# Patient Record
Sex: MALE | Race: WHITE | Employment: FULL TIME | URBAN - METROPOLITAN AREA
[De-identification: names, ages, dates, MRNs, and addresses within clinical notes are randomized per-mention and may not be internally consistent; named-entity substitution may affect disease eponyms.]

---

## 2017-12-09 PROBLEM — F33.1 MODERATE EPISODE OF RECURRENT MAJOR DEPRESSIVE DISORDER (HCC): Status: RESOLVED | Noted: 2017-12-09 | Resolved: 2017-12-09

## 2017-12-09 PROBLEM — F33.1 MODERATE EPISODE OF RECURRENT MAJOR DEPRESSIVE DISORDER (HCC): Status: ACTIVE | Noted: 2017-12-09

## 2018-06-26 ENCOUNTER — HOSPITAL ENCOUNTER (OUTPATIENT)
Dept: MRI IMAGING | Age: 48
Discharge: HOME | End: 2018-06-26
Attending: NURSE PRACTITIONER
Payer: COMMERCIAL

## 2018-06-26 DIAGNOSIS — M54.42 CHRONIC BILATERAL LOW BACK PAIN WITH BILATERAL SCIATICA: ICD-10-CM

## 2018-06-26 DIAGNOSIS — G89.29 CHRONIC BILATERAL LOW BACK PAIN WITH BILATERAL SCIATICA: ICD-10-CM

## 2018-06-26 DIAGNOSIS — M54.41 CHRONIC BILATERAL LOW BACK PAIN WITH BILATERAL SCIATICA: ICD-10-CM

## 2018-06-26 PROCEDURE — 72148 MRI LUMBAR SPINE W/O DYE: CPT

## 2018-06-29 NOTE — PROGRESS NOTES
Central disc protrusion more pronounced than previously L4-5 Can refer to neurosurgeon or ortho for evaluation.  Naty Portal, FNP

## 2018-08-09 PROBLEM — M54.50 LOW BACK PAIN: Status: ACTIVE | Noted: 2018-08-09

## 2018-11-28 ENCOUNTER — HOSPITAL ENCOUNTER (OUTPATIENT)
Dept: PHYSICAL THERAPY | Age: 48
Discharge: HOME | End: 2018-11-28
Payer: COMMERCIAL

## 2018-11-28 PROCEDURE — 97162 PT EVAL MOD COMPLEX 30 MIN: CPT

## 2018-11-28 PROCEDURE — 97110 THERAPEUTIC EXERCISES: CPT

## 2018-11-28 NOTE — THERAPY EVALUATION
Lianne Coleman  : 1970  Primary: [facility] [employer]*  Secondary:  [address]  [facility] [address] [facility] [address], [address] [facility]  [phone]   [phone]          OUTPATIENT PHYSICAL THERAPY:Initial Assessment 2018   ICD-10: Treatment Diagnosis: Low back pain (M54.5)  Precautions/Allergies:   Patient has no known allergies. MD Orders: Evaluate and Treat MEDICAL/REFERRING DIAGNOSIS:  Low back pain [M54.5]   DATE OF ONSET: Chronic  REFERRING PHYSICIAN: Harry Courtney NP  RETURN PHYSICIAN APPOINTMENT: TBD     INITIAL ASSESSMENT:  Mr. Pat Galindo presents with decreased mobility, decreased strength, and pain in lower back and core region secondary to degenerative changes. After discussing with patient, he agreed he would benefit from physical therapy to improve above deficits. Please sign this plan of treatment if you concur. Thank you for the opportunity to serve this patient. PROBLEM LIST (Impacting functional limitations):  1. Decreased Strength  2. Decreased ADL/Functional Activities  3. Increased Pain  4. Decreased Activity Tolerance INTERVENTIONS PLANNED:  1. Cold  2. Electrical Stimulation  3. Heat  4. Home Exercise Program (HEP)  5. Manual Therapy  6. Range of Motion (ROM)  7. Therapeutic Activites  8. Therapeutic Exercise/Strengthening   TREATMENT PLAN:  Effective Dates: 2018 TO 2019 (90 days). Frequency/Duration: 2 times a week for 90 Day(s)  GOALS: (Goals have been discussed and agreed upon with patient.)  Short-Term Functional Goals: Time Frame: 30 days  1. Patient will be independent with home exercise program without exacerbation of symptoms or cueing needed. 2. Patient will be independent with correct sleeping positions and awareness/avoidance of aggravating positions without cueing needed. Discharge Goals: Time Frame: 90 days  1.  Patient will be independent with all ADLs with minimal onset of back pain and no deficits with daily tasks. 2. Patient will report no fear avoidance with social or recreational activities due to back pain. 3. Patient will score less than or equal to 26/50 on Modified Oswestry Lower Back Pain Questionnaire with minimal effect of back pain on patient's ability to manage every day life activities. Rehabilitation Potential For Stated Goals[facility] I certify that the treatment plan above will be carried out by a therapist or under their direction. Thank you for this referral,  Koko Scott PT     Referring Physician Signature: Susan Hatfield NP              Date                    The information in this section was collected on 11/28/2018 (except where otherwise noted). HISTORY:   History of Present Injury/Illness (Reason for Referral):  Patient reports he had a laminectomy and microdiscectomy in 2003. He reports it helped initially, but then began to have pain in his back that keeps him from working and doing normal daily tasks. He reports that his back will \"give out\" and he will have trouble walking for about a week to two weeks. He reports that the pain will radiate into his hips and groin and down his legs. He reports that he has to be in the bed and lying on his side in order to relieve his pain. He reports when he is feeling good, walking seems to help the most.  He reports that he never knows when the pain will happen, but it seems to happen most when he reaches forward. He reports he thinks surgery is in his future, but he doesn't know where to go yet. He reports he knows he needs to work on his core strength and mobility.   Past Medical History/Comorbidities:   Mr. Zhang Fernández  has a past medical history of Androgen deficiency, Anxiety, Back pain, Chronic pain, Dermatophytosis of nail, Discitis of lumbar region, Fracture of metatarsal of left foot, closed, Hyperlipidemia, Hypertension, Lyme disease, Other malaise and fatigue, and Psychiatric disorder. Mr. Michelle Condon  has a past surgical history that includes hx orthopaedic. Social History/Living Environment:   Home Environment: Private residence  Living Alone: No  Support Systems: Family member(s), Friends \ neighbors  Prior Level of Function/Work/Activity:  Independent  Dominant Side:         RIGHT  Personal Factors:          Sex:  male        Age:  51 y.o. Ambulatory/Rehab Services H2 Model Falls Risk Assessment    Risk Factors:       (1)  Gender [Male] Ability to Rise from Chair:       (1)  Pushes up, successful in one attempt    Falls Prevention Plan:       No modifications necessary   Total: (5 or greater = High Risk): 2    ©2010 [employer]. All Rights Reserved. [facility] Patent #[id number]. Federal Law prohibits the replication, distribution or use without written permission from [employer]     Current Medications:       Current Outpatient Medications:     clonazePAM (KLONOPIN) 1 mg tablet, Take 1 Tab by mouth two (2) times daily as needed. Max Daily Amount: 2 mg., Disp: 60 Tab, Rfl: 2    buPROPion XL (WELLBUTRIN XL) 150 mg tablet, Take 1 Tab by mouth every morning., Disp: 30 Tab, Rfl: 2    HYDROcodone-acetaminophen (NORCO)  mg tablet, 1/2 to 1 tablet every 6 hours as needed, Disp: 60 Tab, Rfl: 0    gabapentin (NEURONTIN) 300 mg capsule, Take 600 mg by mouth three (3) times daily. , Disp: , Rfl:     methocarbamol (ROBAXIN-750) 750 mg tablet, Take  by mouth four (4) times daily. , Disp: , Rfl:     sertraline (ZOLOFT) 100 mg tablet, Take 1.5 Tabs by mouth daily. Indications: ANXIETY WITH DEPRESSION, Disp: 45 Tab, Rfl: 5    lisinopril (PRINIVIL, ZESTRIL) 40 mg tablet, Take 1 Tab by mouth daily. Indications: hypertension, Disp: 30 Tab, Rfl: 5    atorvastatin (LIPITOR) 40 mg tablet, Take 1 Tab by mouth daily. Indications: mixed hyperlipidemia, Disp: 30 Tab, Rfl: 5    fenofibric acid (TRILIPIX ER) 135 mg capsule, Take 1 Cap by mouth nightly. Indications: mixed hyperlipidemia, Disp: 30 Cap, Rfl: 5    testosterone cypionate (DEPOTESTOTERONE CYPIONATE) 200 mg/mL injection, 1 mL by IntraMUSCular route Once every 2 weeks. Max Daily Amount: 200 mg. Indications: ANDROGEN DEFICIENCY, Disp: 2 mL, Rfl: 5    naproxen (NAPROSYN) 500 mg tablet, Take 1 Tab by mouth two (2) times daily (with meals). Indications: Pain, Disp: 60 Tab, Rfl: 5    raNITIdine (ZANTAC) 75 mg tablet, Take 75 mg by mouth two (2) times daily as needed for Indigestion. , Disp: , Rfl:     OMEGA-3 FATTY ACIDS/FISH OIL (OMEGA 3 FISH OIL PO), Take  by mouth., Disp: , Rfl:     multivitamin (ONE A DAY) tablet, Take 1 Tab by mouth daily. , Disp: , Rfl:     nitroglycerin (NITROSTAT) 0.4 mg SL tablet, 1 Tab by SubLINGual route every five (5) minutes as needed for Chest Pain., Disp: 1 Bottle, Rfl: 3   Date Last Reviewed:  2018    Number of Personal Factors/Comorbidities that affect the Plan of Care: 1-2: MODERATE COMPLEXITY   EXAMINATION:   Observation/Orthostatic Postural Assessment:          Posture Assessment: Forward head, Trunk flexion, decreased lumbar lordosis and thoracic kyphosis in standing and sitting   Palpation:          Tightness and tenderness to palpation noted along bilateral lumbar paraspinals and sacroiliac region. No bruising or swelling noted. ROM:          Lumbar Assessment (AROM):  · Flexion: 50% of full AROM  · Extension: 50% of full AROM  · Right side bendin% of full AROM  · Left side bending[de-identified] 50% of full AROM  · Right rotation: 50% of full AROM  · Left rotation: 50% of full AROM  Strength:          Lumbar Assessment (Strength):  · Core strength: Grossly 3/5 with manual muscle testing  · Multifidus strength: Grossly 3/5 with manual muscle testing  Special Tests:          Negative sit/slump test.  Positive straight leg raise for reproduction of symptoms as well as hamstring tightness. Neurological Screen:        Dermatomes:   Within normal limits        Reflexes: 2+  Functional Mobility:         Gait/Mobility:    Base of Support: Center of gravity altered  Speed/Lucinda: Pace decreased (<100 feet/min)  Step Length: Left shortened, Right shortened  Swing Pattern: Left asymmetrical, Right asymmetrical  Stance: Left increased, Right increased  Gait Abnormalities: Antalgic  Ambulation - Level of Assistance: Independent  · Interventions: Verbal cues, Safety awareness training          Transfers:     Sit to Stand: Independent  Stand to Sit: Independent  Stand Pivot Transfers: Independent  Bed to Chair: Independent  Lateral Transfers: Independent         Bed Mobility:     Rolling: Independent  Supine to Sit: Independent  Sit to Supine: Independent  Scooting: Independent      Body Structures Involved:  1. Nerves  2. Joints  3. Muscles Body Functions Affected:  1. Neuromusculoskeletal  2. Movement Related Activities and Participation Affected:  1. Mobility  2. Self Care   Number of elements (examined above) that affect the Plan of Care: 4+: HIGH COMPLEXITY   CLINICAL PRESENTATION:   Presentation: Evolving clinical presentation with changing clinical characteristics: MODERATE COMPLEXITY   CLINICAL DECISION MAKING:   Outcome Measure: Tool Used: Modified Oswestry Low Back Pain Questionnaire  Score:  Initial: 36/50  Most Recent: X/50 (Date: -- )   Interpretation of Score: Each section is scored on a 0-5 scale, 5 representing the greatest disability. The scores of each section are added together for a total score of 50. Score 0 1-10 11-20 21-30 31-40 41-49 50   Modifier CH CI CJ CK CL CM CN     Medical Necessity:   · Patient is expected to demonstrate progress in strength and range of motion to improve safety during daily activities. · Patient demonstrates good rehab potential due to higher previous functional level. · Skilled intervention continues to be required due to decreased mobility.   Reason for Services/Other Comments:  · Patient continues to demonstrate capacity to improve overall functional mobility which will increase independence and increase safety. Use of outcome tool(s) and clinical judgement create a POC that gives a: Questionable prediction of patient's progress: MODERATE COMPLEXITY            TREATMENT:   (In addition to Assessment/Re-Assessment sessions the following treatments were rendered)  Pre-treatment Symptoms/Complaints:  11/28/2018: Patient reports he is about his normal today with everything hurting across his lower back   Pain: Initial:   Pain Intensity 1: 7  Pain Location 1: Back  Pain Orientation 1: Lower  Pain Intervention(s) 1: Rest, Medication (see MAR)  Post Session:  6/10     THERAPEUTIC EXERCISE: (15 minutes):  Exercises per grid below to improve mobility and strength. Required minimal verbal and manual cues to promote proper body alignment, promote proper body posture and promote proper body mechanics. Progressed resistance, range, repetitions and complexity of movement as indicated. Date:  11/28/2018   Activity/Exercise Parameters   Active hamstring stretch with ankle pumps and single knee to chest stretch HEP   Isometric hip flexion for core strengthening HEP      Med[employer] Portal  Treatment/Session Assessment:    · Response to Treatment:  Patient tolerated assessment with complaints of increased back pain. Patient verbalized and demonstrated understanding of HEP. · Compliance with Program/Exercises: Will assess as treatment progresses. · Recommendations/Intent for next treatment session: \"Next visit will focus on advancements to more challenging activities\".   Total Treatment Duration:  PT Patient Time In/Time Out  Time In: 1430  Time Out: 300 [employer] [place], PT    Future Appointments   Date Time Provider Laya Farrar   12/3/2018  9:45 AM [facility]   12/6/2018  9:45 AM LOIS Rivers [facility]   12/10/2018  9:45 AM LOIS Rivers [facility]   12/11/2018  9:00 AM BALTAZAR NURSE Helen LEDESMA [facility]   12/13/2018  9:45 AM Kaitlin Ada, PT [facility]   12/17/2018 10:30 AM Alla Setter, PTA [facility]   12/20/2018  9:45 AM Nury Hamilton, PTA [facility]   12/26/2018  8:00 AM ANA Bravo [facility]   12/27/2018  9:45 AM Kaitlin Ada, PT [facility]   12/31/2018  9:45 AM [place] Setter, PTA [facility]   1/3/2019  9:45 AM Nury Hamilton, PTA [facility]

## 2018-12-06 ENCOUNTER — HOSPITAL ENCOUNTER (OUTPATIENT)
Dept: PHYSICAL THERAPY | Age: 48
Discharge: HOME | End: 2018-12-06

## 2018-12-10 ENCOUNTER — APPOINTMENT (OUTPATIENT)
Dept: PHYSICAL THERAPY | Age: 48
End: 2018-12-10

## 2018-12-31 ENCOUNTER — APPOINTMENT (OUTPATIENT)
Dept: PHYSICAL THERAPY | Age: 48
End: 2018-12-31

## 2019-01-03 ENCOUNTER — APPOINTMENT (OUTPATIENT)
Dept: PHYSICAL THERAPY | Age: 49
End: 2019-01-03

## 2021-02-11 PROBLEM — I34.1 MITRAL VALVE PROLAPSE: Status: ACTIVE | Noted: 2021-02-11

## 2021-08-03 ENCOUNTER — HOSPITAL ENCOUNTER (OUTPATIENT)
Dept: MRI IMAGING | Age: 51
Discharge: HOME | End: 2021-08-03
Attending: UROLOGY
Payer: COMMERCIAL

## 2021-08-03 DIAGNOSIS — R97.20 ELEVATED PSA: ICD-10-CM

## 2021-08-03 PROCEDURE — 72197 MRI PELVIS W/O & W/DYE: CPT

## 2021-08-03 PROCEDURE — 74011636320 HC RX REV CODE- 636/320: Performed by: UROLOGY

## 2021-08-03 PROCEDURE — A9576 INJ PROHANCE MULTIPACK: HCPCS | Performed by: UROLOGY

## 2021-08-03 PROCEDURE — 74011000258 HC RX REV CODE- 258: Performed by: UROLOGY

## 2021-08-03 RX ORDER — SODIUM CHLORIDE 0.9 % (FLUSH) 0.9 %
10 SYRINGE (ML) INJECTION
Status: COMPLETED | OUTPATIENT
Start: 2021-08-03 | End: 2021-08-03

## 2021-08-03 RX ADMIN — GADOTERIDOL 19 ML: 279.3 INJECTION, SOLUTION INTRAVENOUS at 21:23

## 2021-08-03 RX ADMIN — SODIUM CHLORIDE 100 ML: 900 INJECTION, SOLUTION INTRAVENOUS at 21:23

## 2021-08-03 RX ADMIN — Medication 10 ML: at 21:23

## 2021-08-18 ENCOUNTER — ANESTHESIA EVENT (OUTPATIENT)
Dept: SURGERY | Age: 51
End: 2021-08-18
Payer: COMMERCIAL

## 2021-08-19 ENCOUNTER — ANESTHESIA (OUTPATIENT)
Dept: SURGERY | Age: 51
End: 2021-08-19
Payer: COMMERCIAL

## 2021-08-19 ENCOUNTER — HOSPITAL ENCOUNTER (OUTPATIENT)
Age: 51
Setting detail: OUTPATIENT SURGERY
Discharge: HOME | End: 2021-08-19
Attending: UROLOGY | Admitting: UROLOGY
Payer: COMMERCIAL

## 2021-08-19 VITALS
TEMPERATURE: 98 F | HEIGHT: 71 IN | WEIGHT: 201.2 LBS | RESPIRATION RATE: 12 BRPM | OXYGEN SATURATION: 97 % | SYSTOLIC BLOOD PRESSURE: 128 MMHG | DIASTOLIC BLOOD PRESSURE: 77 MMHG | HEART RATE: 67 BPM | BODY MASS INDEX: 28.17 KG/M2

## 2021-08-19 DIAGNOSIS — R97.20 ELEVATED PSA: ICD-10-CM

## 2021-08-19 PROCEDURE — 74011000250 HC RX REV CODE- 250: Performed by: NURSE ANESTHETIST, CERTIFIED REGISTERED

## 2021-08-19 PROCEDURE — 74011250637 HC RX REV CODE- 250/637: Performed by: ANESTHESIOLOGY

## 2021-08-19 PROCEDURE — 76060000031 HC ANESTHESIA FIRST 0.5 HR: Performed by: UROLOGY

## 2021-08-19 PROCEDURE — 76210000020 HC REC RM PH II FIRST 0.5 HR: Performed by: UROLOGY

## 2021-08-19 PROCEDURE — 88305 TISSUE EXAM BY PATHOLOGIST: CPT

## 2021-08-19 PROCEDURE — 76942 ECHO GUIDE FOR BIOPSY: CPT | Performed by: UROLOGY

## 2021-08-19 PROCEDURE — 55700 PR BIOPSY OF PROSTATE,NEEDLE/PUNCH: CPT | Performed by: UROLOGY

## 2021-08-19 PROCEDURE — 2709999900 HC NON-CHARGEABLE SUPPLY: Performed by: UROLOGY

## 2021-08-19 PROCEDURE — 76210000063 HC OR PH I REC FIRST 0.5 HR: Performed by: UROLOGY

## 2021-08-19 PROCEDURE — 74011250636 HC RX REV CODE- 250/636: Performed by: ANESTHESIOLOGY

## 2021-08-19 PROCEDURE — 74011250636 HC RX REV CODE- 250/636: Performed by: UROLOGY

## 2021-08-19 PROCEDURE — 74011250636 HC RX REV CODE- 250/636: Performed by: NURSE ANESTHETIST, CERTIFIED REGISTERED

## 2021-08-19 PROCEDURE — 76010000154 HC OR TIME FIRST 0.5 HR: Performed by: UROLOGY

## 2021-08-19 PROCEDURE — 74011000258 HC RX REV CODE- 258: Performed by: UROLOGY

## 2021-08-19 PROCEDURE — 77030003481 HC NDL BIOP GUN BARD -B: Performed by: UROLOGY

## 2021-08-19 RX ORDER — LIDOCAINE HYDROCHLORIDE 20 MG/ML
INJECTION, SOLUTION EPIDURAL; INFILTRATION; INTRACAUDAL; PERINEURAL AS NEEDED
Status: DISCONTINUED | OUTPATIENT
Start: 2021-08-19 | End: 2021-08-19 | Stop reason: HOSPADM

## 2021-08-19 RX ORDER — FAMOTIDINE 20 MG/1
20 TABLET, FILM COATED ORAL ONCE
Status: COMPLETED | OUTPATIENT
Start: 2021-08-19 | End: 2021-08-19

## 2021-08-19 RX ORDER — PROPOFOL 10 MG/ML
INJECTION, EMULSION INTRAVENOUS
Status: DISCONTINUED | OUTPATIENT
Start: 2021-08-19 | End: 2021-08-19 | Stop reason: HOSPADM

## 2021-08-19 RX ORDER — SODIUM CHLORIDE 0.9 % (FLUSH) 0.9 %
5-40 SYRINGE (ML) INJECTION EVERY 8 HOURS
Status: DISCONTINUED | OUTPATIENT
Start: 2021-08-19 | End: 2021-08-19 | Stop reason: HOSPADM

## 2021-08-19 RX ORDER — SODIUM CHLORIDE 0.9 % (FLUSH) 0.9 %
5-40 SYRINGE (ML) INJECTION AS NEEDED
Status: DISCONTINUED | OUTPATIENT
Start: 2021-08-19 | End: 2021-08-19 | Stop reason: HOSPADM

## 2021-08-19 RX ORDER — SODIUM CHLORIDE, SODIUM LACTATE, POTASSIUM CHLORIDE, CALCIUM CHLORIDE 600; 310; 30; 20 MG/100ML; MG/100ML; MG/100ML; MG/100ML
100 INJECTION, SOLUTION INTRAVENOUS CONTINUOUS
Status: DISCONTINUED | OUTPATIENT
Start: 2021-08-19 | End: 2021-08-19 | Stop reason: HOSPADM

## 2021-08-19 RX ORDER — HYDROMORPHONE HYDROCHLORIDE 2 MG/ML
0.5 INJECTION, SOLUTION INTRAMUSCULAR; INTRAVENOUS; SUBCUTANEOUS
Status: DISCONTINUED | OUTPATIENT
Start: 2021-08-19 | End: 2021-08-19 | Stop reason: HOSPADM

## 2021-08-19 RX ORDER — MIDAZOLAM HYDROCHLORIDE 1 MG/ML
INJECTION, SOLUTION INTRAMUSCULAR; INTRAVENOUS AS NEEDED
Status: DISCONTINUED | OUTPATIENT
Start: 2021-08-19 | End: 2021-08-19 | Stop reason: HOSPADM

## 2021-08-19 RX ORDER — PROPOFOL 10 MG/ML
INJECTION, EMULSION INTRAVENOUS AS NEEDED
Status: DISCONTINUED | OUTPATIENT
Start: 2021-08-19 | End: 2021-08-19 | Stop reason: HOSPADM

## 2021-08-19 RX ORDER — DIPHENHYDRAMINE HYDROCHLORIDE 50 MG/ML
12.5 INJECTION, SOLUTION INTRAMUSCULAR; INTRAVENOUS
Status: DISCONTINUED | OUTPATIENT
Start: 2021-08-19 | End: 2021-08-19 | Stop reason: HOSPADM

## 2021-08-19 RX ORDER — MIDAZOLAM HYDROCHLORIDE 1 MG/ML
2 INJECTION, SOLUTION INTRAMUSCULAR; INTRAVENOUS ONCE
Status: DISCONTINUED | OUTPATIENT
Start: 2021-08-19 | End: 2021-08-19 | Stop reason: HOSPADM

## 2021-08-19 RX ORDER — OXYCODONE AND ACETAMINOPHEN 5; 325 MG/1; MG/1
1 TABLET ORAL AS NEEDED
Status: DISCONTINUED | OUTPATIENT
Start: 2021-08-19 | End: 2021-08-19 | Stop reason: HOSPADM

## 2021-08-19 RX ORDER — SODIUM CHLORIDE, SODIUM LACTATE, POTASSIUM CHLORIDE, CALCIUM CHLORIDE 600; 310; 30; 20 MG/100ML; MG/100ML; MG/100ML; MG/100ML
75 INJECTION, SOLUTION INTRAVENOUS CONTINUOUS
Status: DISCONTINUED | OUTPATIENT
Start: 2021-08-19 | End: 2021-08-19 | Stop reason: HOSPADM

## 2021-08-19 RX ORDER — FENTANYL CITRATE 50 UG/ML
25 INJECTION, SOLUTION INTRAMUSCULAR; INTRAVENOUS ONCE
Status: DISCONTINUED | OUTPATIENT
Start: 2021-08-19 | End: 2021-08-19 | Stop reason: HOSPADM

## 2021-08-19 RX ORDER — SODIUM CHLORIDE 9 MG/ML
50 INJECTION, SOLUTION INTRAVENOUS CONTINUOUS
Status: DISCONTINUED | OUTPATIENT
Start: 2021-08-19 | End: 2021-08-19 | Stop reason: HOSPADM

## 2021-08-19 RX ORDER — ACETAMINOPHEN 500 MG
1000 TABLET ORAL
Status: COMPLETED | OUTPATIENT
Start: 2021-08-19 | End: 2021-08-19

## 2021-08-19 RX ORDER — OXYCODONE HYDROCHLORIDE 5 MG/1
5 TABLET ORAL
Status: COMPLETED | OUTPATIENT
Start: 2021-08-19 | End: 2021-08-19

## 2021-08-19 RX ORDER — LIDOCAINE HYDROCHLORIDE 10 MG/ML
0.1 INJECTION INFILTRATION; PERINEURAL AS NEEDED
Status: DISCONTINUED | OUTPATIENT
Start: 2021-08-19 | End: 2021-08-19 | Stop reason: HOSPADM

## 2021-08-19 RX ORDER — MIDAZOLAM HYDROCHLORIDE 1 MG/ML
2 INJECTION, SOLUTION INTRAMUSCULAR; INTRAVENOUS
Status: DISCONTINUED | OUTPATIENT
Start: 2021-08-19 | End: 2021-08-19 | Stop reason: HOSPADM

## 2021-08-19 RX ADMIN — LIDOCAINE HYDROCHLORIDE 60 MG: 20 INJECTION, SOLUTION EPIDURAL; INFILTRATION; INTRACAUDAL; PERINEURAL at 17:51

## 2021-08-19 RX ADMIN — ACETAMINOPHEN 1000 MG: 500 TABLET ORAL at 16:53

## 2021-08-19 RX ADMIN — SODIUM CHLORIDE, SODIUM LACTATE, POTASSIUM CHLORIDE, AND CALCIUM CHLORIDE 75 ML/HR: 600; 310; 30; 20 INJECTION, SOLUTION INTRAVENOUS at 16:04

## 2021-08-19 RX ADMIN — CEFTRIAXONE 1 G: 1 INJECTION, POWDER, FOR SOLUTION INTRAMUSCULAR; INTRAVENOUS at 16:04

## 2021-08-19 RX ADMIN — PROPOFOL 50 MG: 10 INJECTION, EMULSION INTRAVENOUS at 17:51

## 2021-08-19 RX ADMIN — PROPOFOL 180 MCG/KG/MIN: 10 INJECTION, EMULSION INTRAVENOUS at 17:51

## 2021-08-19 RX ADMIN — MIDAZOLAM 2 MG: 1 INJECTION INTRAMUSCULAR; INTRAVENOUS at 17:46

## 2021-08-19 RX ADMIN — OXYCODONE 5 MG: 5 TABLET ORAL at 18:22

## 2021-08-19 RX ADMIN — FAMOTIDINE 20 MG: 20 TABLET, FILM COATED ORAL at 16:03

## 2021-08-19 NOTE — OP NOTES
Operative Note        Patient: Lurdes Muller, [id number]    Date of Surgery: 08/19/21    Preoperative Diagnosis: Elevated PSA    Postoperative Diagnosis:  same    Surgeon(s) and Role:     * Giorgio Pichardo MD - Primary     Anesthesia:  MAC     Procedure: Procedure(s):  Mario Albertofarzana Dave MRI fused TRUS prostate biopsy     Indications:     Discussed the risk of surgery including infection, hematoma, bleeding, recurrence or persistence of symptoms or stone, and the risks of general anesthetic. The patient understands the risks, any and all questions were answered to the patient's satisfaction and they freely signed the consent for operation. URONAV MRI FUSED TRANSRECTAL ULTRASOUND GUIDED BIOPSY OF THE PROSTATE    All risks, benefits and alternatives were again reviewed and he is willing to proceed at this time. The patient was placed in the left lateral decubitus position. Rocephin was given as a prophylactic antibiotic. I then inserted the transrectal ultrasound probe into the rectum. The prostate was visualized. The prostate appeared homogenous in appearance. Ultrasonographic sweep of the prostate was performed to obtain images to link to MRI via URONAV. There were 1 region of interest based upon MRI imaging. 3 biopsies of region of interest were then obtained using the ultrasound images for guidance that had been linked to the previous MRI using Uronav. I then performed 12 needle core biopsies using a standard sextant biopsy format with traditional ultrasound images for guidance. The ultrasound probe was removed. The patient tolerated the procedure well. PROSTATE VOLUME:  31 cc    BETTY: No induration or nodule     Estimated Blood Loss:  minimal    Specimens: prostate biopsies             Drains: none                 Implants: * No implants in log *    Bridget Givens M.D.     [facility]  [address] [place] [address]

## 2021-08-19 NOTE — DISCHARGE INSTRUCTIONS
My office will schedule your follow-up appointment. Please call our office ([phone]) or return to ED if you experience fever > 101.5, severe pain, persistent nausea/vomiting, excessive bleeding, inability to urinate, or other concerns. Please take your cipro tablet tonight. Prostate Biopsy and Ultrasound:   A prostate biopsy is a type of test. Your doctor takes small tissue samples from your prostate gland. Then another doctor looks at the tissue under a microscope to see if there are cancer cells. This test is done by a doctor who specializes in men's genital and urinary problems (urologist). It can be done in your doctor's office, a day surgery clinic, or a hospital operating room. To get the tissue samples from the prostate, the doctor inserts a thin needle through the rectum, the urethra, or the area between the anus and scrotum (perineum). The most common method is through the rectum. Your doctor may use ultrasound to help guide the needle. What else should you know about this test?  · A prostate biopsy has a slight risk of causing problems such as infection or bleeding. · If the biopsy went through your rectum, you may have a small amount of bleeding from your rectum for 2 to 3 days after the biopsy. · You may have a little pain in your pelvic area. You may also have a little blood in your urine for 1 to 5 days. · You may have some blood in your semen for a week or longer. · Do not do heavy work or exercise for 4 hours after the test.  · Your doctor will tell you how long it may take to get your results back. Follow-up care is a key part of your treatment and safety. Be sure to make and go to all appointments, and call your doctor if you are having problems. It's also a good idea to keep a list of the medicines you take. Ask your doctor when you can expect to have your test results.     After general anesthesia or intravenous sedation, for 24 hours or while taking prescription Narcotics:  · Limit your activities  · A responsible adult needs to be with you for the next 24 hours  · Do not drive and operate hazardous machinery  · Do not make important personal or business decisions  · Do not drink alcoholic beverages  · If you have not urinated within 8 hours after discharge, and you are experiencing discomfort from urinary retention, please go to the nearest ED. · If you have sleep apnea and have a CPAP machine, please use it for all naps and sleeping. · Please use caution when taking narcotics and any of your home medications that may cause drowsiness. *  Please give a list of your current medications to your Primary Care Provider. *  Please update this list whenever your medications are discontinued, doses are      changed, or new medications (including over-the-counter products) are added. *  Please carry medication information at all times in case of emergency situations. These are general instructions for a healthy lifestyle:  No smoking/ No tobacco products/ Avoid exposure to second hand smoke  Surgeon General's Warning:  Quitting smoking now greatly reduces serious risk to your health. Obesity, smoking, and sedentary lifestyle greatly increases your risk for illness  A healthy diet, regular physical exercise & weight monitoring are important for maintaining a healthy lifestyle    You may be retaining fluid if you have a history of heart failure or if you experience any of the following symptoms:  Weight gain of 3 pounds or more overnight or 5 pounds in a week, increased swelling in our hands or feet or shortness of breath while lying flat in bed. Please call your doctor as soon as you notice any of these symptoms; do not wait until your next office visit. Eileen Myers

## 2021-08-19 NOTE — ANESTHESIA POSTPROCEDURE EVALUATION
Procedure(s):  MRI FUSION PROSTATE BIOPSY. total IV anesthesia    Anesthesia Post Evaluation      Multimodal analgesia: multimodal analgesia not used between 6 hours prior to anesthesia start to PACU discharge  Patient location during evaluation: bedside  Patient participation: complete - patient participated  Level of consciousness: awake  Pain score: 1  Pain management: adequate  Airway patency: patent  Anesthetic complications: no  Cardiovascular status: acceptable  Respiratory status: acceptable  Hydration status: acceptable  Comments: Pt doing well. Post anesthesia nausea and vomiting:  none  Final Post Anesthesia Temperature Assessment:  Normothermia (36.0-37.5 degrees C)      INITIAL Post-op Vital signs:   Vitals Value Taken Time   /77 08/19/21 1839   Temp 36.9 °C (98.4 °F) 08/19/21 1811   Pulse 69 08/19/21 1840   Resp 12 08/19/21 1811   SpO2 96 % 08/19/21 1840   Vitals shown include unvalidated device data.

## 2021-08-19 NOTE — ANESTHESIA PREPROCEDURE EVALUATION
Relevant Problems   No relevant active problems       Anesthetic History   No history of anesthetic complications            Review of Systems / Medical History  Patient summary reviewed and pertinent labs reviewed    Pulmonary        Sleep apnea: No treatment           Neuro/Psych         Psychiatric history (Anxiety)     Cardiovascular    Hypertension: well controlled              Exercise tolerance: >4 METS  Comments: Normal stress ECHO  Denies CP, SOB or changes in functional status   GI/Hepatic/Renal  Within defined limits              Endo/Other        Arthritis     Other Findings              Physical Exam    Airway  Mallampati: II  TM Distance: 4 - 6 cm  Neck ROM: normal range of motion   Mouth opening: Normal     Cardiovascular    Rhythm: regular  Rate: normal         Dental    Dentition: Poor dentition and Caps/crowns     Pulmonary  Breath sounds clear to auscultation               Abdominal  GI exam deferred       Other Findings            Anesthetic Plan    ASA: 2  Anesthesia type: total IV anesthesia          Induction: Intravenous  Anesthetic plan and risks discussed with: Patient

## 2021-08-20 NOTE — PROGRESS NOTES
Late entry progress note for visit on 8.19.21:    's pre-procedure visit and prayer with patient as requested.     Corrine Leslie MDiv, BS  Board Certified

## 2021-11-02 PROBLEM — R97.20 ELEVATED PSA: Status: ACTIVE | Noted: 2021-11-02

## 2021-11-17 ENCOUNTER — HOSPITAL ENCOUNTER (OUTPATIENT)
Dept: MRI IMAGING | Age: 51
Discharge: HOME | End: 2021-11-17
Attending: INTERNAL MEDICINE
Payer: COMMERCIAL

## 2021-11-17 DIAGNOSIS — E23.0 HYPOGONADOTROPIC HYPOGONADISM (HCC): ICD-10-CM

## 2021-11-17 PROCEDURE — A9576 INJ PROHANCE MULTIPACK: HCPCS | Performed by: INTERNAL MEDICINE

## 2021-11-17 PROCEDURE — 74011000258 HC RX REV CODE- 258: Performed by: INTERNAL MEDICINE

## 2021-11-17 PROCEDURE — 70553 MRI BRAIN STEM W/O & W/DYE: CPT

## 2021-11-17 PROCEDURE — 74011250636 HC RX REV CODE- 250/636: Performed by: INTERNAL MEDICINE

## 2021-11-17 RX ORDER — SODIUM CHLORIDE 0.9 % (FLUSH) 0.9 %
10 SYRINGE (ML) INJECTION
Status: COMPLETED | OUTPATIENT
Start: 2021-11-17 | End: 2021-11-17

## 2021-11-17 RX ADMIN — Medication 10 ML: at 19:00

## 2021-11-17 RX ADMIN — SODIUM CHLORIDE 100 ML: 900 INJECTION, SOLUTION INTRAVENOUS at 19:00

## 2021-11-17 RX ADMIN — GADOTERIDOL 18 ML: 279.3 INJECTION, SOLUTION INTRAVENOUS at 19:00

## 2022-03-19 PROBLEM — R97.20 ELEVATED PSA: Status: ACTIVE | Noted: 2021-11-02

## 2022-03-19 PROBLEM — M54.50 LUMBAR PAIN: Status: ACTIVE | Noted: 2018-08-09

## 2022-03-20 PROBLEM — I34.1 MITRAL VALVE PROLAPSE: Status: ACTIVE | Noted: 2021-02-11

## 2022-05-31 ENCOUNTER — TELEPHONE (OUTPATIENT)
Dept: ENDOCRINOLOGY | Age: 52
End: 2022-05-31

## 2022-05-31 DIAGNOSIS — E29.1 MALE HYPOGONADISM: Primary | ICD-10-CM

## 2022-05-31 NOTE — TELEPHONE ENCOUNTER
If other doctors are prescribing him testosterone (which is a controlled substance), I cannot prescribe him any more testosterone. It is perfectly fine for his primary care provider to manage this condition for him.

## 2022-05-31 NOTE — TELEPHONE ENCOUNTER
Pt LVM stating his testosterone is costing him over $600 and he doesn't know why. I called pharmacy who stated the gel we sent is out of refills but that a Dr. Aldair Vale called in testosterone patches today for him. I called patient to get clarification on whether get is going to be using the gel or patches and that only one doctor should be filling his testosterone. Patient did not answer, left him a voicemail.

## 2022-06-02 ENCOUNTER — TELEPHONE (OUTPATIENT)
Dept: ENDOCRINOLOGY | Age: 52
End: 2022-06-02

## 2022-06-02 RX ORDER — TESTOSTERONE 16.2 MG/G
2 GEL TRANSDERMAL DAILY
Qty: 1 EACH | Refills: 5 | Status: SHIPPED | OUTPATIENT
Start: 2022-06-02 | End: 2022-11-29

## 2022-06-02 NOTE — TELEPHONE ENCOUNTER
Pt LVM stating he wants to continue using the Testosterone gel and needs a new rx for it. .  Pt states his PCP check his Testosterone levels and when he saw they were off he automatically sent in a rx for patches. Pt says that the pharmacy was not able to fill the Testosterone patches.

## 2022-06-02 NOTE — TELEPHONE ENCOUNTER
Prescription sent to the [facility] [place] [place] on [address]. If it needs to be sent to a different pharmacy, please resubmit it.

## 2023-02-13 ENCOUNTER — TELEPHONE (OUTPATIENT)
Dept: ORTHOPEDIC SURGERY | Age: 53
End: 2023-02-13

## 2023-03-15 ENCOUNTER — TELEPHONE (OUTPATIENT)
Dept: ONCOLOGY | Age: 53
End: 2023-03-15

## 2023-03-28 DIAGNOSIS — R97.20 ELEVATED PROSTATE SPECIFIC ANTIGEN (PSA): Primary | ICD-10-CM

## 2023-03-28 NOTE — PROGRESS NOTES
masses, no CVA tenderness  : DEFERRED       LABORATORY RESULTS:    Lab Results   Component Value Date/Time    PSA 5.5 04/03/2023 09:24 AM    PSA 7.6 07/16/2021 10:37 AM          ASSESSMENT:    Mr. Viviana Ann is a 46 y.o. male with a diagnosis of elevated PSA. I am very encouraged that his PSA is down to 5.5. We discussed various reasons for an elevated PSA. I suspect his recent lower urinary tract infection may be the cause for his PSA elevation. I have recommended continued PSA surveillance. We will plan to recheck it in 3 to 4 months. In regards to his severe frequency and lower urinary tract symptoms, we will plan to refill his Ditropan XL and he will start that. I also want to check a UA and urine culture today to make sure there is no evidence of bacteria. PLAN:   -psa and t.test  UA and U cx today  -rtc in 4 months psa  -refill ditropan xl    ________________________________________      I have seen and examined this patient. I have reviewed and edited the note started by the MA and agree with the outlined plan. Part of this note was written by using a voice dictation software. The note has been proof read but may still contain some grammatical/other typographical errors.       [facility]

## 2023-04-03 ENCOUNTER — OFFICE VISIT (OUTPATIENT)
Dept: ONCOLOGY | Age: 53
End: 2023-04-03
Payer: COMMERCIAL

## 2023-04-03 ENCOUNTER — HOSPITAL ENCOUNTER (OUTPATIENT)
Dept: LAB | Age: 53
Discharge: HOME | End: 2023-04-06
Payer: COMMERCIAL

## 2023-04-03 VITALS
TEMPERATURE: 97.5 F | RESPIRATION RATE: 23 BRPM | OXYGEN SATURATION: 96 % | HEART RATE: 108 BPM | HEIGHT: 71 IN | BODY MASS INDEX: 30.18 KG/M2 | SYSTOLIC BLOOD PRESSURE: 156 MMHG | WEIGHT: 215.6 LBS | DIASTOLIC BLOOD PRESSURE: 95 MMHG

## 2023-04-03 DIAGNOSIS — R97.20 ELEVATED PROSTATE SPECIFIC ANTIGEN (PSA): ICD-10-CM

## 2023-04-03 DIAGNOSIS — R30.0 DYSURIA: ICD-10-CM

## 2023-04-03 DIAGNOSIS — R97.20 ELEVATED PROSTATE SPECIFIC ANTIGEN (PSA): Primary | ICD-10-CM

## 2023-04-03 LAB
APPEARANCE UR: CLEAR
BACTERIA URNS QL MICRO: NORMAL /HPF
BILIRUB UR QL: NEGATIVE
CASTS URNS QL MICRO: 0 /LPF
COLOR UR: YELLOW
CRYSTALS URNS QL MICRO: 0 /LPF
EPI CELLS #/AREA URNS HPF: NORMAL /HPF
GLUCOSE UR STRIP.AUTO-MCNC: NEGATIVE MG/DL
HGB UR QL STRIP: NEGATIVE
KETONES UR QL STRIP.AUTO: NEGATIVE MG/DL
LEUKOCYTE ESTERASE UR QL STRIP.AUTO: NORMAL
MUCOUS THREADS URNS QL MICRO: NORMAL /LPF
NITRITE UR QL STRIP.AUTO: NEGATIVE
PH UR STRIP: 6 (ref 5–9)
PROT UR STRIP-MCNC: NEGATIVE MG/DL
PSA SERPL-MCNC: 5.5 NG/ML
RBC #/AREA URNS HPF: 0 /HPF
SP GR UR REFRACTOMETRY: <=1.005 (ref 1–1.02)
UROBILINOGEN UR QL STRIP.AUTO: 0.2 EU/DL
WBC URNS QL MICRO: NORMAL /HPF

## 2023-04-03 PROCEDURE — 99213 OFFICE O/P EST LOW 20 MIN: CPT | Performed by: UROLOGY

## 2023-04-03 PROCEDURE — 36415 COLL VENOUS BLD VENIPUNCTURE: CPT

## 2023-04-03 PROCEDURE — 3077F SYST BP >= 140 MM HG: CPT | Performed by: UROLOGY

## 2023-04-03 PROCEDURE — 87086 URINE CULTURE/COLONY COUNT: CPT

## 2023-04-03 PROCEDURE — 84153 ASSAY OF PSA TOTAL: CPT

## 2023-04-03 PROCEDURE — 3080F DIAST BP >= 90 MM HG: CPT | Performed by: UROLOGY

## 2023-04-03 PROCEDURE — 84403 ASSAY OF TOTAL TESTOSTERONE: CPT

## 2023-04-03 PROCEDURE — 81001 URINALYSIS AUTO W/SCOPE: CPT

## 2023-04-03 RX ORDER — OXYBUTYNIN CHLORIDE 10 MG/1
10 TABLET, EXTENDED RELEASE ORAL DAILY
Qty: 30 TABLET | Refills: 3 | Status: SHIPPED | OUTPATIENT
Start: 2023-04-03

## 2023-04-03 ASSESSMENT — ENCOUNTER SYMPTOMS
RESPIRATORY NEGATIVE: 1
GASTROINTESTINAL NEGATIVE: 1

## 2023-04-03 ASSESSMENT — PATIENT HEALTH QUESTIONNAIRE - PHQ9
SUM OF ALL RESPONSES TO PHQ QUESTIONS 1-9: 0
SUM OF ALL RESPONSES TO PHQ9 QUESTIONS 1 & 2: 0
SUM OF ALL RESPONSES TO PHQ QUESTIONS 1-9: 0
2. FEELING DOWN, DEPRESSED OR HOPELESS: 0
1. LITTLE INTEREST OR PLEASURE IN DOING THINGS: 0

## 2023-04-03 NOTE — PATIENT INSTRUCTIONS
Patient Instructions from Today's Visit    Reason for Visit:  Follow up     Diagnosis Information:  [email or web address]      Plan: We would like see you back in three months with a recheck of your psa     Follow Up: In 3 months with labs prior     Recent Lab Results:  Not resulted as of this note. Treatment Summary has been discussed and given to patient:   N/a      -------------------------------------------------------------------------------------------------------------------    Patient does express an interest in My Chart. My Chart log in information explained on the after visit summary printout at the . [address] desk.     JAMEEL Crenshaw

## 2023-04-03 NOTE — LETTER
April 3, 2023      Patricia Jaeger MD  [facility]      Patient: Charlene Limon   MR Number: [id number]   YOB: 1970   Date of Visit: 4/3/2023       Dear Patricia Jaeger:    Thank you for referring Kaushal Garcia to me for evaluation/treatment. Below are the relevant portions of my assessment and plan of care. If you have questions, please do not hesitate to call me. I look forward to following Harry Hook along with you.     Sincerely,        Deven Guerra MD

## 2023-04-05 LAB
BACTERIA SPEC CULT: NORMAL
SERVICE CMNT-IMP: NORMAL
TESTOST FREE SERPL-MCNC: NORMAL PG/ML
TESTOST SERPL-MCNC: NORMAL NG/DL

## 2023-06-29 DIAGNOSIS — R97.20 ELEVATED PROSTATE SPECIFIC ANTIGEN (PSA): Primary | ICD-10-CM

## 2023-07-10 ENCOUNTER — TELEPHONE (OUTPATIENT)
Dept: ONCOLOGY | Age: 53
End: 2023-07-10

## 2023-07-11 LAB — PROSTATE SPECIFIC ANTIGEN: 5.2 NG/ML

## 2023-07-25 NOTE — PROGRESS NOTES
[facility]  [address] [place] [address]  [phone]        Mr. Flory Sylvester is a 46 y.o. male with a diagnosis of elevated PSA    INTERVAL HISTORY: Patient is here today for follow-up of his elevated PSA. It had gotten as high as 9.9 but then came back down to 5.5 on the last saw him. He has been off of his testosterone supplementation and doing well. Of note he underwent an MRI fusion biopsy on 8/19/2021 which was negative for malignancy. His MRI at that time showed a PI-RADS 4 lesion that was approximately 5 mm in size. He also complains of continued urgency and some burning with urination. He does take the digital pan XL and thinks that helps and that he needs a refill. His last urine culture from 4/3/2023 was negative for infection. He denies any hematuria or flank pain. Importantly, his PSA today is down to 3.6. From previous note:  Patient is here today for follow-up of his history of an elevated PSA. Of note he underwent a prostate biopsy on 8/19/2021 which was negative. Before that he had an MR that showed a PI-RADS 4 lesion. His PSA at that time was around 7.6. He has had a rough year. He has broken his left shoulder and ribs from a fall. He is still recovering from that. He had his PSA checked with his primary care doctor on 1/31/2023 and it was up to 9.9. He also has a history of hypogonadism and has been on testosterone replacement. He has significant lower urinary tract symptoms but has been off of the Ditropan XL we started him on. He thinks it might have helped him but he needs a refill. Importantly he was diagnosed with a urinary tract infection 1 month ago at an urgent care. They treated him with 5 days of antibiotics and his symptoms resolved. He has no dysuria or hematuria at this time. His PSA on recheck today is down to 5.5.         From previous note:  Patient is a

## 2023-07-31 ENCOUNTER — OFFICE VISIT (OUTPATIENT)
Dept: ONCOLOGY | Age: 53
End: 2023-07-31
Payer: COMMERCIAL

## 2023-07-31 ENCOUNTER — HOSPITAL ENCOUNTER (OUTPATIENT)
Dept: LAB | Age: 53
Discharge: HOME | End: 2023-08-03
Payer: COMMERCIAL

## 2023-07-31 VITALS
OXYGEN SATURATION: 95 % | DIASTOLIC BLOOD PRESSURE: 90 MMHG | WEIGHT: 208 LBS | SYSTOLIC BLOOD PRESSURE: 127 MMHG | HEART RATE: 82 BPM | BODY MASS INDEX: 29.12 KG/M2 | RESPIRATION RATE: 16 BRPM | HEIGHT: 71 IN | TEMPERATURE: 97.8 F

## 2023-07-31 DIAGNOSIS — R97.20 ELEVATED PROSTATE SPECIFIC ANTIGEN (PSA): ICD-10-CM

## 2023-07-31 DIAGNOSIS — R30.0 DYSURIA: ICD-10-CM

## 2023-07-31 DIAGNOSIS — R97.20 ELEVATED PSA: Primary | ICD-10-CM

## 2023-07-31 LAB
APPEARANCE UR: CLEAR
BILIRUB UR QL: NEGATIVE
COLOR UR: YELLOW
GLUCOSE UR STRIP.AUTO-MCNC: NEGATIVE MG/DL
HGB UR QL STRIP: NEGATIVE
KETONES UR QL STRIP.AUTO: NEGATIVE MG/DL
LEUKOCYTE ESTERASE UR QL STRIP.AUTO: NEGATIVE
NITRITE UR QL STRIP.AUTO: NEGATIVE
PH UR STRIP: 5.5 (ref 5–9)
PROT UR STRIP-MCNC: NEGATIVE MG/DL
PSA SERPL-MCNC: 3.6 NG/ML
SP GR UR REFRACTOMETRY: <=1.005 (ref 1–1.02)
UROBILINOGEN UR QL STRIP.AUTO: 0.2 EU/DL

## 2023-07-31 PROCEDURE — 84153 ASSAY OF PSA TOTAL: CPT

## 2023-07-31 PROCEDURE — 3080F DIAST BP >= 90 MM HG: CPT | Performed by: UROLOGY

## 2023-07-31 PROCEDURE — 87086 URINE CULTURE/COLONY COUNT: CPT

## 2023-07-31 PROCEDURE — 36415 COLL VENOUS BLD VENIPUNCTURE: CPT

## 2023-07-31 PROCEDURE — 99213 OFFICE O/P EST LOW 20 MIN: CPT | Performed by: UROLOGY

## 2023-07-31 PROCEDURE — 3074F SYST BP LT 130 MM HG: CPT | Performed by: UROLOGY

## 2023-07-31 PROCEDURE — 81003 URINALYSIS AUTO W/O SCOPE: CPT

## 2023-07-31 RX ORDER — OXYBUTYNIN CHLORIDE 10 MG/1
10 TABLET, EXTENDED RELEASE ORAL DAILY
Qty: 30 TABLET | Refills: 3 | Status: SHIPPED | OUTPATIENT
Start: 2023-07-31

## 2023-07-31 RX ORDER — PHENAZOPYRIDINE HYDROCHLORIDE 100 MG/1
100 TABLET, FILM COATED ORAL 3 TIMES DAILY PRN
Qty: 10 TABLET | Refills: 0 | Status: SHIPPED | OUTPATIENT
Start: 2023-07-31 | End: 2024-07-30

## 2023-07-31 ASSESSMENT — ENCOUNTER SYMPTOMS
GASTROINTESTINAL NEGATIVE: 1
RESPIRATORY NEGATIVE: 1

## 2023-07-31 ASSESSMENT — PATIENT HEALTH QUESTIONNAIRE - PHQ9
2. FEELING DOWN, DEPRESSED OR HOPELESS: 0
SUM OF ALL RESPONSES TO PHQ QUESTIONS 1-9: 0
SUM OF ALL RESPONSES TO PHQ9 QUESTIONS 1 & 2: 0
SUM OF ALL RESPONSES TO PHQ QUESTIONS 1-9: 0
1. LITTLE INTEREST OR PLEASURE IN DOING THINGS: 0
SUM OF ALL RESPONSES TO PHQ QUESTIONS 1-9: 0
SUM OF ALL RESPONSES TO PHQ QUESTIONS 1-9: 0

## 2023-07-31 NOTE — PATIENT INSTRUCTIONS
Patient Instructions from Today's Visit    Reason for Visit:  Follow up    Diagnosis Information:  [email or web address]      Plan: We are going to recheck your urine today for infection  We will send in a prescription of pyridium to hopefully help with the painful urination. Follow Up: In 6 months with repeat labs. If the psa is elevated more than previous result, we will plan to repeat your MRI and see you back to review. Recent Lab Results:  Not resulted as of this note. Treatment Summary has been discussed and given to patient:   N/a      -------------------------------------------------------------------------------------------------------------------    Patient does express an interest in My Chart. My Chart log in information explained on the after visit summary printout at the [address] desk.     JAMEEL Mota Calm

## 2023-08-02 LAB
BACTERIA SPEC CULT: NORMAL
SERVICE CMNT-IMP: NORMAL

## 2023-08-24 ENCOUNTER — OFFICE VISIT (OUTPATIENT)
Dept: ORTHOPEDIC SURGERY | Age: 53
End: 2023-08-24
Payer: COMMERCIAL

## 2023-08-24 DIAGNOSIS — S42.201A CLOSED FRACTURE OF PROXIMAL END OF RIGHT HUMERUS, UNSPECIFIED FRACTURE MORPHOLOGY, INITIAL ENCOUNTER: Primary | ICD-10-CM

## 2023-08-24 PROCEDURE — 99204 OFFICE O/P NEW MOD 45 MIN: CPT | Performed by: PHYSICIAN ASSISTANT

## 2023-08-24 PROCEDURE — 20610 DRAIN/INJ JOINT/BURSA W/O US: CPT | Performed by: PHYSICIAN ASSISTANT

## 2023-08-24 RX ORDER — CYCLOBENZAPRINE HCL 5 MG
5-10 TABLET ORAL NIGHTLY PRN
Qty: 30 TABLET | Refills: 0 | Status: SHIPPED | OUTPATIENT
Start: 2023-08-24

## 2023-08-24 RX ORDER — METHYLPREDNISOLONE ACETATE 40 MG/ML
80 INJECTION, SUSPENSION INTRA-ARTICULAR; INTRALESIONAL; INTRAMUSCULAR; SOFT TISSUE ONCE
Status: COMPLETED | OUTPATIENT
Start: 2023-08-24 | End: 2023-08-24

## 2023-08-24 RX ADMIN — METHYLPREDNISOLONE ACETATE 80 MG: 40 INJECTION, SUSPENSION INTRA-ARTICULAR; INTRALESIONAL; INTRAMUSCULAR; SOFT TISSUE at 09:32

## 2023-08-28 NOTE — PROGRESS NOTES
Name: Veronika Correa  YOB: 1970  Gender: male  MRN: [id number]    CC:   Chief Complaint   Patient presents with    Shoulder Pain     Right shoulder pain    Right shoulder pain    HPI:  This patient presents with a 10 month history of Right shoulder pain. The patient sustained a proximal humerus fracture in October 2022. The patient states that he was in a cast for 3 weeks. He has been having continued pain in the anterior, posterior and deep aspects into the shoulder as well as scapular neck pain. Does note grinding, clicking, popping and locking. Notes decreased range of motion and feelings of instability/pain. Notes interference with sleep as he used to be a side sleeper secondary to back pains. He has been doing physical therapy without resolution of symptoms. Also notes some numbness and tingling in the dorsum of the hand.     No Known Allergies  Past Medical History:   Diagnosis Date    Androgen deficiency     Anxiety disorder     Back pain     Chronic back pain     Dermatophytosis of nail     Discitis of lumbar region     History of Lyme disease     2006    Hyperlipidemia     Hypertension     Major depression     Male hypogonadism     Mitral valve prolapse     Mitral valve regurgitation     Obstructive sleep apnea     Prostate enlargement      Past Surgical History:   Procedure Laterality Date    LUMBAR LAMINECTOMY  2003    L3-4 laminectomy/discectomy     Family History   Problem Relation Age of Onset    Other Mother         colitis    Macular Degen Mother     Thyroid Cancer Neg Hx     Cancer Paternal Uncle         glioblastoma    Depression Sister     Neuropathy Mother     Pacemaker Mother     Thyroid Disease Mother         hypothyroidism    Cancer Cousin         lymphoma     Social History     Socioeconomic History    Marital status: Single     Spouse name: Not on file    Number of children: Not on file    Years of education: Not on file    Highest education level: Not on file

## 2024-01-02 ENCOUNTER — APPOINTMENT (OUTPATIENT)
Dept: MRI IMAGING | Age: 54
DRG: 457 | End: 2024-01-02
Payer: COMMERCIAL

## 2024-01-02 ENCOUNTER — HOSPITAL ENCOUNTER (INPATIENT)
Age: 54
LOS: 9 days | Discharge: NURSING, REHAB OR LONG-TERM CARE | DRG: 457 | End: 2024-01-11
Attending: EMERGENCY MEDICINE | Admitting: ORTHOPAEDIC SURGERY
Payer: COMMERCIAL

## 2024-01-02 ENCOUNTER — PREP FOR PROCEDURE (OUTPATIENT)
Dept: ORTHOPEDIC SURGERY | Age: 54
End: 2024-01-02

## 2024-01-02 DIAGNOSIS — S32.001K CLOSED BURST FRACTURE OF LUMBAR VERTEBRA WITH NONUNION, SUBSEQUENT ENCOUNTER: Primary | ICD-10-CM

## 2024-01-02 DIAGNOSIS — S32.001K CLOSED BURST FRACTURE OF LUMBAR VERTEBRA WITH NONUNION, SUBSEQUENT ENCOUNTER: ICD-10-CM

## 2024-01-02 DIAGNOSIS — S32.001G CLOSED BURST FRACTURE OF LUMBAR VERTEBRA WITH DELAYED HEALING, SUBSEQUENT ENCOUNTER: Primary | ICD-10-CM

## 2024-01-02 PROBLEM — S32.001A LUMBAR BURST FRACTURE (HCC): Status: ACTIVE | Noted: 2024-01-02

## 2024-01-02 PROBLEM — S32.001A LUMBAR BURST FRACTURE, CLOSED, INITIAL ENCOUNTER (HCC): Status: ACTIVE | Noted: 2024-01-02

## 2024-01-02 LAB
ABO + RH BLD: NORMAL
ANION GAP SERPL CALC-SCNC: 6 MMOL/L (ref 2–11)
APPEARANCE UR: CLEAR
BILIRUB UR QL: NEGATIVE
BLOOD GROUP ANTIBODIES SERPL: NORMAL
BUN SERPL-MCNC: 15 MG/DL (ref 6–23)
CALCIUM SERPL-MCNC: 9.2 MG/DL (ref 8.3–10.4)
CHLORIDE SERPL-SCNC: 100 MMOL/L (ref 103–113)
CO2 SERPL-SCNC: 27 MMOL/L (ref 21–32)
COLOR UR: ABNORMAL
CREAT SERPL-MCNC: 1.2 MG/DL (ref 0.8–1.5)
GLUCOSE SERPL-MCNC: 100 MG/DL (ref 65–100)
GLUCOSE UR STRIP.AUTO-MCNC: NEGATIVE MG/DL
HGB UR QL STRIP: NEGATIVE
KETONES UR QL STRIP.AUTO: ABNORMAL MG/DL
LEUKOCYTE ESTERASE UR QL STRIP.AUTO: NEGATIVE
NITRITE UR QL STRIP.AUTO: NEGATIVE
PH UR STRIP: 5.5 (ref 5–9)
POTASSIUM SERPL-SCNC: 3.7 MMOL/L (ref 3.5–5.1)
PROT UR STRIP-MCNC: NEGATIVE MG/DL
SODIUM SERPL-SCNC: 133 MMOL/L (ref 136–146)
SP GR UR REFRACTOMETRY: 1.02 (ref 1–1.02)
SPECIMEN EXP DATE BLD: NORMAL
UROBILINOGEN UR QL STRIP.AUTO: 1 EU/DL (ref 0.2–1)

## 2024-01-02 PROCEDURE — 86901 BLOOD TYPING SEROLOGIC RH(D): CPT

## 2024-01-02 PROCEDURE — 86900 BLOOD TYPING SEROLOGIC ABO: CPT

## 2024-01-02 PROCEDURE — 99285 EMERGENCY DEPT VISIT HI MDM: CPT

## 2024-01-02 PROCEDURE — 36415 COLL VENOUS BLD VENIPUNCTURE: CPT

## 2024-01-02 PROCEDURE — 80048 BASIC METABOLIC PNL TOTAL CA: CPT

## 2024-01-02 PROCEDURE — 6370000000 HC RX 637 (ALT 250 FOR IP): Performed by: EMERGENCY MEDICINE

## 2024-01-02 PROCEDURE — 72148 MRI LUMBAR SPINE W/O DYE: CPT

## 2024-01-02 PROCEDURE — 86850 RBC ANTIBODY SCREEN: CPT

## 2024-01-02 PROCEDURE — 1100000000 HC RM PRIVATE

## 2024-01-02 PROCEDURE — 2580000003 HC RX 258: Performed by: ANESTHESIOLOGY

## 2024-01-02 PROCEDURE — 6370000000 HC RX 637 (ALT 250 FOR IP): Performed by: ANESTHESIOLOGY

## 2024-01-02 PROCEDURE — 81003 URINALYSIS AUTO W/O SCOPE: CPT

## 2024-01-02 RX ORDER — HYDROCODONE BITARTRATE AND ACETAMINOPHEN 10; 325 MG/1; MG/1
1 TABLET ORAL
Status: COMPLETED | OUTPATIENT
Start: 2024-01-02 | End: 2024-01-02

## 2024-01-02 RX ORDER — ATORVASTATIN CALCIUM 40 MG/1
40 TABLET, FILM COATED ORAL DAILY
Status: DISCONTINUED | OUTPATIENT
Start: 2024-01-03 | End: 2024-01-11 | Stop reason: HOSPADM

## 2024-01-02 RX ORDER — MIDAZOLAM HYDROCHLORIDE 2 MG/2ML
2 INJECTION, SOLUTION INTRAMUSCULAR; INTRAVENOUS
Status: DISCONTINUED | OUTPATIENT
Start: 2024-01-02 | End: 2024-01-03 | Stop reason: HOSPADM

## 2024-01-02 RX ORDER — FENTANYL CITRATE 50 UG/ML
100 INJECTION, SOLUTION INTRAMUSCULAR; INTRAVENOUS PRN
Status: DISCONTINUED | OUTPATIENT
Start: 2024-01-02 | End: 2024-01-03 | Stop reason: HOSPADM

## 2024-01-02 RX ORDER — DOCUSATE SODIUM 100 MG/1
100 CAPSULE, LIQUID FILLED ORAL DAILY
Status: DISCONTINUED | OUTPATIENT
Start: 2024-01-03 | End: 2024-01-11 | Stop reason: HOSPADM

## 2024-01-02 RX ORDER — SODIUM CHLORIDE, SODIUM LACTATE, POTASSIUM CHLORIDE, CALCIUM CHLORIDE 600; 310; 30; 20 MG/100ML; MG/100ML; MG/100ML; MG/100ML
INJECTION, SOLUTION INTRAVENOUS CONTINUOUS
Status: DISCONTINUED | OUTPATIENT
Start: 2024-01-02 | End: 2024-01-03 | Stop reason: HOSPADM

## 2024-01-02 RX ORDER — ONDANSETRON 2 MG/ML
4 INJECTION INTRAMUSCULAR; INTRAVENOUS EVERY 6 HOURS PRN
Status: DISCONTINUED | OUTPATIENT
Start: 2024-01-02 | End: 2024-01-03 | Stop reason: SDUPTHER

## 2024-01-02 RX ORDER — POLYETHYLENE GLYCOL 3350 17 G/17G
17 POWDER, FOR SOLUTION ORAL DAILY
Status: DISCONTINUED | OUTPATIENT
Start: 2024-01-03 | End: 2024-01-03 | Stop reason: SDUPTHER

## 2024-01-02 RX ORDER — OXYCODONE HYDROCHLORIDE AND ACETAMINOPHEN 5; 325 MG/1; MG/1
1 TABLET ORAL EVERY 4 HOURS PRN
Status: DISCONTINUED | OUTPATIENT
Start: 2024-01-02 | End: 2024-01-03 | Stop reason: SDUPTHER

## 2024-01-02 RX ORDER — OXYCODONE HYDROCHLORIDE 5 MG/1
5 TABLET ORAL
Status: COMPLETED | OUTPATIENT
Start: 2024-01-02 | End: 2024-01-02

## 2024-01-02 RX ORDER — IBUPROFEN 800 MG/1
800 TABLET ORAL
Status: COMPLETED | OUTPATIENT
Start: 2024-01-02 | End: 2024-01-02

## 2024-01-02 RX ORDER — ACETAMINOPHEN 500 MG
500 TABLET ORAL EVERY 6 HOURS PRN
COMMUNITY

## 2024-01-02 RX ORDER — HYDROMORPHONE HYDROCHLORIDE 2 MG/ML
0.5 INJECTION, SOLUTION INTRAMUSCULAR; INTRAVENOUS; SUBCUTANEOUS EVERY 5 MIN PRN
Status: DISCONTINUED | OUTPATIENT
Start: 2024-01-02 | End: 2024-01-11 | Stop reason: HOSPADM

## 2024-01-02 RX ORDER — LIDOCAINE HYDROCHLORIDE 10 MG/ML
1 INJECTION, SOLUTION INFILTRATION; PERINEURAL
Status: DISCONTINUED | OUTPATIENT
Start: 2024-01-02 | End: 2024-01-03 | Stop reason: HOSPADM

## 2024-01-02 RX ORDER — OXYBUTYNIN CHLORIDE 10 MG/1
10 TABLET, EXTENDED RELEASE ORAL DAILY
Status: DISCONTINUED | OUTPATIENT
Start: 2024-01-03 | End: 2024-01-11 | Stop reason: HOSPADM

## 2024-01-02 RX ORDER — ONDANSETRON 2 MG/ML
4 INJECTION INTRAMUSCULAR; INTRAVENOUS
Status: DISCONTINUED | OUTPATIENT
Start: 2024-01-02 | End: 2024-01-03 | Stop reason: SDUPTHER

## 2024-01-02 RX ORDER — OXYCODONE HYDROCHLORIDE AND ACETAMINOPHEN 5; 325 MG/1; MG/1
2 TABLET ORAL EVERY 4 HOURS PRN
Status: DISCONTINUED | OUTPATIENT
Start: 2024-01-02 | End: 2024-01-03 | Stop reason: SDUPTHER

## 2024-01-02 RX ORDER — LISINOPRIL 20 MG/1
40 TABLET ORAL DAILY
Status: DISCONTINUED | OUTPATIENT
Start: 2024-01-03 | End: 2024-01-11 | Stop reason: HOSPADM

## 2024-01-02 RX ORDER — FENTANYL CITRATE 50 UG/ML
50 INJECTION, SOLUTION INTRAMUSCULAR; INTRAVENOUS PRN
Status: DISCONTINUED | OUTPATIENT
Start: 2024-01-02 | End: 2024-01-03 | Stop reason: HOSPADM

## 2024-01-02 RX ORDER — MELOXICAM 15 MG/1
15 TABLET ORAL DAILY
COMMUNITY
Start: 2023-12-18

## 2024-01-02 RX ORDER — HYDROMORPHONE HYDROCHLORIDE 1 MG/ML
0.5 INJECTION, SOLUTION INTRAMUSCULAR; INTRAVENOUS; SUBCUTANEOUS
Status: DISCONTINUED | OUTPATIENT
Start: 2024-01-02 | End: 2024-01-11 | Stop reason: HOSPADM

## 2024-01-02 RX ORDER — HYDROMORPHONE HYDROCHLORIDE 1 MG/ML
0.25 INJECTION, SOLUTION INTRAMUSCULAR; INTRAVENOUS; SUBCUTANEOUS
Status: DISCONTINUED | OUTPATIENT
Start: 2024-01-02 | End: 2024-01-11 | Stop reason: HOSPADM

## 2024-01-02 RX ORDER — SODIUM CHLORIDE, SODIUM LACTATE, POTASSIUM CHLORIDE, CALCIUM CHLORIDE 600; 310; 30; 20 MG/100ML; MG/100ML; MG/100ML; MG/100ML
INJECTION, SOLUTION INTRAVENOUS CONTINUOUS
Status: DISCONTINUED | OUTPATIENT
Start: 2024-01-02 | End: 2024-01-11 | Stop reason: HOSPADM

## 2024-01-02 RX ORDER — PROMETHAZINE HYDROCHLORIDE 25 MG/1
25 TABLET ORAL EVERY 6 HOURS PRN
Status: DISCONTINUED | OUTPATIENT
Start: 2024-01-02 | End: 2024-01-03 | Stop reason: SDUPTHER

## 2024-01-02 RX ORDER — CYCLOBENZAPRINE HCL 10 MG
10 TABLET ORAL
Status: COMPLETED | OUTPATIENT
Start: 2024-01-02 | End: 2024-01-02

## 2024-01-02 RX ADMIN — HYDROCODONE BITARTRATE AND ACETAMINOPHEN 1 TABLET: 10; 325 TABLET ORAL at 11:40

## 2024-01-02 RX ADMIN — OXYCODONE 5 MG: 5 TABLET ORAL at 20:04

## 2024-01-02 RX ADMIN — IBUPROFEN 800 MG: 800 TABLET, FILM COATED ORAL at 11:40

## 2024-01-02 RX ADMIN — SODIUM CHLORIDE, POTASSIUM CHLORIDE, SODIUM LACTATE AND CALCIUM CHLORIDE: 600; 310; 30; 20 INJECTION, SOLUTION INTRAVENOUS at 22:43

## 2024-01-02 RX ADMIN — CYCLOBENZAPRINE 10 MG: 10 TABLET, FILM COATED ORAL at 11:39

## 2024-01-02 ASSESSMENT — PAIN DESCRIPTION - LOCATION
LOCATION: BACK
LOCATION: BACK

## 2024-01-02 ASSESSMENT — PAIN DESCRIPTION - DIRECTION: RADIATING_TOWARDS: ALL OVER BACK

## 2024-01-02 ASSESSMENT — ENCOUNTER SYMPTOMS
FACIAL SWELLING: 0
EYE REDNESS: 0
COUGH: 0
COLOR CHANGE: 0
ABDOMINAL PAIN: 0
RHINORRHEA: 0
BACK PAIN: 1
VOMITING: 0
SHORTNESS OF BREATH: 0
EYE DISCHARGE: 0
NAUSEA: 0
DIARRHEA: 0

## 2024-01-02 ASSESSMENT — PAIN DESCRIPTION - ONSET: ONSET: SUDDEN

## 2024-01-02 ASSESSMENT — PAIN SCALES - GENERAL
PAINLEVEL_OUTOF10: 8
PAINLEVEL_OUTOF10: 10
PAINLEVEL_OUTOF10: 10
PAINLEVEL_OUTOF10: 6
PAINLEVEL_OUTOF10: 10

## 2024-01-02 ASSESSMENT — PAIN DESCRIPTION - PAIN TYPE: TYPE: ACUTE PAIN

## 2024-01-02 ASSESSMENT — LIFESTYLE VARIABLES
HOW MANY STANDARD DRINKS CONTAINING ALCOHOL DO YOU HAVE ON A TYPICAL DAY: PATIENT DOES NOT DRINK
HOW OFTEN DO YOU HAVE A DRINK CONTAINING ALCOHOL: NEVER

## 2024-01-02 ASSESSMENT — PAIN DESCRIPTION - FREQUENCY: FREQUENCY: CONTINUOUS

## 2024-01-02 ASSESSMENT — PAIN - FUNCTIONAL ASSESSMENT
PAIN_FUNCTIONAL_ASSESSMENT: 0-10
PAIN_FUNCTIONAL_ASSESSMENT: 0-10
PAIN_FUNCTIONAL_ASSESSMENT: ACTIVITIES ARE NOT PREVENTED

## 2024-01-02 ASSESSMENT — PAIN DESCRIPTION - DESCRIPTORS: DESCRIPTORS: STABBING;TEARING

## 2024-01-02 ASSESSMENT — PAIN DESCRIPTION - ORIENTATION: ORIENTATION: LOWER;MID

## 2024-01-02 NOTE — ED TRIAGE NOTES
History of back problems.  Complaints of continued pain getting worse.  No relief with current treatments.  [facility]

## 2024-01-02 NOTE — ED PROVIDER NOTES
Emergency Department Provider Note       PCP: Ede Casillas Jr., MD   Age: 53 y.o.   Sex: male     DISPOSITION       No diagnosis found.    Medical Decision Making     Complexity of Problems Addressed:  1 or more acute illnesses that pose a threat to life or bodily function.     Data Reviewed and Analyzed:  I independently ordered and reviewed each unique test.  I reviewed external records: ED visit note from an outside group.  I reviewed external records: previous imaging study including radiologist interpretation.   The patients assessment required an independent historian: Brother-in-law at bedside.  The reason they were needed is important historical information not provided by the patient.    I interpreted the MRI report from today which reveals.    Discussion of management or test interpretation.  53-year-old gentleman with decidedly UN-stable burst fracture of L1  Discussed with orthospine who will admit the patient and take him to the operating room tomorrow.  The patient was admitted and I have discussed patient management with the admitting provider.    Risk of Complications and/or Morbidity of Patient Management:  Prescription drug management performed.  Shared medical decision making was utilized in creating the patients health plan today.         History       53-year-old gentleman presents to the ER for worsening back pain.  He has a history of chronic back pain ever since laminectomy and microdiscectomy years ago.  Pain started worsening around Lelia miguel.  No discrete trauma but patient relates he had been doing a lot of bending over and straightening up lifting things.    He went to the ER at [facility] twice on December 30, the first visit he was deemed to have sciatica and discharged.  Patient related some urinary incontinence at that time.  He then checked back in that same day and ultimately underwent MRI of his spine revealing an L1 burst fracture with retropulsion of fragments

## 2024-01-02 NOTE — ACP (ADVANCE CARE PLANNING)
Advance Care Planning   Healthcare Decision Maker:    Primary Decision Maker: [facility] - [facility] - [phone]    Click here to complete Healthcare Decision Makers including selection of the Healthcare Decision Maker Relationship (ie \"Primary\").

## 2024-01-03 ENCOUNTER — APPOINTMENT (OUTPATIENT)
Dept: GENERAL RADIOLOGY | Age: 54
DRG: 457 | End: 2024-01-03
Payer: COMMERCIAL

## 2024-01-03 ENCOUNTER — ANESTHESIA EVENT (OUTPATIENT)
Dept: SURGERY | Age: 54
End: 2024-01-03
Payer: COMMERCIAL

## 2024-01-03 ENCOUNTER — ANESTHESIA (OUTPATIENT)
Dept: SURGERY | Age: 54
End: 2024-01-03
Payer: COMMERCIAL

## 2024-01-03 PROCEDURE — 3600000014 HC SURGERY LEVEL 4 ADDTL 15MIN: Performed by: ORTHOPAEDIC SURGERY

## 2024-01-03 PROCEDURE — 2580000003 HC RX 258: Performed by: ORTHOPAEDIC SURGERY

## 2024-01-03 PROCEDURE — 3600000004 HC SURGERY LEVEL 4 BASE: Performed by: ORTHOPAEDIC SURGERY

## 2024-01-03 PROCEDURE — 2500000003 HC RX 250 WO HCPCS: Performed by: ORTHOPAEDIC SURGERY

## 2024-01-03 PROCEDURE — 2500000003 HC RX 250 WO HCPCS

## 2024-01-03 PROCEDURE — 22612 ARTHRD PST TQ 1NTRSPC LUMBAR: CPT | Performed by: ORTHOPAEDIC SURGERY

## 2024-01-03 PROCEDURE — 3700000000 HC ANESTHESIA ATTENDED CARE: Performed by: ORTHOPAEDIC SURGERY

## 2024-01-03 PROCEDURE — 0RG70K1 FUSION OF 2 TO 7 THORACIC VERTEBRAL JOINTS WITH NONAUTOLOGOUS TISSUE SUBSTITUTE, POSTERIOR APPROACH, POSTERIOR COLUMN, OPEN APPROACH: ICD-10-PCS | Performed by: ORTHOPAEDIC SURGERY

## 2024-01-03 PROCEDURE — C1713 ANCHOR/SCREW BN/BN,TIS/BN: HCPCS | Performed by: ORTHOPAEDIC SURGERY

## 2024-01-03 PROCEDURE — 22614 ARTHRD PST TQ 1NTRSPC EA ADD: CPT | Performed by: ORTHOPAEDIC SURGERY

## 2024-01-03 PROCEDURE — 6360000002 HC RX W HCPCS: Performed by: ORTHOPAEDIC SURGERY

## 2024-01-03 PROCEDURE — 7100000000 HC PACU RECOVERY - FIRST 15 MIN: Performed by: ORTHOPAEDIC SURGERY

## 2024-01-03 PROCEDURE — 0SG10K1 FUSION OF 2 OR MORE LUMBAR VERTEBRAL JOINTS WITH NONAUTOLOGOUS TISSUE SUBSTITUTE, POSTERIOR APPROACH, POSTERIOR COLUMN, OPEN APPROACH: ICD-10-PCS | Performed by: ORTHOPAEDIC SURGERY

## 2024-01-03 PROCEDURE — 63047 LAM FACETEC & FORAMOT LUMBAR: CPT | Performed by: ORTHOPAEDIC SURGERY

## 2024-01-03 PROCEDURE — 3700000001 HC ADD 15 MINUTES (ANESTHESIA): Performed by: ORTHOPAEDIC SURGERY

## 2024-01-03 PROCEDURE — 6360000002 HC RX W HCPCS

## 2024-01-03 PROCEDURE — 2780000010 HC IMPLANT OTHER: Performed by: ORTHOPAEDIC SURGERY

## 2024-01-03 PROCEDURE — 0RGA0K1 FUSION OF THORACOLUMBAR VERTEBRAL JOINT WITH NONAUTOLOGOUS TISSUE SUBSTITUTE, POSTERIOR APPROACH, POSTERIOR COLUMN, OPEN APPROACH: ICD-10-PCS | Performed by: ORTHOPAEDIC SURGERY

## 2024-01-03 PROCEDURE — 1100000000 HC RM PRIVATE

## 2024-01-03 PROCEDURE — 2580000003 HC RX 258: Performed by: ANESTHESIOLOGY

## 2024-01-03 PROCEDURE — 2720000010 HC SURG SUPPLY STERILE: Performed by: ORTHOPAEDIC SURGERY

## 2024-01-03 PROCEDURE — 6370000000 HC RX 637 (ALT 250 FOR IP): Performed by: ORTHOPAEDIC SURGERY

## 2024-01-03 PROCEDURE — 72080 X-RAY EXAM THORACOLMB 2/> VW: CPT

## 2024-01-03 PROCEDURE — 7100000001 HC PACU RECOVERY - ADDTL 15 MIN: Performed by: ORTHOPAEDIC SURGERY

## 2024-01-03 PROCEDURE — 20930 SP BONE ALGRFT MORSEL ADD-ON: CPT | Performed by: ORTHOPAEDIC SURGERY

## 2024-01-03 PROCEDURE — 22842 INSERT SPINE FIXATION DEVICE: CPT | Performed by: ORTHOPAEDIC SURGERY

## 2024-01-03 PROCEDURE — 2580000003 HC RX 258

## 2024-01-03 PROCEDURE — 2709999900 HC NON-CHARGEABLE SUPPLY: Performed by: ORTHOPAEDIC SURGERY

## 2024-01-03 DEVICE — BIO DBM PLUS PUTTY (WITH CANCELLOUS)
Type: IMPLANTABLE DEVICE | Site: SPINE LUMBAR | Status: FUNCTIONAL
Brand: BIO DBM

## 2024-01-03 DEVICE — POLYAXIAL SCREW
Type: IMPLANTABLE DEVICE | Site: SPINE LUMBAR | Status: FUNCTIONAL
Brand: XIA 3 SYSTEM - SERRATO

## 2024-01-03 DEVICE — GRAFT BNE XL: Type: IMPLANTABLE DEVICE | Site: SPINE LUMBAR | Status: FUNCTIONAL

## 2024-01-03 DEVICE — TI ALLOY ROD
Type: IMPLANTABLE DEVICE | Site: SPINE LUMBAR | Status: FUNCTIONAL
Brand: XIA 3

## 2024-01-03 DEVICE — ALLOGRAFT BNE CHIP 1-4 MM 30 CC CRUSH CANC: Type: IMPLANTABLE DEVICE | Site: SPINE LUMBAR | Status: FUNCTIONAL

## 2024-01-03 DEVICE — 53-73 MM MULTI AXIAL CROSS CONNECTOR
Type: IMPLANTABLE DEVICE | Site: SPINE LUMBAR | Status: FUNCTIONAL
Brand: XIA 3

## 2024-01-03 DEVICE — BLOCKER
Type: IMPLANTABLE DEVICE | Site: SPINE LUMBAR | Status: FUNCTIONAL
Brand: XIA 4.5 SYSTEM -  XIA CT

## 2024-01-03 RX ORDER — ONDANSETRON 2 MG/ML
4 INJECTION INTRAMUSCULAR; INTRAVENOUS EVERY 6 HOURS PRN
Status: DISCONTINUED | OUTPATIENT
Start: 2024-01-03 | End: 2024-01-11 | Stop reason: HOSPADM

## 2024-01-03 RX ORDER — SODIUM CHLORIDE, SODIUM LACTATE, POTASSIUM CHLORIDE, CALCIUM CHLORIDE 600; 310; 30; 20 MG/100ML; MG/100ML; MG/100ML; MG/100ML
INJECTION, SOLUTION INTRAVENOUS ONCE
Status: CANCELLED | OUTPATIENT
Start: 2024-01-03

## 2024-01-03 RX ORDER — PROMETHAZINE HYDROCHLORIDE 25 MG/1
12.5 TABLET ORAL EVERY 6 HOURS PRN
Status: DISCONTINUED | OUTPATIENT
Start: 2024-01-03 | End: 2024-01-11 | Stop reason: HOSPADM

## 2024-01-03 RX ORDER — OXYCODONE HYDROCHLORIDE 5 MG/1
5 TABLET ORAL EVERY 4 HOURS PRN
Status: DISCONTINUED | OUTPATIENT
Start: 2024-01-03 | End: 2024-01-11 | Stop reason: HOSPADM

## 2024-01-03 RX ORDER — ONDANSETRON 2 MG/ML
INJECTION INTRAMUSCULAR; INTRAVENOUS PRN
Status: DISCONTINUED | OUTPATIENT
Start: 2024-01-03 | End: 2024-01-03 | Stop reason: SDUPTHER

## 2024-01-03 RX ORDER — KETOROLAC TROMETHAMINE 30 MG/ML
15 INJECTION, SOLUTION INTRAMUSCULAR; INTRAVENOUS EVERY 6 HOURS
Status: COMPLETED | OUTPATIENT
Start: 2024-01-03 | End: 2024-01-04

## 2024-01-03 RX ORDER — SODIUM CHLORIDE 0.9 % (FLUSH) 0.9 %
5-40 SYRINGE (ML) INJECTION EVERY 12 HOURS SCHEDULED
Status: DISCONTINUED | OUTPATIENT
Start: 2024-01-03 | End: 2024-01-11 | Stop reason: HOSPADM

## 2024-01-03 RX ORDER — LIDOCAINE HYDROCHLORIDE ANHYDROUS AND DEXTROSE MONOHYDRATE 5; 400 G/100ML; MG/100ML
1 INJECTION, SOLUTION INTRAVENOUS CONTINUOUS
OUTPATIENT
Start: 2024-01-03 | End: 2024-01-04

## 2024-01-03 RX ORDER — SODIUM CHLORIDE 0.9 % (FLUSH) 0.9 %
5-40 SYRINGE (ML) INJECTION PRN
Status: DISCONTINUED | OUTPATIENT
Start: 2024-01-03 | End: 2024-01-11 | Stop reason: HOSPADM

## 2024-01-03 RX ORDER — SODIUM CHLORIDE, SODIUM LACTATE, POTASSIUM CHLORIDE, CALCIUM CHLORIDE 600; 310; 30; 20 MG/100ML; MG/100ML; MG/100ML; MG/100ML
INJECTION, SOLUTION INTRAVENOUS CONTINUOUS PRN
Status: DISCONTINUED | OUTPATIENT
Start: 2024-01-03 | End: 2024-01-03 | Stop reason: SDUPTHER

## 2024-01-03 RX ORDER — DIPHENHYDRAMINE HCL 25 MG
25 CAPSULE ORAL EVERY 6 HOURS PRN
Status: DISCONTINUED | OUTPATIENT
Start: 2024-01-03 | End: 2024-01-11 | Stop reason: HOSPADM

## 2024-01-03 RX ORDER — NEOSTIGMINE METHYLSULFATE 1 MG/ML
INJECTION, SOLUTION INTRAVENOUS PRN
Status: DISCONTINUED | OUTPATIENT
Start: 2024-01-03 | End: 2024-01-03 | Stop reason: SDUPTHER

## 2024-01-03 RX ORDER — HYDROMORPHONE HYDROCHLORIDE 2 MG/ML
0.5 INJECTION, SOLUTION INTRAMUSCULAR; INTRAVENOUS; SUBCUTANEOUS
Status: DISCONTINUED | OUTPATIENT
Start: 2024-01-03 | End: 2024-01-11 | Stop reason: HOSPADM

## 2024-01-03 RX ORDER — CYCLOBENZAPRINE HCL 5 MG
10 TABLET ORAL 3 TIMES DAILY PRN
Status: DISCONTINUED | OUTPATIENT
Start: 2024-01-03 | End: 2024-01-11 | Stop reason: HOSPADM

## 2024-01-03 RX ORDER — GLYCOPYRROLATE 0.2 MG/ML
INJECTION INTRAMUSCULAR; INTRAVENOUS PRN
Status: DISCONTINUED | OUTPATIENT
Start: 2024-01-03 | End: 2024-01-03 | Stop reason: SDUPTHER

## 2024-01-03 RX ORDER — BISACODYL 5 MG/1
5 TABLET, DELAYED RELEASE ORAL DAILY
Status: DISCONTINUED | OUTPATIENT
Start: 2024-01-03 | End: 2024-01-11 | Stop reason: HOSPADM

## 2024-01-03 RX ORDER — SODIUM CHLORIDE 9 MG/ML
INJECTION, SOLUTION INTRAVENOUS PRN
Status: DISCONTINUED | OUTPATIENT
Start: 2024-01-03 | End: 2024-01-11 | Stop reason: HOSPADM

## 2024-01-03 RX ORDER — ACETAMINOPHEN 325 MG/1
650 TABLET ORAL EVERY 6 HOURS
Status: DISCONTINUED | OUTPATIENT
Start: 2024-01-03 | End: 2024-01-11 | Stop reason: HOSPADM

## 2024-01-03 RX ORDER — TRANEXAMIC ACID 100 MG/ML
INJECTION, SOLUTION INTRAVENOUS PRN
Status: DISCONTINUED | OUTPATIENT
Start: 2024-01-03 | End: 2024-01-03 | Stop reason: SDUPTHER

## 2024-01-03 RX ORDER — VANCOMYCIN HYDROCHLORIDE 1 G/20ML
INJECTION, POWDER, LYOPHILIZED, FOR SOLUTION INTRAVENOUS PRN
Status: DISCONTINUED | OUTPATIENT
Start: 2024-01-03 | End: 2024-01-03 | Stop reason: HOSPADM

## 2024-01-03 RX ORDER — DEXAMETHASONE SODIUM PHOSPHATE 10 MG/ML
INJECTION INTRAMUSCULAR; INTRAVENOUS PRN
Status: DISCONTINUED | OUTPATIENT
Start: 2024-01-03 | End: 2024-01-03 | Stop reason: SDUPTHER

## 2024-01-03 RX ORDER — VECURONIUM BROMIDE 1 MG/ML
INJECTION, POWDER, LYOPHILIZED, FOR SOLUTION INTRAVENOUS PRN
Status: DISCONTINUED | OUTPATIENT
Start: 2024-01-03 | End: 2024-01-03 | Stop reason: SDUPTHER

## 2024-01-03 RX ORDER — DIPHENHYDRAMINE HYDROCHLORIDE 50 MG/ML
25 INJECTION INTRAMUSCULAR; INTRAVENOUS EVERY 6 HOURS PRN
Status: DISCONTINUED | OUTPATIENT
Start: 2024-01-03 | End: 2024-01-11 | Stop reason: HOSPADM

## 2024-01-03 RX ORDER — PROPOFOL 10 MG/ML
INJECTION, EMULSION INTRAVENOUS PRN
Status: DISCONTINUED | OUTPATIENT
Start: 2024-01-03 | End: 2024-01-03 | Stop reason: SDUPTHER

## 2024-01-03 RX ORDER — KETAMINE HYDROCHLORIDE 50 MG/ML
INJECTION, SOLUTION INTRAMUSCULAR; INTRAVENOUS PRN
Status: DISCONTINUED | OUTPATIENT
Start: 2024-01-03 | End: 2024-01-03 | Stop reason: SDUPTHER

## 2024-01-03 RX ORDER — HYDROMORPHONE HYDROCHLORIDE 2 MG/ML
INJECTION, SOLUTION INTRAMUSCULAR; INTRAVENOUS; SUBCUTANEOUS PRN
Status: DISCONTINUED | OUTPATIENT
Start: 2024-01-03 | End: 2024-01-03 | Stop reason: SDUPTHER

## 2024-01-03 RX ORDER — OXYCODONE HYDROCHLORIDE 5 MG/1
10 TABLET ORAL EVERY 4 HOURS PRN
Status: DISCONTINUED | OUTPATIENT
Start: 2024-01-03 | End: 2024-01-11 | Stop reason: HOSPADM

## 2024-01-03 RX ORDER — POLYETHYLENE GLYCOL 3350 17 G/17G
17 POWDER, FOR SOLUTION ORAL DAILY
Status: DISCONTINUED | OUTPATIENT
Start: 2024-01-03 | End: 2024-01-11 | Stop reason: HOSPADM

## 2024-01-03 RX ORDER — MIDAZOLAM HYDROCHLORIDE 1 MG/ML
INJECTION INTRAMUSCULAR; INTRAVENOUS PRN
Status: DISCONTINUED | OUTPATIENT
Start: 2024-01-03 | End: 2024-01-03 | Stop reason: SDUPTHER

## 2024-01-03 RX ORDER — SODIUM CHLORIDE 9 MG/ML
INJECTION, SOLUTION INTRAVENOUS CONTINUOUS
Status: ACTIVE | OUTPATIENT
Start: 2024-01-03 | End: 2024-01-04

## 2024-01-03 RX ORDER — LIDOCAINE HYDROCHLORIDE 20 MG/ML
INJECTION, SOLUTION EPIDURAL; INFILTRATION; INTRACAUDAL; PERINEURAL PRN
Status: DISCONTINUED | OUTPATIENT
Start: 2024-01-03 | End: 2024-01-03 | Stop reason: SDUPTHER

## 2024-01-03 RX ORDER — 0.9 % SODIUM CHLORIDE 0.9 %
500 INTRAVENOUS SOLUTION INTRAVENOUS ONCE
Status: COMPLETED | OUTPATIENT
Start: 2024-01-03 | End: 2024-01-03

## 2024-01-03 RX ORDER — ROCURONIUM BROMIDE 10 MG/ML
INJECTION, SOLUTION INTRAVENOUS PRN
Status: DISCONTINUED | OUTPATIENT
Start: 2024-01-03 | End: 2024-01-03 | Stop reason: SDUPTHER

## 2024-01-03 RX ORDER — EPHEDRINE SULFATE/0.9% NACL/PF 50 MG/5 ML
SYRINGE (ML) INTRAVENOUS PRN
Status: DISCONTINUED | OUTPATIENT
Start: 2024-01-03 | End: 2024-01-03 | Stop reason: SDUPTHER

## 2024-01-03 RX ADMIN — SODIUM CHLORIDE, POTASSIUM CHLORIDE, SODIUM LACTATE AND CALCIUM CHLORIDE: 600; 310; 30; 20 INJECTION, SOLUTION INTRAVENOUS at 11:16

## 2024-01-03 RX ADMIN — ACETAMINOPHEN 650 MG: 325 TABLET ORAL at 20:32

## 2024-01-03 RX ADMIN — PHENYLEPHRINE HYDROCHLORIDE 200 MCG: 10 INJECTION INTRAVENOUS at 12:47

## 2024-01-03 RX ADMIN — OXYCODONE HYDROCHLORIDE 10 MG: 5 TABLET ORAL at 18:15

## 2024-01-03 RX ADMIN — HYDROMORPHONE HYDROCHLORIDE 0.4 MG: 2 INJECTION INTRAMUSCULAR; INTRAVENOUS; SUBCUTANEOUS at 12:04

## 2024-01-03 RX ADMIN — CEFAZOLIN SODIUM 2000 MG: 100 INJECTION, POWDER, LYOPHILIZED, FOR SOLUTION INTRAVENOUS at 19:52

## 2024-01-03 RX ADMIN — VECURONIUM BROMIDE 2 MG: 1 INJECTION, POWDER, LYOPHILIZED, FOR SOLUTION INTRAVENOUS at 12:53

## 2024-01-03 RX ADMIN — HYDROMORPHONE HYDROCHLORIDE 0.5 MG: 1 INJECTION, SOLUTION INTRAMUSCULAR; INTRAVENOUS; SUBCUTANEOUS at 05:59

## 2024-01-03 RX ADMIN — PROPOFOL 200 MG: 10 INJECTION, EMULSION INTRAVENOUS at 12:08

## 2024-01-03 RX ADMIN — OXYCODONE HYDROCHLORIDE 10 MG: 5 TABLET ORAL at 23:42

## 2024-01-03 RX ADMIN — PHENYLEPHRINE HYDROCHLORIDE 300 MCG: 10 INJECTION INTRAVENOUS at 13:06

## 2024-01-03 RX ADMIN — Medication 5 MG: at 14:26

## 2024-01-03 RX ADMIN — HYDROMORPHONE HYDROCHLORIDE 0.4 MG: 2 INJECTION INTRAMUSCULAR; INTRAVENOUS; SUBCUTANEOUS at 12:42

## 2024-01-03 RX ADMIN — ROCURONIUM BROMIDE 50 MG: 10 INJECTION, SOLUTION INTRAVENOUS at 12:08

## 2024-01-03 RX ADMIN — SODIUM CHLORIDE: 9 INJECTION, SOLUTION INTRAVENOUS at 23:22

## 2024-01-03 RX ADMIN — GLYCOPYRROLATE 0.8 MG: 0.2 INJECTION INTRAMUSCULAR; INTRAVENOUS at 14:26

## 2024-01-03 RX ADMIN — SODIUM CHLORIDE 500 ML: 9 INJECTION, SOLUTION INTRAVENOUS at 16:42

## 2024-01-03 RX ADMIN — PHENYLEPHRINE HYDROCHLORIDE 300 MCG: 10 INJECTION INTRAVENOUS at 14:26

## 2024-01-03 RX ADMIN — PHENYLEPHRINE HYDROCHLORIDE 300 MCG: 10 INJECTION INTRAVENOUS at 14:15

## 2024-01-03 RX ADMIN — BISACODYL 5 MG: 5 TABLET, COATED ORAL at 18:16

## 2024-01-03 RX ADMIN — POLYETHYLENE GLYCOL 3350 17 G: 17 POWDER, FOR SOLUTION ORAL at 18:05

## 2024-01-03 RX ADMIN — PHENYLEPHRINE HYDROCHLORIDE 200 MCG: 10 INJECTION INTRAVENOUS at 12:31

## 2024-01-03 RX ADMIN — Medication 10 MG: at 12:58

## 2024-01-03 RX ADMIN — VECURONIUM BROMIDE 2 MG: 1 INJECTION, POWDER, LYOPHILIZED, FOR SOLUTION INTRAVENOUS at 13:00

## 2024-01-03 RX ADMIN — KETAMINE HYDROCHLORIDE 10 MG: 50 INJECTION, SOLUTION INTRAMUSCULAR; INTRAVENOUS at 12:54

## 2024-01-03 RX ADMIN — MIDAZOLAM 2 MG: 1 INJECTION INTRAMUSCULAR; INTRAVENOUS at 11:58

## 2024-01-03 RX ADMIN — VECURONIUM BROMIDE 1 MG: 1 INJECTION, POWDER, LYOPHILIZED, FOR SOLUTION INTRAVENOUS at 13:56

## 2024-01-03 RX ADMIN — KETOROLAC TROMETHAMINE 15 MG: 30 INJECTION, SOLUTION INTRAMUSCULAR; INTRAVENOUS at 18:07

## 2024-01-03 RX ADMIN — SODIUM CHLORIDE, SODIUM LACTATE, POTASSIUM CHLORIDE, AND CALCIUM CHLORIDE: 600; 310; 30; 20 INJECTION, SOLUTION INTRAVENOUS at 12:36

## 2024-01-03 RX ADMIN — Medication 2 G: at 12:23

## 2024-01-03 RX ADMIN — CYCLOBENZAPRINE HYDROCHLORIDE 10 MG: 5 TABLET, FILM COATED ORAL at 15:33

## 2024-01-03 RX ADMIN — SODIUM CHLORIDE, PRESERVATIVE FREE 5 ML: 5 INJECTION INTRAVENOUS at 19:53

## 2024-01-03 RX ADMIN — ACETAMINOPHEN 650 MG: 325 TABLET ORAL at 18:06

## 2024-01-03 RX ADMIN — PHENYLEPHRINE HYDROCHLORIDE 300 MCG: 10 INJECTION INTRAVENOUS at 14:03

## 2024-01-03 RX ADMIN — LIDOCAINE HYDROCHLORIDE 100 MG: 20 INJECTION, SOLUTION EPIDURAL; INFILTRATION; INTRACAUDAL; PERINEURAL at 12:08

## 2024-01-03 RX ADMIN — TRANEXAMIC ACID 1000 MG: 100 INJECTION, SOLUTION INTRAVENOUS at 12:18

## 2024-01-03 RX ADMIN — KETOROLAC TROMETHAMINE 15 MG: 30 INJECTION, SOLUTION INTRAMUSCULAR; INTRAVENOUS at 20:31

## 2024-01-03 RX ADMIN — PHENYLEPHRINE HYDROCHLORIDE 200 MCG: 10 INJECTION INTRAVENOUS at 13:35

## 2024-01-03 RX ADMIN — ATORVASTATIN CALCIUM 40 MG: 40 TABLET, FILM COATED ORAL at 08:53

## 2024-01-03 RX ADMIN — ONDANSETRON 4 MG: 2 INJECTION INTRAMUSCULAR; INTRAVENOUS at 12:23

## 2024-01-03 RX ADMIN — PROPOFOL 70 MG: 10 INJECTION, EMULSION INTRAVENOUS at 12:42

## 2024-01-03 RX ADMIN — KETAMINE HYDROCHLORIDE 20 MG: 50 INJECTION, SOLUTION INTRAMUSCULAR; INTRAVENOUS at 12:08

## 2024-01-03 RX ADMIN — PHENYLEPHRINE HYDROCHLORIDE 300 MCG: 10 INJECTION INTRAVENOUS at 12:50

## 2024-01-03 RX ADMIN — SODIUM CHLORIDE: 9 INJECTION, SOLUTION INTRAVENOUS at 18:59

## 2024-01-03 RX ADMIN — KETAMINE HYDROCHLORIDE 10 MG: 50 INJECTION, SOLUTION INTRAMUSCULAR; INTRAVENOUS at 13:57

## 2024-01-03 RX ADMIN — Medication 3 AMPULE: at 11:19

## 2024-01-03 RX ADMIN — PHENYLEPHRINE HYDROCHLORIDE 200 MCG: 10 INJECTION INTRAVENOUS at 12:28

## 2024-01-03 RX ADMIN — PHENYLEPHRINE HYDROCHLORIDE 300 MCG: 10 INJECTION INTRAVENOUS at 12:35

## 2024-01-03 RX ADMIN — DEXAMETHASONE SODIUM PHOSPHATE 8 MG: 10 INJECTION INTRAMUSCULAR; INTRAVENOUS at 12:23

## 2024-01-03 RX ADMIN — PHENYLEPHRINE HYDROCHLORIDE 300 MCG: 10 INJECTION INTRAVENOUS at 13:47

## 2024-01-03 ASSESSMENT — PAIN DESCRIPTION - DESCRIPTORS
DESCRIPTORS: ACHING;DISCOMFORT;POUNDING
DESCRIPTORS: SHARP;SHOOTING
DESCRIPTORS: THROBBING

## 2024-01-03 ASSESSMENT — PAIN SCALES - GENERAL
PAINLEVEL_OUTOF10: 7
PAINLEVEL_OUTOF10: 0
PAINLEVEL_OUTOF10: 10
PAINLEVEL_OUTOF10: 8

## 2024-01-03 ASSESSMENT — PAIN DESCRIPTION - ORIENTATION
ORIENTATION: POSTERIOR
ORIENTATION: POSTERIOR
ORIENTATION: LOWER;MID

## 2024-01-03 ASSESSMENT — PAIN DESCRIPTION - ONSET
ONSET: ON-GOING
ONSET: ON-GOING
ONSET: PROGRESSIVE

## 2024-01-03 ASSESSMENT — PAIN - FUNCTIONAL ASSESSMENT
PAIN_FUNCTIONAL_ASSESSMENT: PREVENTS OR INTERFERES SOME ACTIVE ACTIVITIES AND ADLS
PAIN_FUNCTIONAL_ASSESSMENT: PREVENTS OR INTERFERES WITH MANY ACTIVE NOT PASSIVE ACTIVITIES
PAIN_FUNCTIONAL_ASSESSMENT: 0-10
PAIN_FUNCTIONAL_ASSESSMENT: ADULT NONVERBAL PAIN SCALE (NPVS)
PAIN_FUNCTIONAL_ASSESSMENT: ACTIVITIES ARE NOT PREVENTED

## 2024-01-03 ASSESSMENT — PAIN DESCRIPTION - LOCATION
LOCATION: BACK

## 2024-01-03 ASSESSMENT — PAIN DESCRIPTION - FREQUENCY
FREQUENCY: CONTINUOUS

## 2024-01-03 ASSESSMENT — PAIN DESCRIPTION - PAIN TYPE
TYPE: ACUTE PAIN

## 2024-01-03 NOTE — ANESTHESIA PRE PROCEDURE
Department of Anesthesiology  Preprocedure Note       Name:  Jesus Walters   Age:  53 y.o.  :  1970                                          MRN:  [id number]         Date:  1/3/2024      Surgeon: Surgeon(s):  Luis Alberto Hines MD    Procedure: Procedure(s):  L1 Laminectomy and T10-L3 fusion with allograft and instrumentation    Medications prior to admission:   Prior to Admission medications    Medication Sig Start Date End Date Taking? Authorizing Provider   diclofenac sodium (VOLTAREN) 1 % GEL Apply 4 g topically 4 times daily as needed for Pain (LBP) 23  Yes ProviderFani MD   meloxicam (MOBIC) 15 MG tablet Take 1 tablet by mouth daily 23  Yes ProviderFani MD   acetaminophen (TYLENOL) 500 MG tablet Take 1 tablet by mouth every 6 hours as needed for Pain   Yes ProviderFani MD   cyclobenzaprine (FLEXERIL) 5 MG tablet Take 1-2 tablets by mouth nightly as needed for Muscle spasms 23   Emmanuel Sherman PA   lisinopril (PRINIVIL;ZESTRIL) 40 MG tablet Take 1 tablet by mouth daily    Automatic Reconciliation, Ar   nitroGLYCERIN (NITROSTAT) 0.4 MG SL tablet Place 1 tablet under the tongue  Patient not taking: Reported on 2024 5/26/15   Automatic Reconciliation, Ar       Current medications:    Current Facility-Administered Medications   Medication Dose Route Frequency Provider Last Rate Last Admin   • atorvastatin (LIPITOR) tablet 40 mg  40 mg Oral Daily Luis Alberto Hines MD   40 mg at 24 0853   • lisinopril (PRINIVIL;ZESTRIL) tablet 40 mg  40 mg Oral Daily Luis Alberto Hines MD       • oxyBUTYnin (DITROPAN-XL) extended release tablet 10 mg  10 mg Oral Daily Luis Alberto Hines MD       • sertraline (ZOLOFT) tablet 150 mg  150 mg Oral Daily Luis Alberto Hines MD   150 mg at 24 0853   • HYDROmorphone HCl PF (DILAUDID) injection 0.25 mg  0.25 mg IntraVENous Q3H PRN Luis Alberto Hines MD        Or   • HYDROmorphone HCl PF

## 2024-01-03 NOTE — ED NOTES
TRANSFER - OUT REPORT:    Verbal report given to Meño RICHMOND on Jesus Watlers  being transferred to [facility] for routine progression of patient care       Report consisted of patient's Situation, Background, Assessment and   Recommendations(SBAR).     Information from the following report(s) ED SBAR was reviewed with the receiving nurse.    Monae Fall Assessment:    Presents to emergency department  because of falls (Syncope, seizure, or loss of consciousness): No  Age > 70: No  Altered Mental Status, Intoxication with alcohol or substance confusion (Disorientation, impaired judgment, poor safety awaremess, or inability to follow instructions): No  Impaired Mobility: Ambulates or transfers with assistive devices or assistance; Unable to ambulate or transer.: Yes  Nursing Judgement: No          Lines:   Peripheral IV 01/02/24 Left Antecubital (Active)   Site Assessment Clean, dry & intact 01/02/24 2051   Line Status Blood return noted 01/02/24 2051        Opportunity for questions and clarification was provided.      Patient transported with:  Joe Espinosa RN  01/02/24 2052

## 2024-01-03 NOTE — OP NOTE
[facility] [address]   [phone]    OPERATIVE REPORT    Patient ID:Jesus Walters  [id number]  1970  53 y.o.    DATE OF SURGERY: 1/2/2024    SURGEON: Luis Alberto Proras MD    PREOP DIAGNOSIS:     1. L1 burst fracture with severe stenosis    POSTOP DIAGNOSIS:     1. L1 burst fracture with severe stenosis    PROCEDURE:  1. Posterolateral fusion L2-3, L1-2, T12-L1, T11-T12, T10-T11   (CPT 82067, 22614 X 4)  2. Right L1 hemilaminectomy.  (CPT 58120)  3. Pedicle screw instrumentation T10-L3. (CPT 53632)  4. Allograft (CPT 51266)     ANESTHESIA: General    ESTIMATED BLOOD LOSS:  300 ml    INTRAOPERATIVE COMPLICATIONS: None.    POSTOP CONDITION: Stable.    IMPLANTS:   Implant Name Type Inv. Item Serial No.  Lot No. LRB No. Used Action   PUTTY BIO DBM 5ML W CHIPS - [id number]  PUTTY BIO DBM 5ML W CHIPS  [employer] [facility] [id number] N/A 1 Implanted   ALLOGRAFT BNE CHIP 1-4 MM 30 CC CRUSH CAN - [id number]  ALLOGRAFT BNE CHIP 1-4 MM 30 CC CRUSH CAN [id number] [employer] [facility]  N/A 1 Implanted   GRAFT BNE XL - [id number]  GRAFT BNE XL [id number] [employer]  N/A 1 Implanted       INDICATIONS FOR PROCEDURE: Patient has had low back pain with radiation to the buttocks and lower extremities with saddle anesthesia and urinary retention for the last 2 weeks. The symptoms and exam findings were felt to be consistent with lumbar stenosis. The preoperative radiographs and other imaging confirmed showed an L1 burst fracture resulting in severe stenosis.  He was placed in a TLSO.  The symptoms progressed to the point where there is difficulty performing any ADLs.  Patient lives alone. The risks, benefits and potential complications of the above-listed procedure were discussed with her and an informed consent was obtained.     DESCRIPTION OF PROCEDURE: After adequate induction of general anesthesia, the patient was positioned prone on

## 2024-01-03 NOTE — PERIOP NOTE
Spoke with nursing staff to inform of surgery time tomorrow of 1300 with arrival to pre-op of 1100.

## 2024-01-03 NOTE — H&P
Name: Jesus Walters  YOB: 1970  Gender: male  MRN: [id number]  Age: 53 y.o.      Chief Complaint:  Radiating back and leg pain     History of Present Illness:      The patient has a nearly 2 week history of intractable back pain with radiation to both legs and the groin. The symptoms started after he lost power in his house and homer himself walking in the dark.  Patient has been th to [facility] ED twice and was placed stanley TLSO and referred to spine surgery at [facility]. He was not able to make follow up over the Holidays and his symptoms progressed. He lives alone and has not been able to take care of himself. His pain is not controlled. He has developed urinary retention and now has groin numbness.  New MRI obtained in ER last night.        Medications:       Current Facility-Administered Medications:     atorvastatin (LIPITOR) tablet 40 mg, 40 mg, Oral, Daily, Luis Alberto Hines MD    lisinopril (PRINIVIL;ZESTRIL) tablet 40 mg, 40 mg, Oral, Daily, Luis Alberto Hines MD    oxyBUTYnin (DITROPAN-XL) extended release tablet 10 mg, 10 mg, Oral, Daily, Luis Alberto Hines MD    sertraline (ZOLOFT) tablet 150 mg, 150 mg, Oral, Daily, Luis Alberto Hines MD    HYDROmorphone HCl PF (DILAUDID) injection 0.25 mg, 0.25 mg, IntraVENous, Q3H PRN **OR** HYDROmorphone HCl PF (DILAUDID) injection 0.5 mg, 0.5 mg, IntraVENous, Q3H PRN, Luis Alberto Hines MD, 0.5 mg at 01/03/24 0559    oxyCODONE-acetaminophen (PERCOCET) 5-325 MG per tablet 1 tablet, 1 tablet, Oral, Q4H PRN **OR** oxyCODONE-acetaminophen (PERCOCET) 5-325 MG per tablet 2 tablet, 2 tablet, Oral, Q4H PRN, Luis Alberto Hines MD    ondansetron (ZOFRAN) injection 4 mg, 4 mg, IntraVENous, Q6H PRN, Luis Alberto Hines MD    promethazine (PHENERGAN) tablet 25 mg, 25 mg, Oral, Q6H PRN, Luis Alberto Hines MD    docusate sodium (COLACE) capsule 100 mg, 100 mg, Oral, Daily, Luis Alberto Hines MD    polyethylene

## 2024-01-04 PROCEDURE — 97165 OT EVAL LOW COMPLEX 30 MIN: CPT

## 2024-01-04 PROCEDURE — 6370000000 HC RX 637 (ALT 250 FOR IP): Performed by: ORTHOPAEDIC SURGERY

## 2024-01-04 PROCEDURE — 6360000002 HC RX W HCPCS: Performed by: ORTHOPAEDIC SURGERY

## 2024-01-04 PROCEDURE — 97535 SELF CARE MNGMENT TRAINING: CPT

## 2024-01-04 PROCEDURE — 2580000003 HC RX 258: Performed by: ORTHOPAEDIC SURGERY

## 2024-01-04 PROCEDURE — 97112 NEUROMUSCULAR REEDUCATION: CPT

## 2024-01-04 PROCEDURE — 2500000003 HC RX 250 WO HCPCS: Performed by: ORTHOPAEDIC SURGERY

## 2024-01-04 PROCEDURE — 97161 PT EVAL LOW COMPLEX 20 MIN: CPT

## 2024-01-04 PROCEDURE — 97530 THERAPEUTIC ACTIVITIES: CPT

## 2024-01-04 PROCEDURE — 1100000000 HC RM PRIVATE

## 2024-01-04 RX ADMIN — OXYCODONE HYDROCHLORIDE 10 MG: 5 TABLET ORAL at 10:21

## 2024-01-04 RX ADMIN — ACETAMINOPHEN 650 MG: 325 TABLET ORAL at 03:52

## 2024-01-04 RX ADMIN — DOCUSATE SODIUM 100 MG: 100 CAPSULE, LIQUID FILLED ORAL at 09:51

## 2024-01-04 RX ADMIN — TUBERCULIN PURIFIED PROTEIN DERIVATIVE 5 UNITS: 5 INJECTION, SOLUTION INTRADERMAL at 17:07

## 2024-01-04 RX ADMIN — ATORVASTATIN CALCIUM 40 MG: 40 TABLET, FILM COATED ORAL at 09:52

## 2024-01-04 RX ADMIN — HYDROMORPHONE HYDROCHLORIDE 0.5 MG: 1 INJECTION, SOLUTION INTRAMUSCULAR; INTRAVENOUS; SUBCUTANEOUS at 12:17

## 2024-01-04 RX ADMIN — OXYCODONE HYDROCHLORIDE 10 MG: 5 TABLET ORAL at 17:11

## 2024-01-04 RX ADMIN — CYCLOBENZAPRINE HYDROCHLORIDE 10 MG: 5 TABLET, FILM COATED ORAL at 21:18

## 2024-01-04 RX ADMIN — CEFAZOLIN SODIUM 2000 MG: 100 INJECTION, POWDER, LYOPHILIZED, FOR SOLUTION INTRAVENOUS at 03:54

## 2024-01-04 RX ADMIN — HYDROMORPHONE HYDROCHLORIDE 0.5 MG: 1 INJECTION, SOLUTION INTRAMUSCULAR; INTRAVENOUS; SUBCUTANEOUS at 21:19

## 2024-01-04 RX ADMIN — ACETAMINOPHEN 650 MG: 325 TABLET ORAL at 10:21

## 2024-01-04 RX ADMIN — BISACODYL 5 MG: 5 TABLET, COATED ORAL at 09:52

## 2024-01-04 RX ADMIN — LISINOPRIL 40 MG: 20 TABLET ORAL at 09:52

## 2024-01-04 RX ADMIN — SODIUM CHLORIDE, PRESERVATIVE FREE 10 ML: 5 INJECTION INTRAVENOUS at 21:18

## 2024-01-04 RX ADMIN — ACETAMINOPHEN 650 MG: 325 TABLET ORAL at 21:19

## 2024-01-04 RX ADMIN — ACETAMINOPHEN 650 MG: 325 TABLET ORAL at 17:07

## 2024-01-04 RX ADMIN — SODIUM CHLORIDE, PRESERVATIVE FREE 10 ML: 5 INJECTION INTRAVENOUS at 09:55

## 2024-01-04 RX ADMIN — KETOROLAC TROMETHAMINE 15 MG: 30 INJECTION, SOLUTION INTRAMUSCULAR; INTRAVENOUS at 03:53

## 2024-01-04 ASSESSMENT — PAIN DESCRIPTION - ONSET
ONSET: ON-GOING
ONSET: PROGRESSIVE
ONSET: GRADUAL
ONSET: ON-GOING

## 2024-01-04 ASSESSMENT — PAIN DESCRIPTION - LOCATION
LOCATION: BACK

## 2024-01-04 ASSESSMENT — PAIN DESCRIPTION - DESCRIPTORS
DESCRIPTORS: ACHING;DISCOMFORT;POUNDING
DESCRIPTORS: ACHING;DISCOMFORT
DESCRIPTORS: SHARP;SPASM;THROBBING

## 2024-01-04 ASSESSMENT — PAIN DESCRIPTION - ORIENTATION
ORIENTATION: MID;UPPER;LOWER;LEFT;RIGHT
ORIENTATION: POSTERIOR
ORIENTATION: RIGHT;LEFT
ORIENTATION: POSTERIOR

## 2024-01-04 ASSESSMENT — PAIN SCALES - GENERAL
PAINLEVEL_OUTOF10: 8
PAINLEVEL_OUTOF10: 2
PAINLEVEL_OUTOF10: 2
PAINLEVEL_OUTOF10: 0
PAINLEVEL_OUTOF10: 0
PAINLEVEL_OUTOF10: 9
PAINLEVEL_OUTOF10: 0
PAINLEVEL_OUTOF10: 10
PAINLEVEL_OUTOF10: 5
PAINLEVEL_OUTOF10: 9
PAINLEVEL_OUTOF10: 2

## 2024-01-04 ASSESSMENT — PAIN SCALES - WONG BAKER
WONGBAKER_NUMERICALRESPONSE: 2
WONGBAKER_NUMERICALRESPONSE: 2
WONGBAKER_NUMERICALRESPONSE: 0
WONGBAKER_NUMERICALRESPONSE: 2

## 2024-01-04 ASSESSMENT — PAIN DESCRIPTION - FREQUENCY
FREQUENCY: CONTINUOUS
FREQUENCY: INTERMITTENT
FREQUENCY: CONTINUOUS
FREQUENCY: CONTINUOUS

## 2024-01-04 ASSESSMENT — PAIN DESCRIPTION - PAIN TYPE
TYPE: ACUTE PAIN
TYPE: SURGICAL PAIN
TYPE: SURGICAL PAIN
TYPE: CHRONIC PAIN

## 2024-01-04 ASSESSMENT — PAIN - FUNCTIONAL ASSESSMENT
PAIN_FUNCTIONAL_ASSESSMENT: PREVENTS OR INTERFERES SOME ACTIVE ACTIVITIES AND ADLS
PAIN_FUNCTIONAL_ASSESSMENT: ACTIVITIES ARE NOT PREVENTED
PAIN_FUNCTIONAL_ASSESSMENT: PREVENTS OR INTERFERES SOME ACTIVE ACTIVITIES AND ADLS

## 2024-01-04 NOTE — ANESTHESIA POSTPROCEDURE EVALUATION
Department of Anesthesiology  Postprocedure Note    Patient: Jesus Walters  MRN: [id number]  YOB: 1970  Date of evaluation: 1/4/2024    Procedure Summary       Date: 01/03/24 Room / Location: [facility] MAIN OR  / [facility] MAIN OR    Anesthesia Start: 1156 Anesthesia Stop: 1451    Procedure: L1 Laminectomy and T10-L3 fusion with allograft and instrumentation (Spine Lumbar) Diagnosis:       Lumbar burst fracture (HCC)      (Lumbar burst fracture (HCC) [S32.001A])    Providers: Luis Alberto Hines MD Responsible Provider: Wai Frost Jr., MD    Anesthesia Type: General ASA Status: 3            Anesthesia Type: General    Cassandra Phase I: Cassandra Score: 9    Cassandra Phase II:      Anesthesia Post Evaluation    Patient location during evaluation: PACU  Patient participation: complete - patient participated  Level of consciousness: awake  Pain score: 2  Airway patency: patent  Nausea & Vomiting: no nausea and no vomiting  Cardiovascular status: blood pressure returned to baseline and hemodynamically stable  Respiratory status: acceptable, spontaneous ventilation and nonlabored ventilation  Hydration status: euvolemic  Comments: /85   Pulse 76   Temp 97.5 °F (36.4 °C) (Oral)   Resp 18   Ht 1.803 m (5' 11\")   Wt 84.4 kg (186 lb)   SpO2 96%   BMI 25.94 kg/m²   Multimodal analgesia pain management approach  Pain management: adequate    No notable events documented.

## 2024-01-05 LAB
MM INDURATION, POC: 0 MM (ref 0–5)
PPD, POC: NEGATIVE

## 2024-01-05 PROCEDURE — 2500000003 HC RX 250 WO HCPCS: Performed by: ORTHOPAEDIC SURGERY

## 2024-01-05 PROCEDURE — 2580000003 HC RX 258: Performed by: ORTHOPAEDIC SURGERY

## 2024-01-05 PROCEDURE — 6370000000 HC RX 637 (ALT 250 FOR IP): Performed by: ORTHOPAEDIC SURGERY

## 2024-01-05 PROCEDURE — 97530 THERAPEUTIC ACTIVITIES: CPT

## 2024-01-05 PROCEDURE — 1100000000 HC RM PRIVATE

## 2024-01-05 RX ADMIN — DOCUSATE SODIUM 100 MG: 100 CAPSULE, LIQUID FILLED ORAL at 09:07

## 2024-01-05 RX ADMIN — ACETAMINOPHEN 650 MG: 325 TABLET ORAL at 11:05

## 2024-01-05 RX ADMIN — ATORVASTATIN CALCIUM 40 MG: 40 TABLET, FILM COATED ORAL at 09:08

## 2024-01-05 RX ADMIN — ACETAMINOPHEN 650 MG: 325 TABLET ORAL at 15:59

## 2024-01-05 RX ADMIN — POLYETHYLENE GLYCOL 3350 17 G: 17 POWDER, FOR SOLUTION ORAL at 09:08

## 2024-01-05 RX ADMIN — SODIUM CHLORIDE, PRESERVATIVE FREE 10 ML: 5 INJECTION INTRAVENOUS at 09:08

## 2024-01-05 RX ADMIN — BISACODYL 5 MG: 5 TABLET, COATED ORAL at 09:07

## 2024-01-05 RX ADMIN — ACETAMINOPHEN 650 MG: 325 TABLET ORAL at 04:29

## 2024-01-05 RX ADMIN — LISINOPRIL 40 MG: 20 TABLET ORAL at 09:08

## 2024-01-05 RX ADMIN — HYDROMORPHONE HYDROCHLORIDE 0.5 MG: 1 INJECTION, SOLUTION INTRAMUSCULAR; INTRAVENOUS; SUBCUTANEOUS at 09:05

## 2024-01-05 RX ADMIN — OXYCODONE HYDROCHLORIDE 10 MG: 5 TABLET ORAL at 15:59

## 2024-01-05 RX ADMIN — OXYCODONE HYDROCHLORIDE 10 MG: 5 TABLET ORAL at 04:29

## 2024-01-05 RX ADMIN — ACETAMINOPHEN 650 MG: 325 TABLET ORAL at 22:22

## 2024-01-05 ASSESSMENT — PAIN DESCRIPTION - ONSET
ONSET: PROGRESSIVE

## 2024-01-05 ASSESSMENT — PAIN SCALES - WONG BAKER: WONGBAKER_NUMERICALRESPONSE: 0

## 2024-01-05 ASSESSMENT — PAIN SCALES - GENERAL
PAINLEVEL_OUTOF10: 2
PAINLEVEL_OUTOF10: 0
PAINLEVEL_OUTOF10: 8
PAINLEVEL_OUTOF10: 0
PAINLEVEL_OUTOF10: 8
PAINLEVEL_OUTOF10: 10
PAINLEVEL_OUTOF10: 2

## 2024-01-05 ASSESSMENT — PAIN DESCRIPTION - ORIENTATION
ORIENTATION: MID
ORIENTATION: MID
ORIENTATION: MID;RIGHT;LEFT;UPPER

## 2024-01-05 ASSESSMENT — PAIN DESCRIPTION - LOCATION
LOCATION: BACK

## 2024-01-05 ASSESSMENT — PAIN DESCRIPTION - FREQUENCY
FREQUENCY: INTERMITTENT

## 2024-01-05 ASSESSMENT — PAIN DESCRIPTION - PAIN TYPE
TYPE: SURGICAL PAIN
TYPE: ACUTE PAIN
TYPE: SURGICAL PAIN

## 2024-01-05 ASSESSMENT — PAIN DESCRIPTION - DESCRIPTORS
DESCRIPTORS: ACHING;POUNDING;DISCOMFORT
DESCRIPTORS: ACHING;DISCOMFORT;POUNDING

## 2024-01-05 ASSESSMENT — PAIN - FUNCTIONAL ASSESSMENT
PAIN_FUNCTIONAL_ASSESSMENT: PREVENTS OR INTERFERES SOME ACTIVE ACTIVITIES AND ADLS
PAIN_FUNCTIONAL_ASSESSMENT: PREVENTS OR INTERFERES SOME ACTIVE ACTIVITIES AND ADLS
PAIN_FUNCTIONAL_ASSESSMENT: PREVENTS OR INTERFERES WITH MANY ACTIVE NOT PASSIVE ACTIVITIES

## 2024-01-06 LAB
MM INDURATION, POC: 0 MM (ref 0–5)
PPD, POC: NEGATIVE

## 2024-01-06 PROCEDURE — 97530 THERAPEUTIC ACTIVITIES: CPT

## 2024-01-06 PROCEDURE — 2500000003 HC RX 250 WO HCPCS: Performed by: ORTHOPAEDIC SURGERY

## 2024-01-06 PROCEDURE — 6370000000 HC RX 637 (ALT 250 FOR IP): Performed by: ORTHOPAEDIC SURGERY

## 2024-01-06 PROCEDURE — 1100000000 HC RM PRIVATE

## 2024-01-06 PROCEDURE — 2580000003 HC RX 258: Performed by: ORTHOPAEDIC SURGERY

## 2024-01-06 RX ADMIN — ACETAMINOPHEN 650 MG: 325 TABLET ORAL at 16:34

## 2024-01-06 RX ADMIN — SODIUM CHLORIDE, PRESERVATIVE FREE 10 ML: 5 INJECTION INTRAVENOUS at 08:48

## 2024-01-06 RX ADMIN — OXYCODONE HYDROCHLORIDE 10 MG: 5 TABLET ORAL at 04:20

## 2024-01-06 RX ADMIN — CYCLOBENZAPRINE HYDROCHLORIDE 10 MG: 5 TABLET, FILM COATED ORAL at 08:45

## 2024-01-06 RX ADMIN — ACETAMINOPHEN 650 MG: 325 TABLET ORAL at 22:07

## 2024-01-06 RX ADMIN — ATORVASTATIN CALCIUM 40 MG: 40 TABLET, FILM COATED ORAL at 08:45

## 2024-01-06 RX ADMIN — LISINOPRIL 40 MG: 20 TABLET ORAL at 08:44

## 2024-01-06 RX ADMIN — DOCUSATE SODIUM 100 MG: 100 CAPSULE, LIQUID FILLED ORAL at 08:43

## 2024-01-06 RX ADMIN — OXYCODONE HYDROCHLORIDE 10 MG: 5 TABLET ORAL at 16:34

## 2024-01-06 RX ADMIN — HYDROMORPHONE HYDROCHLORIDE 0.5 MG: 1 INJECTION, SOLUTION INTRAMUSCULAR; INTRAVENOUS; SUBCUTANEOUS at 10:22

## 2024-01-06 RX ADMIN — OXYCODONE HYDROCHLORIDE 10 MG: 5 TABLET ORAL at 08:46

## 2024-01-06 RX ADMIN — POLYETHYLENE GLYCOL 3350 17 G: 17 POWDER, FOR SOLUTION ORAL at 08:44

## 2024-01-06 RX ADMIN — ACETAMINOPHEN 650 MG: 325 TABLET ORAL at 04:20

## 2024-01-06 RX ADMIN — OXYCODONE HYDROCHLORIDE 10 MG: 5 TABLET ORAL at 22:05

## 2024-01-06 RX ADMIN — ACETAMINOPHEN 650 MG: 325 TABLET ORAL at 10:22

## 2024-01-06 RX ADMIN — SODIUM CHLORIDE, PRESERVATIVE FREE 10 ML: 5 INJECTION INTRAVENOUS at 22:07

## 2024-01-06 ASSESSMENT — PAIN DESCRIPTION - PAIN TYPE
TYPE: SURGICAL PAIN

## 2024-01-06 ASSESSMENT — PAIN SCALES - GENERAL
PAINLEVEL_OUTOF10: 8
PAINLEVEL_OUTOF10: 8
PAINLEVEL_OUTOF10: 0
PAINLEVEL_OUTOF10: 4
PAINLEVEL_OUTOF10: 3
PAINLEVEL_OUTOF10: 9
PAINLEVEL_OUTOF10: 9
PAINLEVEL_OUTOF10: 10
PAINLEVEL_OUTOF10: 3

## 2024-01-06 ASSESSMENT — PAIN DESCRIPTION - ORIENTATION
ORIENTATION: LEFT;RIGHT;MID
ORIENTATION: POSTERIOR
ORIENTATION: MID
ORIENTATION: POSTERIOR

## 2024-01-06 ASSESSMENT — PAIN DESCRIPTION - FREQUENCY
FREQUENCY: INTERMITTENT

## 2024-01-06 ASSESSMENT — PAIN DESCRIPTION - LOCATION
LOCATION: BACK
LOCATION: BACK;LEG
LOCATION: BACK
LOCATION: LEG;BACK
LOCATION: BACK

## 2024-01-06 ASSESSMENT — PAIN DESCRIPTION - DESCRIPTORS
DESCRIPTORS: STABBING
DESCRIPTORS: SHARP
DESCRIPTORS: STABBING
DESCRIPTORS: SHARP;ACHING

## 2024-01-06 ASSESSMENT — PAIN DESCRIPTION - ONSET
ONSET: GRADUAL

## 2024-01-06 ASSESSMENT — PAIN SCALES - WONG BAKER: WONGBAKER_NUMERICALRESPONSE: 0

## 2024-01-07 PROCEDURE — 1100000000 HC RM PRIVATE

## 2024-01-07 PROCEDURE — 2500000003 HC RX 250 WO HCPCS: Performed by: ORTHOPAEDIC SURGERY

## 2024-01-07 PROCEDURE — 2580000003 HC RX 258: Performed by: ORTHOPAEDIC SURGERY

## 2024-01-07 PROCEDURE — 97530 THERAPEUTIC ACTIVITIES: CPT

## 2024-01-07 PROCEDURE — 6370000000 HC RX 637 (ALT 250 FOR IP): Performed by: ORTHOPAEDIC SURGERY

## 2024-01-07 RX ADMIN — DOCUSATE SODIUM 100 MG: 100 CAPSULE, LIQUID FILLED ORAL at 08:10

## 2024-01-07 RX ADMIN — SODIUM CHLORIDE, PRESERVATIVE FREE 10 ML: 5 INJECTION INTRAVENOUS at 08:11

## 2024-01-07 RX ADMIN — ACETAMINOPHEN 650 MG: 325 TABLET ORAL at 10:37

## 2024-01-07 RX ADMIN — HYDROMORPHONE HYDROCHLORIDE 0.5 MG: 1 INJECTION, SOLUTION INTRAMUSCULAR; INTRAVENOUS; SUBCUTANEOUS at 21:34

## 2024-01-07 RX ADMIN — SODIUM CHLORIDE, PRESERVATIVE FREE 10 ML: 5 INJECTION INTRAVENOUS at 21:36

## 2024-01-07 RX ADMIN — CYCLOBENZAPRINE HYDROCHLORIDE 10 MG: 5 TABLET, FILM COATED ORAL at 08:10

## 2024-01-07 RX ADMIN — OXYCODONE HYDROCHLORIDE 10 MG: 5 TABLET ORAL at 18:31

## 2024-01-07 RX ADMIN — ACETAMINOPHEN 650 MG: 325 TABLET ORAL at 04:06

## 2024-01-07 RX ADMIN — ACETAMINOPHEN 650 MG: 325 TABLET ORAL at 21:40

## 2024-01-07 RX ADMIN — OXYCODONE HYDROCHLORIDE 10 MG: 5 TABLET ORAL at 10:37

## 2024-01-07 RX ADMIN — POLYETHYLENE GLYCOL 3350 17 G: 17 POWDER, FOR SOLUTION ORAL at 08:11

## 2024-01-07 RX ADMIN — ACETAMINOPHEN 650 MG: 325 TABLET ORAL at 16:23

## 2024-01-07 RX ADMIN — LISINOPRIL 40 MG: 20 TABLET ORAL at 08:10

## 2024-01-07 RX ADMIN — ATORVASTATIN CALCIUM 40 MG: 40 TABLET, FILM COATED ORAL at 08:10

## 2024-01-07 RX ADMIN — OXYCODONE HYDROCHLORIDE 10 MG: 5 TABLET ORAL at 04:06

## 2024-01-07 ASSESSMENT — PAIN DESCRIPTION - ORIENTATION
ORIENTATION: RIGHT;LEFT
ORIENTATION: POSTERIOR
ORIENTATION: POSTERIOR
ORIENTATION: MID

## 2024-01-07 ASSESSMENT — PAIN DESCRIPTION - FREQUENCY
FREQUENCY: INTERMITTENT
FREQUENCY: INTERMITTENT

## 2024-01-07 ASSESSMENT — PAIN DESCRIPTION - ONSET
ONSET: GRADUAL
ONSET: GRADUAL

## 2024-01-07 ASSESSMENT — PAIN DESCRIPTION - PAIN TYPE
TYPE: SURGICAL PAIN
TYPE: SURGICAL PAIN

## 2024-01-07 ASSESSMENT — PAIN SCALES - GENERAL
PAINLEVEL_OUTOF10: 9
PAINLEVEL_OUTOF10: 2
PAINLEVEL_OUTOF10: 8
PAINLEVEL_OUTOF10: 2
PAINLEVEL_OUTOF10: 8
PAINLEVEL_OUTOF10: 9
PAINLEVEL_OUTOF10: 2

## 2024-01-07 ASSESSMENT — PAIN DESCRIPTION - DESCRIPTORS
DESCRIPTORS: STABBING;SORE;TENDER
DESCRIPTORS: ACHING
DESCRIPTORS: ACHING;SORE;TENDER
DESCRIPTORS: ACHING

## 2024-01-07 ASSESSMENT — PAIN DESCRIPTION - LOCATION
LOCATION: BACK
LOCATION: BACK;LEG
LOCATION: BACK
LOCATION: BACK

## 2024-01-08 PROCEDURE — 2500000003 HC RX 250 WO HCPCS: Performed by: ORTHOPAEDIC SURGERY

## 2024-01-08 PROCEDURE — 6370000000 HC RX 637 (ALT 250 FOR IP): Performed by: ORTHOPAEDIC SURGERY

## 2024-01-08 PROCEDURE — 1100000000 HC RM PRIVATE

## 2024-01-08 PROCEDURE — 97530 THERAPEUTIC ACTIVITIES: CPT

## 2024-01-08 PROCEDURE — 2580000003 HC RX 258: Performed by: ORTHOPAEDIC SURGERY

## 2024-01-08 RX ORDER — DIMETHICONE, CAMPHOR (SYNTHETIC), MENTHOL, AND PHENOL 1.1; .5; .625; .5 G/100G; G/100G; G/100G; G/100G
OINTMENT TOPICAL PRN
Status: DISCONTINUED | OUTPATIENT
Start: 2024-01-08 | End: 2024-01-11 | Stop reason: HOSPADM

## 2024-01-08 RX ADMIN — SODIUM CHLORIDE, PRESERVATIVE FREE 10 ML: 5 INJECTION INTRAVENOUS at 21:18

## 2024-01-08 RX ADMIN — ATORVASTATIN CALCIUM 40 MG: 40 TABLET, FILM COATED ORAL at 09:03

## 2024-01-08 RX ADMIN — POLYETHYLENE GLYCOL 3350 17 G: 17 POWDER, FOR SOLUTION ORAL at 09:02

## 2024-01-08 RX ADMIN — DOCUSATE SODIUM 100 MG: 100 CAPSULE, LIQUID FILLED ORAL at 09:03

## 2024-01-08 RX ADMIN — SODIUM CHLORIDE, PRESERVATIVE FREE 10 ML: 5 INJECTION INTRAVENOUS at 09:08

## 2024-01-08 RX ADMIN — HYDROMORPHONE HYDROCHLORIDE 0.5 MG: 1 INJECTION, SOLUTION INTRAMUSCULAR; INTRAVENOUS; SUBCUTANEOUS at 21:19

## 2024-01-08 RX ADMIN — BISACODYL 5 MG: 5 TABLET, COATED ORAL at 09:03

## 2024-01-08 RX ADMIN — ACETAMINOPHEN 650 MG: 325 TABLET ORAL at 21:19

## 2024-01-08 RX ADMIN — HYDROMORPHONE HYDROCHLORIDE 0.5 MG: 1 INJECTION, SOLUTION INTRAMUSCULAR; INTRAVENOUS; SUBCUTANEOUS at 16:52

## 2024-01-08 RX ADMIN — HYDROMORPHONE HYDROCHLORIDE 0.5 MG: 1 INJECTION, SOLUTION INTRAMUSCULAR; INTRAVENOUS; SUBCUTANEOUS at 09:18

## 2024-01-08 RX ADMIN — LISINOPRIL 40 MG: 20 TABLET ORAL at 09:03

## 2024-01-08 RX ADMIN — ACETAMINOPHEN 650 MG: 325 TABLET ORAL at 09:03

## 2024-01-08 RX ADMIN — ACETAMINOPHEN 650 MG: 325 TABLET ORAL at 16:46

## 2024-01-08 RX ADMIN — HYDROMORPHONE HYDROCHLORIDE 0.5 MG: 1 INJECTION, SOLUTION INTRAMUSCULAR; INTRAVENOUS; SUBCUTANEOUS at 04:25

## 2024-01-08 RX ADMIN — SERTRALINE HYDROCHLORIDE 150 MG: 50 TABLET ORAL at 09:03

## 2024-01-08 RX ADMIN — ACETAMINOPHEN 650 MG: 325 TABLET ORAL at 04:26

## 2024-01-08 RX ADMIN — OXYCODONE HYDROCHLORIDE 5 MG: 5 TABLET ORAL at 12:32

## 2024-01-08 ASSESSMENT — PAIN DESCRIPTION - ORIENTATION
ORIENTATION: POSTERIOR
ORIENTATION: POSTERIOR
ORIENTATION: MID
ORIENTATION: POSTERIOR
ORIENTATION: LOWER

## 2024-01-08 ASSESSMENT — PAIN DESCRIPTION - LOCATION
LOCATION: BACK

## 2024-01-08 ASSESSMENT — PAIN DESCRIPTION - DESCRIPTORS
DESCRIPTORS: ACHING;THROBBING
DESCRIPTORS: ACHING;THROBBING;SORE
DESCRIPTORS: SHARP
DESCRIPTORS: ACHING;THROBBING
DESCRIPTORS: ACHING

## 2024-01-08 ASSESSMENT — PAIN SCALES - GENERAL
PAINLEVEL_OUTOF10: 6
PAINLEVEL_OUTOF10: 0
PAINLEVEL_OUTOF10: 8
PAINLEVEL_OUTOF10: 5
PAINLEVEL_OUTOF10: 8
PAINLEVEL_OUTOF10: 9
PAINLEVEL_OUTOF10: 0
PAINLEVEL_OUTOF10: 10
PAINLEVEL_OUTOF10: 0

## 2024-01-08 ASSESSMENT — PAIN DESCRIPTION - DIRECTION: RADIATING_TOWARDS: RIGHT HIP

## 2024-01-08 ASSESSMENT — PAIN SCALES - WONG BAKER
WONGBAKER_NUMERICALRESPONSE: 0
WONGBAKER_NUMERICALRESPONSE: 8
WONGBAKER_NUMERICALRESPONSE: 0
WONGBAKER_NUMERICALRESPONSE: 0

## 2024-01-08 ASSESSMENT — PAIN - FUNCTIONAL ASSESSMENT: PAIN_FUNCTIONAL_ASSESSMENT: PREVENTS OR INTERFERES SOME ACTIVE ACTIVITIES AND ADLS

## 2024-01-08 ASSESSMENT — PAIN DESCRIPTION - ONSET: ONSET: ON-GOING

## 2024-01-08 ASSESSMENT — PAIN DESCRIPTION - FREQUENCY: FREQUENCY: CONTINUOUS

## 2024-01-08 ASSESSMENT — PAIN DESCRIPTION - PAIN TYPE: TYPE: CHRONIC PAIN

## 2024-01-09 PROCEDURE — 1100000000 HC RM PRIVATE

## 2024-01-09 PROCEDURE — 97530 THERAPEUTIC ACTIVITIES: CPT

## 2024-01-09 PROCEDURE — 2500000003 HC RX 250 WO HCPCS: Performed by: ORTHOPAEDIC SURGERY

## 2024-01-09 PROCEDURE — 6370000000 HC RX 637 (ALT 250 FOR IP): Performed by: ORTHOPAEDIC SURGERY

## 2024-01-09 PROCEDURE — 97535 SELF CARE MNGMENT TRAINING: CPT

## 2024-01-09 PROCEDURE — 2580000003 HC RX 258: Performed by: ORTHOPAEDIC SURGERY

## 2024-01-09 RX ADMIN — SODIUM CHLORIDE, PRESERVATIVE FREE 5 ML: 5 INJECTION INTRAVENOUS at 09:43

## 2024-01-09 RX ADMIN — ACETAMINOPHEN 650 MG: 325 TABLET ORAL at 15:12

## 2024-01-09 RX ADMIN — LISINOPRIL 40 MG: 20 TABLET ORAL at 09:32

## 2024-01-09 RX ADMIN — OXYCODONE HYDROCHLORIDE 10 MG: 5 TABLET ORAL at 04:38

## 2024-01-09 RX ADMIN — SODIUM CHLORIDE, PRESERVATIVE FREE 10 ML: 5 INJECTION INTRAVENOUS at 20:51

## 2024-01-09 RX ADMIN — DOCUSATE SODIUM 100 MG: 100 CAPSULE, LIQUID FILLED ORAL at 09:32

## 2024-01-09 RX ADMIN — ATORVASTATIN CALCIUM 40 MG: 40 TABLET, FILM COATED ORAL at 09:32

## 2024-01-09 RX ADMIN — ACETAMINOPHEN 650 MG: 325 TABLET ORAL at 09:43

## 2024-01-09 RX ADMIN — BISACODYL 5 MG: 5 TABLET, COATED ORAL at 09:32

## 2024-01-09 RX ADMIN — POLYETHYLENE GLYCOL 3350 17 G: 17 POWDER, FOR SOLUTION ORAL at 09:32

## 2024-01-09 RX ADMIN — OXYCODONE HYDROCHLORIDE 10 MG: 5 TABLET ORAL at 13:55

## 2024-01-09 RX ADMIN — ACETAMINOPHEN 650 MG: 325 TABLET ORAL at 20:51

## 2024-01-09 RX ADMIN — ACETAMINOPHEN 650 MG: 325 TABLET ORAL at 04:39

## 2024-01-09 RX ADMIN — OXYCODONE HYDROCHLORIDE 10 MG: 5 TABLET ORAL at 20:52

## 2024-01-09 RX ADMIN — HYDROMORPHONE HYDROCHLORIDE 0.5 MG: 1 INJECTION, SOLUTION INTRAMUSCULAR; INTRAVENOUS; SUBCUTANEOUS at 09:42

## 2024-01-09 RX ADMIN — SERTRALINE HYDROCHLORIDE 150 MG: 50 TABLET ORAL at 09:32

## 2024-01-09 ASSESSMENT — PAIN SCALES - GENERAL
PAINLEVEL_OUTOF10: 0
PAINLEVEL_OUTOF10: 8
PAINLEVEL_OUTOF10: 4
PAINLEVEL_OUTOF10: 8
PAINLEVEL_OUTOF10: 1
PAINLEVEL_OUTOF10: 0
PAINLEVEL_OUTOF10: 8
PAINLEVEL_OUTOF10: 0
PAINLEVEL_OUTOF10: 6
PAINLEVEL_OUTOF10: 6

## 2024-01-09 ASSESSMENT — PAIN DESCRIPTION - DIRECTION
RADIATING_TOWARDS: RIGHT HIP
RADIATING_TOWARDS: RIGHT HIP

## 2024-01-09 ASSESSMENT — PAIN DESCRIPTION - ORIENTATION
ORIENTATION: POSTERIOR
ORIENTATION: POSTERIOR
ORIENTATION: LOWER
ORIENTATION: POSTERIOR

## 2024-01-09 ASSESSMENT — PAIN DESCRIPTION - DESCRIPTORS
DESCRIPTORS: SHARP
DESCRIPTORS: SHARP
DESCRIPTORS: ACHING;THROBBING;SORE
DESCRIPTORS: ACHING;THROBBING

## 2024-01-09 ASSESSMENT — PAIN - FUNCTIONAL ASSESSMENT
PAIN_FUNCTIONAL_ASSESSMENT: PREVENTS OR INTERFERES SOME ACTIVE ACTIVITIES AND ADLS
PAIN_FUNCTIONAL_ASSESSMENT: PREVENTS OR INTERFERES SOME ACTIVE ACTIVITIES AND ADLS

## 2024-01-09 ASSESSMENT — PAIN DESCRIPTION - LOCATION
LOCATION: BACK

## 2024-01-09 ASSESSMENT — PAIN DESCRIPTION - FREQUENCY
FREQUENCY: CONTINUOUS
FREQUENCY: CONTINUOUS

## 2024-01-09 ASSESSMENT — PAIN SCALES - WONG BAKER
WONGBAKER_NUMERICALRESPONSE: 0
WONGBAKER_NUMERICALRESPONSE: 0

## 2024-01-09 ASSESSMENT — PAIN DESCRIPTION - PAIN TYPE: TYPE: ACUTE PAIN

## 2024-01-09 ASSESSMENT — PAIN DESCRIPTION - ONSET
ONSET: ON-GOING
ONSET: ON-GOING

## 2024-01-09 NOTE — ADT AUTH CERT
Utilization Reviews       1/07   Last Updated by Emily Desir RN on 1/8/2024 1436     Review Status Created By   In Primary Emily Desir RN       Review Type Associated Date   -- 1/8/2024      Criteria Review   DATE: 1/7/2024  Acute/ Orthopedics        PERTINENT UPDATES:  -Pt still with back pain  IV prn pain med given     VITALS:  T: 98.2 F- 100.9 F  WY: 84  BP: 128/80  RR: 20  spO2: 97% at RA  pain scale: 9,2,8 (Back;Leg)     Total Intake: 460 ml (PO)  Total Output: 800 ml (Urine)        PHYSICAL EXAM:  No significant neurologic changes        MD CONSULTS/ASSESSMENT AND PLAN:  Ortho:  Lumbar burst fracture, closed, initial encounter  Plan of Care:   1: WB status - WBAT  2: DVT px - SCD's or Sequential Compression Device  3: ABX - Ancef  4: Continue mobilization as tolerated.        MEDICATIONS:  Tylenol 650mg PO q 6 hours  Lipitor 40mg PO Daily  Colace 100mg PO Daily  Glycolax 17g PO Daily  Flexeril 10mg tid PRN PO x1  Dilaudid 0.5mg IV q 3 hours PRN x 1  Roxicodone 10mg PO q 4 hours PRN x 3        PT/OT/SLP/CM ASSESSMENT OR NOTES:  PT Notes:  Patient performed supine to sit with SBA but with additional time, and cues for log rolling technique. Donned TLSO brace EOB with max assist and cues for technique. Patient transferred to standing with min assist and cues for hand placement. Once standing patient ambulated slowly for 100' x 2 with rolling walker, CGA and cues for sequencing/posture/walker utilization. Patient took a rest break during ambulation bouts. Patient returned to recliner chair where he was instructed in LE exercises to BLE's with cues for improved quality of exercise. Overall slow, steady progress towards PT goals. Will continue PT efforts.     -Distance: 100' x 2  DME: Rolling Walker  Gait Quality: Decreased rikki , Decreased step length, Narrow base of support, and Trunk sway increased  FREQUENCY AND DURATION: BID for duration of hospital stay or until stated goals are met, whichever

## 2024-01-10 LAB
SARS-COV-2 RDRP RESP QL NAA+PROBE: NOT DETECTED
SOURCE: NORMAL

## 2024-01-10 PROCEDURE — 6370000000 HC RX 637 (ALT 250 FOR IP): Performed by: ORTHOPAEDIC SURGERY

## 2024-01-10 PROCEDURE — 97110 THERAPEUTIC EXERCISES: CPT

## 2024-01-10 PROCEDURE — 97530 THERAPEUTIC ACTIVITIES: CPT

## 2024-01-10 PROCEDURE — 87635 SARS-COV-2 COVID-19 AMP PRB: CPT

## 2024-01-10 PROCEDURE — 2500000003 HC RX 250 WO HCPCS: Performed by: ORTHOPAEDIC SURGERY

## 2024-01-10 PROCEDURE — 1100000000 HC RM PRIVATE

## 2024-01-10 PROCEDURE — 2580000003 HC RX 258: Performed by: ORTHOPAEDIC SURGERY

## 2024-01-10 RX ORDER — CYCLOBENZAPRINE HCL 5 MG
5 TABLET ORAL 3 TIMES DAILY PRN
Qty: 30 TABLET | Refills: 0 | Status: SHIPPED | OUTPATIENT
Start: 2024-01-10

## 2024-01-10 RX ORDER — CYCLOBENZAPRINE HCL 5 MG
5 TABLET ORAL 3 TIMES DAILY PRN
Qty: 30 TABLET | Refills: 0 | Status: SHIPPED | OUTPATIENT
Start: 2024-01-10 | End: 2024-01-10

## 2024-01-10 RX ADMIN — ACETAMINOPHEN 650 MG: 325 TABLET ORAL at 04:26

## 2024-01-10 RX ADMIN — SERTRALINE HYDROCHLORIDE 150 MG: 50 TABLET ORAL at 10:22

## 2024-01-10 RX ADMIN — DOCUSATE SODIUM 100 MG: 100 CAPSULE, LIQUID FILLED ORAL at 10:22

## 2024-01-10 RX ADMIN — HYDROMORPHONE HYDROCHLORIDE 0.5 MG: 1 INJECTION, SOLUTION INTRAMUSCULAR; INTRAVENOUS; SUBCUTANEOUS at 10:23

## 2024-01-10 RX ADMIN — ACETAMINOPHEN 650 MG: 325 TABLET ORAL at 21:18

## 2024-01-10 RX ADMIN — OXYCODONE HYDROCHLORIDE 10 MG: 5 TABLET ORAL at 16:01

## 2024-01-10 RX ADMIN — OXYCODONE HYDROCHLORIDE 10 MG: 5 TABLET ORAL at 04:26

## 2024-01-10 RX ADMIN — OXYCODONE HYDROCHLORIDE 10 MG: 5 TABLET ORAL at 21:18

## 2024-01-10 RX ADMIN — ACETAMINOPHEN 650 MG: 325 TABLET ORAL at 10:22

## 2024-01-10 RX ADMIN — POLYETHYLENE GLYCOL 3350 17 G: 17 POWDER, FOR SOLUTION ORAL at 10:22

## 2024-01-10 RX ADMIN — LISINOPRIL 40 MG: 20 TABLET ORAL at 10:22

## 2024-01-10 RX ADMIN — ATORVASTATIN CALCIUM 40 MG: 40 TABLET, FILM COATED ORAL at 10:22

## 2024-01-10 RX ADMIN — BISACODYL 5 MG: 5 TABLET, COATED ORAL at 10:22

## 2024-01-10 RX ADMIN — SODIUM CHLORIDE, PRESERVATIVE FREE 10 ML: 5 INJECTION INTRAVENOUS at 21:29

## 2024-01-10 RX ADMIN — SODIUM CHLORIDE, PRESERVATIVE FREE 5 ML: 5 INJECTION INTRAVENOUS at 10:23

## 2024-01-10 ASSESSMENT — PAIN DESCRIPTION - PAIN TYPE: TYPE: SURGICAL PAIN

## 2024-01-10 ASSESSMENT — PAIN DESCRIPTION - DESCRIPTORS
DESCRIPTORS: STABBING
DESCRIPTORS: ACHING;THROBBING;SORE
DESCRIPTORS: SHARP
DESCRIPTORS: ACHING;SORE

## 2024-01-10 ASSESSMENT — PAIN DESCRIPTION - LOCATION
LOCATION: BACK

## 2024-01-10 ASSESSMENT — PAIN DESCRIPTION - DIRECTION: RADIATING_TOWARDS: RIGHT HIP

## 2024-01-10 ASSESSMENT — PAIN DESCRIPTION - ORIENTATION
ORIENTATION: POSTERIOR
ORIENTATION: LOWER
ORIENTATION: POSTERIOR
ORIENTATION: LOWER
ORIENTATION: LOWER

## 2024-01-10 ASSESSMENT — PAIN SCALES - GENERAL
PAINLEVEL_OUTOF10: 8
PAINLEVEL_OUTOF10: 7
PAINLEVEL_OUTOF10: 3
PAINLEVEL_OUTOF10: 2
PAINLEVEL_OUTOF10: 8
PAINLEVEL_OUTOF10: 3
PAINLEVEL_OUTOF10: 7
PAINLEVEL_OUTOF10: 2

## 2024-01-10 ASSESSMENT — PAIN DESCRIPTION - FREQUENCY
FREQUENCY: CONTINUOUS
FREQUENCY: CONTINUOUS

## 2024-01-10 ASSESSMENT — PAIN DESCRIPTION - ONSET
ONSET: ON-GOING
ONSET: ON-GOING

## 2024-01-10 NOTE — DISCHARGE SUMMARY
Discharge Summary    Patient ID:Jesus Walters  [id number]  1970  53 y.o.    Admit date: 1/2/2024    Discharge date:1/11/2024    Admitting Physician: Luis Alberto Hines, *    DATE OF SURGERY: 1/2/2024    SURGEON: Luis Alberto Hines MD     PREOP DIAGNOSIS:      1. L1 burst fracture with severe stenosis    POSTOP DIAGNOSIS:      1. L1 burst fracture with severe stenosis    PROCEDURE:  1. Posterolateral fusion L2-3, L1-2, T12-L1, T11-T12, T10-T11   (CPT 61201, 22614 X 4)  2. Right L1 hemilaminectomy.  (CPT 94037)  3. Pedicle screw instrumentation T10-L3. (CPT 93280)  4. Allograft (CPT 84863)     ANESTHESIA: General    ESTIMATED BLOOD LOSS:  300 ml    INTRAOPERATIVE COMPLICATIONS: None.    POSTOP CONDITION: Stable.     IMPLANTS:   Implant Name Type Inv. Item Serial No.  Lot No. LRB No. Used Action   PUTTY BIO DBM 5ML W CHIPS - [id number]   PUTTY BIO DBM 5ML W CHIPS   [employer] [facility] [id number] N/A 1 Implanted   ALLOGRAFT BNE CHIP 1-4 MM 30 CC CRUSH CAN - [id number]   ALLOGRAFT BNE CHIP 1-4 MM 30 CC CRUSH CAN [id number] [employer] [facility]   N/A 1 Implanted   GRAFT BNE XL - [id number]   GRAFT BNE XL [id number] [employer]   N/A 1 Implanted       INDICATIONS FOR PROCEDURE: Patient has had low back pain with radiation to the buttocks and lower extremities with saddle anesthesia and urinary retention for the last 2 weeks. The symptoms and exam findings were felt to be consistent with lumbar stenosis. The preoperative radiographs and other imaging confirmed showed an L1 burst fracture resulting in severe stenosis.  He was placed in a TLSO.  The symptoms progressed to the point where there is difficulty performing any ADLs.  Patient lives alone. The risks, benefits and potential complications of the above-listed procedure were discussed with her and an informed consent was obtained.     DESCRIPTION OF PROCEDURE: After adequate induction of general anesthesia, the

## 2024-01-11 VITALS
DIASTOLIC BLOOD PRESSURE: 71 MMHG | RESPIRATION RATE: 17 BRPM | OXYGEN SATURATION: 97 % | HEART RATE: 95 BPM | HEIGHT: 71 IN | SYSTOLIC BLOOD PRESSURE: 120 MMHG | WEIGHT: 186 LBS | TEMPERATURE: 98.6 F | BODY MASS INDEX: 26.04 KG/M2

## 2024-01-11 PROCEDURE — 2580000003 HC RX 258: Performed by: ORTHOPAEDIC SURGERY

## 2024-01-11 PROCEDURE — 97535 SELF CARE MNGMENT TRAINING: CPT

## 2024-01-11 PROCEDURE — 97530 THERAPEUTIC ACTIVITIES: CPT

## 2024-01-11 PROCEDURE — 6370000000 HC RX 637 (ALT 250 FOR IP): Performed by: ORTHOPAEDIC SURGERY

## 2024-01-11 RX ADMIN — OXYCODONE HYDROCHLORIDE 5 MG: 5 TABLET ORAL at 08:55

## 2024-01-11 RX ADMIN — ACETAMINOPHEN 650 MG: 325 TABLET ORAL at 02:52

## 2024-01-11 RX ADMIN — BISACODYL 5 MG: 5 TABLET, COATED ORAL at 08:25

## 2024-01-11 RX ADMIN — DOCUSATE SODIUM 100 MG: 100 CAPSULE, LIQUID FILLED ORAL at 08:24

## 2024-01-11 RX ADMIN — OXYBUTYNIN CHLORIDE 10 MG: 10 TABLET, EXTENDED RELEASE ORAL at 08:24

## 2024-01-11 RX ADMIN — ACETAMINOPHEN 650 MG: 325 TABLET ORAL at 08:55

## 2024-01-11 RX ADMIN — SERTRALINE HYDROCHLORIDE 150 MG: 50 TABLET ORAL at 08:25

## 2024-01-11 RX ADMIN — ATORVASTATIN CALCIUM 40 MG: 40 TABLET, FILM COATED ORAL at 08:25

## 2024-01-11 RX ADMIN — LISINOPRIL 40 MG: 20 TABLET ORAL at 08:25

## 2024-01-11 RX ADMIN — SODIUM CHLORIDE, PRESERVATIVE FREE 5 ML: 5 INJECTION INTRAVENOUS at 08:24

## 2024-01-11 RX ADMIN — POLYETHYLENE GLYCOL 3350 17 G: 17 POWDER, FOR SOLUTION ORAL at 08:24

## 2024-01-11 ASSESSMENT — PAIN SCALES - GENERAL
PAINLEVEL_OUTOF10: 6
PAINLEVEL_OUTOF10: 0
PAINLEVEL_OUTOF10: 0

## 2024-01-11 ASSESSMENT — PAIN DESCRIPTION - DESCRIPTORS
DESCRIPTORS: ACHING
DESCRIPTORS: ACHING

## 2024-01-11 ASSESSMENT — PAIN DESCRIPTION - ORIENTATION
ORIENTATION: LOWER
ORIENTATION: LOWER

## 2024-01-11 ASSESSMENT — PAIN DESCRIPTION - PAIN TYPE
TYPE: SURGICAL PAIN
TYPE: SURGICAL PAIN

## 2024-01-11 ASSESSMENT — PAIN DESCRIPTION - LOCATION
LOCATION: BACK
LOCATION: BACK

## 2024-01-11 NOTE — PROGRESS NOTES
[facility]   Progress Note    Patient: Peter Walters MRN: [id number]  SSN: [id number]    YOB: 1970  Age: 53 y.o.  Sex: male      Admit Date: 1/2/2024    LOS: 5 days     Plan of Care:   1: WB status - WBAT  2: DVT px - SCD's or Sequential Compression Device  3: ABX - Ancef  4: Continue mobilization as tolerated.         Subjective:     Feels about the same as yesterday    Objective:     Vitals:    01/06/24 0754 01/06/24 1921 01/07/24 0415 01/07/24 0738   BP: 127/79 118/75 119/78 126/80   Pulse: 79 86  80   Resp: 19 17  19   Temp: 98.2 °F (36.8 °C) 100 °F (37.8 °C) 98.2 °F (36.8 °C) 99.7 °F (37.6 °C)   TempSrc: Oral Oral Oral Oral   SpO2: 96% 98%  97%   Weight:       Height:            Intake and Output:  Current Shift: 01/07 0701 - 01/07 1900  In: 460 [P.O.:460]  Out: 350 [Urine:350]  Last three shifts: 01/05 1901 - 01/07 0700  In: 716 [P.O.:716]  Out: 1175 [Urine:1175]    Physical Exam:   No significant neurologic changes      Lab/Data Review:  No results found for: \"WBC\", \"HGB\", \"HCT\", \"MCV\", \"PLT\"  Lab Results   Component Value Date/Time     01/02/2024 05:46 PM    K 3.7 01/02/2024 05:46 PM     01/02/2024 05:46 PM    CO2 27 01/02/2024 05:46 PM    BUN 15 01/02/2024 05:46 PM    CREATININE 1.20 01/02/2024 05:46 PM    GLUCOSE 100 01/02/2024 05:46 PM    CALCIUM 9.2 01/02/2024 05:46 PM      Results       ** No results found for the last 336 hours. **          No results found for: \"WBC\", \"HGB\", \"HCT\", \"MCV\", \"PLT\"  No results found for: \"CRP\"  No results found for: \"SEDRATE\"      Assessment:     Principal Problem:    Lumbar burst fracture, closed, initial encounter ([facility])  Resolved Problems:    * No resolved hospital problems. *      Plan:     See top    Signed By: PETER LUX III, MD     January 7, 2024          
             [facility]   Progress Note    Patient: Peter Walters MRN: [id number]  SSN: [id number]    YOB: 1970  Age: 53 y.o.  Sex: male      Admit Date: 1/2/2024    LOS: 4 days     Plan of Care:   1: WB status - WBAT  2: DVT px - SCD's or Sequential Compression Device  3: ABX - Ancef  4: Up with rehab today.  Probable rehab placement pending         Subjective:     Still having pain in his back and difficulty with movement.    Objective:     Vitals:    01/05/24 0429 01/05/24 0808 01/05/24 1933 01/06/24 0754   BP:  130/79 120/73 127/79   Pulse:  83 89 79   Resp: 18 16 17 19   Temp:  97.3 °F (36.3 °C) 98.2 °F (36.8 °C) 98.2 °F (36.8 °C)   TempSrc:  Oral Oral Oral   SpO2:  96% 98% 96%   Weight:       Height:            Intake and Output:  Current Shift: No intake/output data recorded.  Last three shifts: 01/04 1901 - 01/06 0700  In: 966 [P.O.:956; I.V.:10]  Out: 200 [Urine:200]    Physical Exam:   No significant neurologic changes are identified.       Lab/Data Review:  No results found for: \"WBC\", \"HGB\", \"HCT\", \"MCV\", \"PLT\"  Lab Results   Component Value Date/Time     01/02/2024 05:46 PM    K 3.7 01/02/2024 05:46 PM     01/02/2024 05:46 PM    CO2 27 01/02/2024 05:46 PM    BUN 15 01/02/2024 05:46 PM    CREATININE 1.20 01/02/2024 05:46 PM    GLUCOSE 100 01/02/2024 05:46 PM    CALCIUM 9.2 01/02/2024 05:46 PM      Results       ** No results found for the last 336 hours. **          No results found for: \"WBC\", \"HGB\", \"HCT\", \"MCV\", \"PLT\"  No results found for: \"CRP\"  No results found for: \"SEDRATE\"      Assessment:     Principal Problem:    Lumbar burst fracture, closed, initial encounter ([facility])  Resolved Problems:    * No resolved hospital problems. *      Plan:     See top    Signed By: PETER LUX III, MD     January 6, 2024          
    If you need to see a   -  just ask the nurse to call us.    We look forward to serving your family!!        Chaplain Berry  
ACUTE OCCUPATIONAL THERAPY GOALS:   (Developed with and agreed upon by patient and/or caregiver.)    1. Patient will verbalize and demonstrate understanding of spinal precautions with 100% accuracy during ADLs.   2. Patient will complete lower body bathing and dressing with SBA and adaptive equipment as needed.   3. Patient will complete functional transfers with SBA and adaptive equipment as needed.   4. Patient will complete toileting and toilet transfer with SBA.   5. Patient will complete functional mobility of household distances with SBA and adaptive equipment as needed.   6. Patient will demonstrate ability to log roll in bed with SUPERVISION and MIN verbal cues from therapist.  OCCUPATIONAL THERAPY: Daily Note AM   OT Visit Days: 2   Time In/Out  OT Charge Capture  Rehab Caseload Tracker  OT Orders    Jesus Walters is a 53 y.o. male   PRIMARY DIAGNOSIS: Lumbar burst fracture, closed, initial encounter ([facility])  Lumbar burst fracture, closed, initial encounter ([facility]) [S32.001A]  Closed burst fracture of lumbar vertebra with nonunion, subsequent encounter [S32.001K]  Procedure(s) (LRB):  L1 Laminectomy and T10-L3 fusion with allograft and instrumentation (N/A)  6 Days Post-Op  Inpatient: Payor: NATALIA / Plan: TRINO FISHER [facility] / Product Type: *No Product type* /     ASSESSMENT:     REHAB RECOMMENDATIONS: CURRENT LEVEL OF FUNCTION:  (Most Recently Demonstrated)   Recommendation to date pending progress:  Setting:  Short-term Rehab    Equipment:    To Be Determined Bathing:  Not Tested  Dressing:  Moderate Assist to don shoes; min a to don brace  Feeding/Grooming:  Stand by Assist shaving, brushing teeth, washing face  Toileting:  Not Tested  Functional Mobility:  Contact Guard Assist     ASSESSMENT:  Mr. Walters presents with decreased activity tolerance, decreased independence with self care, and decreased independence with mobility. Pt participated in functional mobility with CGA and a rolling walker 
ACUTE OCCUPATIONAL THERAPY GOALS:   (Developed with and agreed upon by patient and/or caregiver.)  1. Patient will verbalize and demonstrate understanding of spinal precautions with 100% accuracy during ADLs.   2. Patient will complete lower body bathing and dressing with SBA and adaptive equipment as needed.   3. Patient will complete functional transfers with SBA and adaptive equipment as needed.   4. Patient will complete toileting and toilet transfer with SBA.   5. Patient will complete functional mobility of household distances with SBA and adaptive equipment as needed.   6. Patient will demonstrate ability to log roll in bed with SUPERVISION and MIN verbal cues from therapist.     Timeframe: 7 visits      OCCUPATIONAL THERAPY Initial Assessment and Daily Note       OT Visit Days: 1  Acknowledge Orders  Time  OT Charge Capture  Rehab Caseload Tracker      Spinal Precautions, TLSO when OOB    Jesus Wlaters is a 53 y.o. male   PRIMARY DIAGNOSIS: Lumbar burst fracture, closed, initial encounter ([facility])  Lumbar burst fracture, closed, initial encounter ([facility]) [S32.001A]  Closed burst fracture of lumbar vertebra with nonunion, subsequent encounter [S32.001K]  Procedure(s) (LRB):  L1 Laminectomy and T10-L3 fusion with allograft and instrumentation (N/A)  1 Day Post-Op  Reason for Referral: Generalized Muscle Weakness (M62.81)  Difficulty in walking, Not elsewhere classified (R26.2)  Other abnormalities of gait and mobility (R26.89)  Low Back Pain (M54.5)  Inpatient: Payor: NATALIA / Plan: TRINO FISHER [facility] / Product Type: *No Product type* /     ASSESSMENT:     REHAB RECOMMENDATIONS:   Recommendation to date pending progress:  Setting:  Short-term Rehab    Equipment:    To Be Determined     ASSESSMENT:  Mr. Walters  is a 54 y/o male presents with L1 burst fracture, now s/p L1 lami and T10-L3 fusion and is now on spinal precautions. At baseline pt lives alone, is independent all ADLs and drives. Today pt 
ACUTE PHYSICAL THERAPY GOALS:   (Developed with and agreed upon by patient and/or caregiver.)   Pt will perform bed mobility with Jolie in 7 therapy sessions.  Pt will perform sit-to-stand/ stand-to-sit transfers SBA in 7 therapy sessions.  Pt will ambulate 250 ft SBA with use of LRAD/no device and breaks as needed in 7 therapy sessions.  Pt will tolerate 15 minutes of standing activity with LRAD/no device in 7 therapy sessions.  Pt will perform standing dynamic balance activities with minimal postural sway in 7 therapy sessions.   Pt will tolerate multiple sets and reps of BLE exercises in 7 therapy sessions    PHYSICAL THERAPY:  Re EvaluationDaily Note PM   (Link to Caseload Tracking: PT Visit Days : 1  Time In/Out PT Charge Capture  Rehab Caseload Tracker  Orders    Spinal Precautions, TLSO when OCATRACHO Walters is a 53 y.o. male   PRIMARY DIAGNOSIS: Lumbar burst fracture, closed, initial encounter ([facility])  Lumbar burst fracture, closed, initial encounter ([facility]) [S32.001A]  Closed burst fracture of lumbar vertebra with nonunion, subsequent encounter [S32.001K]  Procedure(s) (LRB):  L1 Laminectomy and T10-L3 fusion with allograft and instrumentation (N/A)  7 Days Post-Op  Inpatient: Payor: NATALIA / Plan: TRINO FISHER [facility] / Product Type: *No Product type* /     ASSESSMENT:     REHAB RECOMMENDATIONS:   Recommendation to date pending progress:  Setting:  Short-term Rehab    Equipment:    To Be Determined     ASSESSMENT:  Mr. Walters has been seen by PT for mobility and gait training after above back surgery.  Pain has been an issue and since yesterday he has delt a bit with orthostatic hypotension.  Bps done today and documented below.  Minimal symptoms associated.  Bed mobility with cg.  Transfers with cg.  Dons brace with minimal assist.  Using rolling walker and verbal cues for hand placement.  Pace is slow.  Limited distance due to Bps today out of caution but more than yesterday.  Mr. Walters over 
ACUTE PHYSICAL THERAPY GOALS:   (Developed with and agreed upon by patient and/or caregiver.)   Pt will perform bed mobility with Jolie in 7 therapy sessions.  Pt will perform sit-to-stand/ stand-to-sit transfers SBA in 7 therapy sessions.  Pt will ambulate 250 ft SBA with use of LRAD/no device and breaks as needed in 7 therapy sessions.  Pt will tolerate 15 minutes of standing activity with LRAD/no device in 7 therapy sessions.  Pt will perform standing dynamic balance activities with minimal postural sway in 7 therapy sessions.   Pt will tolerate multiple sets and reps of BLE exercises in 7 therapy sessions    PHYSICAL THERAPY: Daily Note AM   (Link to Caseload Tracking: PT Visit Days : 2  Time In/Out PT Charge Capture  Rehab Caseload Tracker  Orders    Spinal Precautions, TLSO when OOB     Jesus Walters is a 53 y.o. male   PRIMARY DIAGNOSIS: Lumbar burst fracture, closed, initial encounter ([facility])  Lumbar burst fracture, closed, initial encounter ([facility]) [S32.001A]  Closed burst fracture of lumbar vertebra with nonunion, subsequent encounter [S32.001K]  Procedure(s) (LRB):  L1 Laminectomy and T10-L3 fusion with allograft and instrumentation (N/A)  2 Days Post-Op  Inpatient: Payor: NATALIA / Plan: TRINO FISHER [facility] / Product Type: *No Product type* /     ASSESSMENT:     REHAB RECOMMENDATIONS:   Recommendation to date pending progress:  Setting:  Short-term Rehab    Equipment:    To Be Determined     ASSESSMENT:  Mr. Walters presents supine on contact, agreed to therapy.  He rolled to L side and was assisted to side of bed with mod assist.  He has difficulty with pushing up due to pain.  He donned brace with assist and then stood with min/mod assist.  He was able to ambulate about 100' x 2 using rolling walker and CGA.  Cues for increased step length and upright posture.  He returned to chair and required increased time for scooting back in the chair.  He was left with needs in reach.  He continues to make 
ACUTE PHYSICAL THERAPY GOALS:   (Developed with and agreed upon by patient and/or caregiver.)   Pt will perform bed mobility with Jolie in 7 therapy sessions.  Pt will perform sit-to-stand/ stand-to-sit transfers SBA in 7 therapy sessions.  Pt will ambulate 250 ft SBA with use of LRAD/no device and breaks as needed in 7 therapy sessions.  Pt will tolerate 15 minutes of standing activity with LRAD/no device in 7 therapy sessions.  Pt will perform standing dynamic balance activities with minimal postural sway in 7 therapy sessions.   Pt will tolerate multiple sets and reps of BLE exercises in 7 therapy sessions    PHYSICAL THERAPY: Daily Note AM   (Link to Caseload Tracking: PT Visit Days : 5  Time In/Out PT Charge Capture  Rehab Caseload Tracker  Orders    Spinal Precautions, TLSO when OOB     Jesus Walters is a 53 y.o. male   PRIMARY DIAGNOSIS: Lumbar burst fracture, closed, initial encounter ([facility])  Lumbar burst fracture, closed, initial encounter ([facility]) [S32.001A]  Closed burst fracture of lumbar vertebra with nonunion, subsequent encounter [S32.001K]  Procedure(s) (LRB):  L1 Laminectomy and T10-L3 fusion with allograft and instrumentation (N/A)  5 Days Post-Op  Inpatient: Payor: NATALIA / Plan: TRINO [facility]ER [facility] / Product Type: *No Product type* /     ASSESSMENT:     REHAB RECOMMENDATIONS:   Recommendation to date pending progress:  Setting:  Short-term Rehab    Equipment:    To Be Determined     ASSESSMENT:  Mr. Walters continues to make slow progress toward goals with increased independence.  He continues to be limited primarily by pain.  The patient required additional time for log rolling and  bed mobility, but he was able to perform these without assistance.  He demonstrated good static sitting balance and was able to don his TLSO with min A.  The patient maintained gait distances with increased  trunk sway and the use of the RW, but demonstrated good balance throughout.      SUBJECTIVE:    
ACUTE PHYSICAL THERAPY GOALS:   (Developed with and agreed upon by patient and/or caregiver.)   Pt will perform bed mobility with Jolie in 7 therapy sessions.  Pt will perform sit-to-stand/ stand-to-sit transfers SBA in 7 therapy sessions.  Pt will ambulate 250 ft SBA with use of LRAD/no device and breaks as needed in 7 therapy sessions.  Pt will tolerate 15 minutes of standing activity with LRAD/no device in 7 therapy sessions.  Pt will perform standing dynamic balance activities with minimal postural sway in 7 therapy sessions.   Pt will tolerate multiple sets and reps of BLE exercises in 7 therapy sessions    PHYSICAL THERAPY: Daily Note AM   (Link to Caseload Tracking: PT Visit Days : 6  Time In/Out PT Charge Capture  Rehab Caseload Tracker  Orders    Spinal Precautions, TLSO when OOB     Jesus Walters is a 53 y.o. male   PRIMARY DIAGNOSIS: Lumbar burst fracture, closed, initial encounter ([facility])  Lumbar burst fracture, closed, initial encounter ([facility]) [S32.001A]  Closed burst fracture of lumbar vertebra with nonunion, subsequent encounter [S32.001K]  Procedure(s) (LRB):  L1 Laminectomy and T10-L3 fusion with allograft and instrumentation (N/A)  6 Days Post-Op  Inpatient: Payor: NATALIA / Plan: TRINO FISHER [facility] / Product Type: *No Product type* /     ASSESSMENT:     REHAB RECOMMENDATIONS:   Recommendation to date pending progress:  Setting:  Short-term Rehab    Equipment:    To Be Determined     ASSESSMENT:  Mr. Walters is making steady progress toward goals.  He continues to report high levels of pain, but is able to mobilize well.  He uses a RW during gait for balance and additional time was spent in static/dynamic standing at the sink.  Cueing provided for the importance of wearing TLSO when out of bed.      SUBJECTIVE:   Mr. Walters states, \"I'd like to shave\"     Social/Functional Lives With: Other (comment) (had been living with sister and brother in law who are stating he cannot return to their 
ACUTE PHYSICAL THERAPY GOALS:   (Developed with and agreed upon by patient and/or caregiver.)   Pt will perform bed mobility with Jolie in 7 therapy sessions.  Pt will perform sit-to-stand/ stand-to-sit transfers SBA in 7 therapy sessions.  Pt will ambulate 250 ft SBA with use of LRAD/no device and breaks as needed in 7 therapy sessions.  Pt will tolerate 15 minutes of standing activity with LRAD/no device in 7 therapy sessions.  Pt will perform standing dynamic balance activities with minimal postural sway in 7 therapy sessions.   Pt will tolerate multiple sets and reps of BLE exercises in 7 therapy sessions    PHYSICAL THERAPY: Daily Note PM   (Link to Caseload Tracking: PT Visit Days : 1  Time In/Out PT Charge Capture  Rehab Caseload Tracker  Orders    Spinal Precautions, TLSO when OOB     Jesus Walters is a 53 y.o. male   PRIMARY DIAGNOSIS: Lumbar burst fracture, closed, initial encounter ([facility])  Lumbar burst fracture, closed, initial encounter ([facility]) [S32.001A]  Closed burst fracture of lumbar vertebra with nonunion, subsequent encounter [S32.001K]  Procedure(s) (LRB):  L1 Laminectomy and T10-L3 fusion with allograft and instrumentation (N/A)  1 Day Post-Op  Inpatient: Payor: NATALIA / Plan: TRINO FISHER [facility] / Product Type: *No Product type* /     ASSESSMENT:     REHAB RECOMMENDATIONS:   Recommendation to date pending progress:  Setting:  Short-term Rehab    Equipment:    To Be Determined     ASSESSMENT:  Mr. Walters was found seated in recliner and agreeable to PT. TLSO brace was worn during entire session, up until returning to supine. Pt was able to ambulate 250' continuously with a RW, Santo to steady, and cues to increase step length and gait speed. During today's session, all activity was performed on RA. Pt made fair progress towards goals today, but continues to be limited by pain during session, Pt will continue to be seen by PT in order to address remaining mobility impairments and return pt to 
ACUTE PHYSICAL THERAPY GOALS:   (Developed with and agreed upon by patient and/or caregiver.)   Pt will perform bed mobility with Jolie in 7 therapy sessions.  Pt will perform sit-to-stand/ stand-to-sit transfers SBA in 7 therapy sessions.  Pt will ambulate 250 ft SBA with use of LRAD/no device and breaks as needed in 7 therapy sessions.  Pt will tolerate 15 minutes of standing activity with LRAD/no device in 7 therapy sessions.  Pt will perform standing dynamic balance activities with minimal postural sway in 7 therapy sessions.   Pt will tolerate multiple sets and reps of BLE exercises in 7 therapy sessions    PHYSICAL THERAPY: Daily Note PM   (Link to Caseload Tracking: PT Visit Days : 3  Time In/Out PT Charge Capture  Rehab Caseload Tracker  Orders    Spinal Precautions, TLSO when OOB     Jesus Walters is a 53 y.o. male   PRIMARY DIAGNOSIS: Lumbar burst fracture, closed, initial encounter ([facility])  Lumbar burst fracture, closed, initial encounter ([facility]) [S32.001A]  Closed burst fracture of lumbar vertebra with nonunion, subsequent encounter [S32.001K]  Procedure(s) (LRB):  L1 Laminectomy and T10-L3 fusion with allograft and instrumentation (N/A)  3 Days Post-Op  Inpatient: Payor: NATALIA / Plan: TRINO FISHER [facility] / Product Type: *No Product type* /     ASSESSMENT:     REHAB RECOMMENDATIONS:   Recommendation to date pending progress:  Setting:  Short-term Rehab    Equipment:    To Be Determined     ASSESSMENT:  Mr. Walters was supine upon contact and agreeable to PT. Patient performed supine to sit with mod assist, additional time, and cues for log rolling technique. Donned TLSO brace EOB with mod assist. Patient transferred to standing with min assist and cues for hand placement. Once standing patient ambulated slowly for 120' with rolling walker, CGA and cues for sequencing/posture/walker utilization. Patient returned to recliner chair where he was instructed in LE exercises to BLE's with cues for improved 
ACUTE PHYSICAL THERAPY GOALS:   (Developed with and agreed upon by patient and/or caregiver.)   Pt will perform bed mobility with Jolie in 7 therapy sessions.  Pt will perform sit-to-stand/ stand-to-sit transfers SBA in 7 therapy sessions.  Pt will ambulate 250 ft SBA with use of LRAD/no device and breaks as needed in 7 therapy sessions.  Pt will tolerate 15 minutes of standing activity with LRAD/no device in 7 therapy sessions.  Pt will perform standing dynamic balance activities with minimal postural sway in 7 therapy sessions.   Pt will tolerate multiple sets and reps of BLE exercises in 7 therapy sessions    PHYSICAL THERAPY: Daily Note PM   (Link to Caseload Tracking: PT Visit Days : 4  Time In/Out PT Charge Capture  Rehab Caseload Tracker  Orders    Spinal Precautions, TLSO when OOB     Jesus Walters is a 53 y.o. male   PRIMARY DIAGNOSIS: Lumbar burst fracture, closed, initial encounter ([facility])  Lumbar burst fracture, closed, initial encounter ([facility]) [S32.001A]  Closed burst fracture of lumbar vertebra with nonunion, subsequent encounter [S32.001K]  Procedure(s) (LRB):  L1 Laminectomy and T10-L3 fusion with allograft and instrumentation (N/A)  4 Days Post-Op  Inpatient: Payor: NATALIA / Plan: TRINO FISHER [facility] / Product Type: *No Product type* /     ASSESSMENT:     REHAB RECOMMENDATIONS:   Recommendation to date pending progress:  Setting:  Short-term Rehab    Equipment:    To Be Determined     ASSESSMENT:  Mr. Walters was supine upon contact and agreeable to PT. Patient performed supine to sit with SBA but with additional time, and cues for log rolling technique. Donned TLSO brace EOB with max assist and cues for technique. Patient transferred to standing with min assist and cues for hand placement. Once standing patient ambulated slowly for 100' x 2 with rolling walker, CGA and cues for sequencing/posture/walker utilization. Patient took a rest break during ambulation bouts. Patient returned to 
ACUTE PHYSICAL THERAPY GOALS:   (Developed with and agreed upon by patient and/or caregiver.)   Pt will perform bed mobility with Jolie in 7 therapy sessions.  Pt will perform sit-to-stand/ stand-to-sit transfers SBA in 7 therapy sessions.  Pt will ambulate 250 ft SBA with use of LRAD/no device and breaks as needed in 7 therapy sessions.  Pt will tolerate 15 minutes of standing activity with LRAD/no device in 7 therapy sessions.  Pt will perform standing dynamic balance activities with minimal postural sway in 7 therapy sessions.   Pt will tolerate multiple sets and reps of BLE exercises in 7 therapy sessions    PHYSICAL THERAPY: Daily Note PM   (Link to Caseload Tracking: PT Visit Days : 5  Time In/Out PT Charge Capture  Rehab Caseload Tracker  Orders    Spinal Precautions, TLSO when OOB     Jesus Walters is a 53 y.o. male   PRIMARY DIAGNOSIS: Lumbar burst fracture, closed, initial encounter ([facility])  Lumbar burst fracture, closed, initial encounter ([facility]) [S32.001A]  Closed burst fracture of lumbar vertebra with nonunion, subsequent encounter [S32.001K]  Procedure(s) (LRB):  L1 Laminectomy and T10-L3 fusion with allograft and instrumentation (N/A)  5 Days Post-Op  Inpatient: Payor: NATALIA / Plan: TRINO FISHER [facility] / Product Type: *No Product type* /     ASSESSMENT:     REHAB RECOMMENDATIONS:   Recommendation to date pending progress:  Setting:  Short-term Rehab    Equipment:    To Be Determined     ASSESSMENT:  Mr. Walters demonstrated continued mobility with the same assist level.  Provided patient education on donning brace and the importance of wearing when out of bed.  The patient maintained gait distance with the RW and SBA. Gait is slow and painful but steady.      SUBJECTIVE:   Mr. Walters states, \"It's just so painful\"     Social/Functional Lives With: Other (comment) (had been living with sister and brother in law who are stating he cannot return to their home)  Type of Home: Homeless  Bathroom 
ACUTE PHYSICAL THERAPY GOALS:   (Developed with and agreed upon by patient and/or caregiver.)  Pt will perform bed mobility with Jolie in 7 therapy sessions.  Pt will perform sit-to-stand/ stand-to-sit transfers SBA in 7 therapy sessions.  Pt will ambulate 250 ft SBA with use of LRAD/no device and breaks as needed in 7 therapy sessions.  Pt will tolerate 15 minutes of standing activity with LRAD/no device in 7 therapy sessions.  Pt will perform standing dynamic balance activities with minimal postural sway in 7 therapy sessions.   Pt will tolerate multiple sets and reps of BLE exercises in 7 therapy sessions.     PHYSICAL THERAPY Initial Assessment and AM  (Link to Caseload Tracking: PT Visit Days : 1  Acknowledge Orders  Time In/Out  PT Charge Capture  Rehab Caseload Tracker    Spinal Precautions, TLSO when OOB     Jesus Walters is a 53 y.o. 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 Walters is a 53 y.o. male presenting to PT following a hospitalization due to a lumbar burst fracture and a subsequent L1 laminectomy and T10-L3 fusion with allograft and instrumentation. At baseline, pt ambulates with no AD and has been living with family. Prior to mobility, spinal precautions were reviewed with pt and pt both verbalized and demonstrated understanding. Per ortho, pt must wear the TLSO 
Discharge instructions, medication side effects sheet, follow up appointment and prescriptions reviewed and explained to the patient. Patient verbalizes understanding of instructions. A copy of discharge instructions and prescriptions  have been given to transport in packet.  Opportunity for questions provided. IV removed    
Nelsonid COVID-19 test sent to lab.   
ORTHO PROGRESS NOTE    2024    Admit Date: 2024  Post Op day: 1 Day Post-Op      Subjective:     Jesus Walters is a patient who is now 1 Day Post-Op  and has complaints of some back pain that has been improving .       Objective:     PT/OT:    Progressing     Vital Signs:    Patient Vitals for the past 8 hrs:   BP Temp Temp src Pulse Resp SpO2   24 0747 132/78 97.9 °F (36.6 °C) Oral 77 16 99 %   24 0515 117/85 97.5 °F (36.4 °C) Oral 76 18 96 %     Temp (24hrs), Av °F (36.7 °C), Min:97.3 °F (36.3 °C), Max:98.6 °F (37 °C)      LAB:    No results for input(s): \"HGB\", \"WBC\", \"PLT\" in the last 72 hours.    Physical Exam:    Awake and in no acute distress.  Mood and affect appropriate.  Respirations unlabored and no evidence cyanosis.  Calves nontender.  Abdomen soft and nontender.  Dressing clean/dry  No new neurologic deficit.    Assessment:      1 Day Post-Op STATUS POST Procedure(s):  L1 Laminectomy and T10-L3 fusion with allograft and instrumentation      Plan:     -PT/OT  -likely will require rehab  -monitor drain out put and pull drain when less than 30cc per shift  Anticipate discharge to:  pending       Signed By: Hugh Morris MD       
ORTHO PROGRESS NOTE    2024    Admit Date: 2024  Post Op day: 5 Days Post-Op      Subjective:     Jesus Walters is a patient who is now 5 Days Post-Op  and has no complaints.       Objective:     PT/OT:    Progressing     Vital Signs:    Patient Vitals for the past 8 hrs:   BP Temp Temp src Pulse Resp SpO2   24 0918 -- -- -- -- 18 --   24 0732 116/83 99.1 °F (37.3 °C) Oral 79 16 98 %   24 0400 -- 99.1 °F (37.3 °C) -- -- -- --     Temp (24hrs), Av °F (37.8 °C), Min:99.1 °F (37.3 °C), Max:100.9 °F (38.3 °C)      LAB:    No results for input(s): \"HGB\", \"WBC\", \"PLT\" in the last 72 hours.    Physical Exam:    Awake and in no acute distress.  Mood and affect appropriate.  Respirations unlabored and no evidence cyanosis.  Calves nontender.  Abdomen soft and nontender.  Dressing clean/dry  No new neurologic deficit.    Assessment:      5 Days Post-Op STATUS POST Procedure(s):  L1 Laminectomy and T10-L3 fusion with allograft and instrumentation      Plan:     -PT/OT  -TLSO when up and ambulating  -dressing changes as needed  Anticipate discharge to: [facility]      Signed By: Hugh Morris MD       
ORTHO PROGRESS NOTE    2024    Admit Date: 2024  Post Op day: 6 Days Post-Op      Subjective:     Jesus Walters is a patient who is now 6 Days Post-Op  and has complaints of post op pain .       Objective:     PT/OT:    Progressing    Vital Signs:    Patient Vitals for the past 8 hrs:   BP Temp Temp src Pulse Resp SpO2   24 0942 -- -- -- -- 18 --   24 0808 118/73 98.4 °F (36.9 °C) Oral 76 16 98 %     Temp (24hrs), Av.3 °F (37.4 °C), Min:98.4 °F (36.9 °C), Max:100.6 °F (38.1 °C)      LAB:    No results for input(s): \"HGB\", \"WBC\", \"PLT\" in the last 72 hours.    Physical Exam:    Awake and in no acute distress.  Mood and affect appropriate.  Respirations unlabored and no evidence cyanosis.  Calves nontender.  Abdomen soft and nontender.  Dressing  changed by nursing recently.   No new neurologic deficit.    Assessment:      6 Days Post-Op STATUS POST Procedure(s):  L1 Laminectomy and T10-L3 fusion with allograft and instrumentation      Plan:     Continue PT/OT  Disposition pending.       Signed By: NIKO FUENTES MD        
ORTHO PROGRESS NOTE    2024    Admit Date: 2024  Post Op day: 8 Days Post-Op      Subjective:     Jesus Walters is a patient who is now 8 Days Post-Op  and has no complaints.       Objective:     PT/OT:    Progressing slowly    Vital Signs:    Patient Vitals for the past 8 hrs:   BP Temp Temp src Pulse Resp SpO2   24 0809 120/71 98.6 °F (37 °C) Oral 95 17 97 %       Temp (24hrs), Av.3 °F (37.4 °C), Min:98.6 °F (37 °C), Max:99.9 °F (37.7 °C)      LAB:    No results for input(s): \"HGB\", \"WBC\", \"PLT\" in the last 72 hours.    Physical Exam:    Awake and in no acute distress.  Mood and affect appropriate.  Respirations unlabored and no evidence cyanosis.  Calves nontender.  Abdomen soft and nontender.  Dressing clean/dry  No new neurologic deficit.    Assessment:      8 Days Post-Op STATUS POST Procedure(s):  L1 Laminectomy and T10-L3 fusion with allograft and instrumentation      Plan:     Anticipate discharge to: SNF tomorrow      Signed By: Hugh Morris MD        
ORTHO PROGRESS NOTE    January 10, 2024    Admit Date: 2024  Post Op day: 7 Days Post-Op      Subjective:     Jesus Walters is a patient who is now 7 Days Post-Op  and has no complaints.       Objective:     PT/OT:    Progressing slowly    Vital Signs:    Patient Vitals for the past 8 hrs:   BP Temp Temp src Pulse Resp SpO2   01/10/24 1022 -- -- -- -- 18 --   01/10/24 0816 107/73 98.4 °F (36.9 °C) Axillary 72 16 97 %     Temp (24hrs), Av °F (37.2 °C), Min:98.4 °F (36.9 °C), Max:99.5 °F (37.5 °C)      LAB:    No results for input(s): \"HGB\", \"WBC\", \"PLT\" in the last 72 hours.    Physical Exam:    Awake and in no acute distress.  Mood and affect appropriate.  Respirations unlabored and no evidence cyanosis.  Calves nontender.  Abdomen soft and nontender.  Dressing clean/dry  No new neurologic deficit.    Assessment:      7 Days Post-Op STATUS POST Procedure(s):  L1 Laminectomy and T10-L3 fusion with allograft and instrumentation      Plan:     Anticipate discharge to: SNF tomorrow      Signed By: NIKO FUENTES MD        
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  Patient is needing rehab or SNF in the postoperative setting.  He did ambulate 250 feet today.  But there will be difficulty caring for himself.  Patient was living with his brother/sister-in-law and he unfortunately is not able to return back there upon discharge to the hospital.  I did discuss this with the social work.  We are going to try a skilled nursing facility.  If not he may need shelter to discharge home 2.      Objective:     Vital Signs:    Blood pressure 130/79, pulse 83, temperature 97.3 °F (36.3 °C), temperature source Oral, resp. rate 16, height 1.803 m (5' 11\"), weight 84.4 kg (186 lb), SpO2 96 %.    Temp (24hrs), Av.8 °F (36.6 °C), Min:97.3 °F (36.3 °C), Max:98.2 °F (36.8 °C)      701 - 1900  In: 480 [P.O.:480]  Out: -   1901 -  0700  In: 454 [P.O.:444; I.V.:10]  Out: 935 [Urine:775; Drains:160]    LAB:    No results for input(s): \"HGB\", \"WBC\", \"PLT\" in the last 72 hours.    Physical Exam    General:   Alert and oriented. No acute distress  Lungs:  Respirations unlabored.  Extremities: No evidence of cyanosis. Calves soft, nontender.    Moves both upper and lower extremities.   Dressing:  clean, dry, and intact  Neuro:  no deficit      Assessment:      Patient Active Problem List   Diagnosis    Male hypogonadism    Sleep related bruxism    Hyperlipidemia    PLMD (periodic limb movement disorder)    Depression    Anxiety    Lumbar pain    Persistent disorder of initiating or maintaining sleep    Elevated PSA    Obstructive sleep apnea 
Patient has temp of 100.9 even after tylenol. MD notified. No new orders received.   
Patients back dressing dripping blood from the bottom of the incision; ortho has been notified; no new orders received. Blisters have formed from the sticky part of previous dressings; RN applied new dressings with gauze and paper tape.   
Please complete and sign MRI screening form.  
Pt accidentally pulled out drain when he was trying to stand up  
Received consult for spiritual care. Conveyed care and concern for Mr. Walters and offered spiritual/emotional support including prayer.     Reverend Nicholas Aguirre MDiv  Board Certified   
TRANSFER - IN REPORT:    Verbal report received from YI Dennis on Jesus Walters  being received from [facility] ED for routine progression of patient care      Report consisted of patient's Situation, Background, Assessment and   Recommendations(SBAR).     Information from the following report(s) Nurse Handoff Report was reviewed with the receiving nurse.    Opportunity for questions and clarification was provided.      Assessment will be completed upon patient's arrival to unit and care assumed.     
TRANSFER - OUT REPORT:    Verbal report given to Kylie on Jesus Walters  being transferred to [facility] room 402 for routine progression of patient care       Report consisted of patient's Situation, Background, Assessment and   Recommendations(SBAR).     Information from the following report(s) Nurse Handoff Report was reviewed with the receiving nurse.                Opportunity for questions and clarification was provided.      Patient transported with:Transport          
been living with sister and brother in law who are stating he cannot return to their home)  Type of Home: Homeless  Bathroom Shower/Tub: None  Bathroom Equipment: None  Home Equipment:  (TLSO brace)  Receives Help From: Other (comment)  ADL Assistance: Needs assistance  Homemaking Responsibilities: No  Ambulation Assistance: Needs assistance  Transfer Assistance: Independent  Active : No  Occupation: Other(comment)  OBJECTIVE:     PAIN: VITALS / O2: PRECAUTION / LINES / DRAINS:   Pre Treatment:    0      Post Treatment: 3 Vitals        Oxygen    None    RESTRICTIONS/PRECAUTIONS:  Restrictions/Precautions  Required Braces or Orthoses?:  (TLSO when OOB)  Position Activity Restriction  Spinal Precautions: No Bending, No Lifting, No Twisting  Required Braces or Orthoses?:  (TLSO when OOB)     MOBILITY: I Mod I S SBA CGA Min Mod Max Total  NT x2 Comments:   Bed Mobility    Rolling [] [] [] [x] [] [] [] [] [] [] []    Supine to Sit [] [] [] [x] [] [] [] [] [] [] []    Scooting [] [] [] [x] [] [] [] [] [] [] []    Sit to Supine [] [] [] [] [] [] [] [] [] [x] []    Transfers    Sit to Stand [] [] [] [] [x] [] [] [] [] [] []    Bed to Chair [] [] [] [] [x] [] [] [] [] [] []    Stand to Sit [] [] [] [] [x] [] [] [] [] [] []     [] [] [] [] [] [] [] [] [] [] []    I=Independent, Mod I=Modified Independent, S=Supervision, SBA=Standby Assistance, CGA=Contact Guard Assistance,   Min=Minimal Assistance, Mod=Moderate Assistance, Max=Maximal Assistance, Total=Total Assistance, NT=Not Tested    BALANCE: Good Fair+ Fair Fair- Poor NT Comments   Sitting Static [x] [] [] [] [] []    Sitting Dynamic [x] [] [] [] [] []              Standing Static [] [x] [] [] [] []    Standing Dynamic [] [x] [] [] [] []      GAIT: I Mod I S SBA CGA Min Mod Max Total  NT x2 Comments:   Level of Assistance [] [] [] [x] [] [] [] [] [] [] []    Distance 20  feet    DME Rolling Walker    Gait Quality Decreased rikki , Decreased step clearance, and 
Assistance [] [] [] [] [] [] [] [] [] [x] [] + TLSO brace   Distance      DME Rolling Walker    Gait Quality Decreased rikki , Decreased step length, Narrow base of support, and Trunk sway increased    Weightbearing Status      Stairs      I=Independent, Mod I=Modified Independent, S=Supervision, SBA=Standby Assistance, CGA=Contact Guard Assistance,   Min=Minimal Assistance, Mod=Moderate Assistance, Max=Maximal Assistance, Total=Total Assistance, NT=Not Tested    PLAN:   FREQUENCY AND DURATION: BID for duration of hospital stay or until stated goals are met, whichever comes first.    TREATMENT:   TREATMENT:   Therapeutic Activity (14 Minutes): Therapeutic activity included Rolling, Supine to Sit, Sit to Supine, Scooting, Transfer Training, Sitting balance , and Standing balance to improve functional Activity tolerance, Balance, Mobility, and Strength.    TREATMENT GRID:  N/A    AFTER TREATMENT PRECAUTIONS: Bed, Bed/Chair Locked, Call light within reach, Needs within reach, and RN notified    INTERDISCIPLINARY COLLABORATION:  RN/ PCT and PT/ PTA    EDUCATION:      TIME IN/OUT:  Time In: 1409  Time Out: 1423  Minutes: 14    Ava Hernandez PTA   
[] [] []    Sitting Dynamic [x] [] [] [] [] []              Standing Static [] [x] [] [] [] []    Standing Dynamic [] [x] [] [] [] []      GAIT: I Mod I S SBA CGA Min Mod Max Total  NT x2 Comments:   Level of Assistance [] [] [] [] [x] [x] [] [] [] [] [] + TLSO brace   Distance  100' feet x 2    DME Rolling Walker    Gait Quality Decreased rikki , Decreased step length, Narrow base of support, and Trunk sway increased    Weightbearing Status      Stairs      I=Independent, Mod I=Modified Independent, S=Supervision, SBA=Standby Assistance, CGA=Contact Guard Assistance,   Min=Minimal Assistance, Mod=Moderate Assistance, Max=Maximal Assistance, Total=Total Assistance, NT=Not Tested    PLAN:   FREQUENCY AND DURATION: BID for duration of hospital stay or until stated goals are met, whichever comes first.    TREATMENT:   TREATMENT:   Therapeutic Activity (24 Minutes): Therapeutic activity included Rolling, Supine to Sit, Scooting, Transfer Training, Ambulation on level ground, Sitting balance , and Standing balance to improve functional Activity tolerance, Balance, Coordination, Mobility, Strength, and ROM.    TREATMENT GRID:  N/A    AFTER TREATMENT PRECAUTIONS: Alarm Activated, Bed/Chair Locked, Call light within reach, Chair, Needs within reach, and RN notified    INTERDISCIPLINARY COLLABORATION:  RN/ PCT and PT/ PTA    EDUCATION:      TIME IN/OUT:  Time In: 1443  Time Out: 1507  Minutes: 24    HERNANDO ALCANTARA PTA   
[] [] [] [] [] []     Toileting [] [] [] [] [] [] [] [] [] []    Upper Body Dressing [] [] [] [x] [] [] [] [] [] []    Lower Body Dressing [] [] [] [x] [] [] [] [] [] []    Feeding [] [] [] [] [] [] [] [] [] []    Grooming [] [] [] [x] [] [] [] [] [] []    Personal Device Care [] [] [] [] [] [] [] [] [] []    Functional Mobility [] [] [] [x] [x] [] [] [] [] []    I=Independent, Mod I=Modified Independent, S=Supervision/Setup, SBA=Standby Assistance, CGA=Contact Guard Assistance, Min=Minimal Assistance, Mod=Moderate Assistance, Max=Maximal Assistance, Total=Total Assistance, NT=Not Tested    BALANCE: Good Fair+ Fair Fair- Poor NT Comments   Sitting Static [] [x] [] [] [] []    Sitting Dynamic [] [x] [] [] [] []              Standing Static [] [x] [] [] [] []    Standing Dynamic [] [x] [] [] [] []        PLAN:     FREQUENCY/DURATION   OT Plan of Care: 3 times/week for duration of hospital stay or until stated goals are met, whichever comes first.    TREATMENT:     TREATMENT:   Co-Treatment PT/OT necessary due to patient's decreased overall endurance/tolerance levels, as well as need for high level skilled assistance to complete functional transfers/mobility and functional tasks  Self Care (13 minutes): Patient participated in upper body dressing, lower body dressing, personal device care, and grooming ADLs in unsupported sitting with moderate visual, verbal, manual, and tactile cueing to increase independence, decrease assistance required, increase activity tolerance, and maintain precautions. Patient also participated in functional mobility, functional transfer, and adaptive equipment training to increase independence, decrease assistance required, increase activity tolerance, increase safety awareness, and maintain precautions.     TREATMENT GRID:  N/A    AFTER TREATMENT PRECAUTIONS: Bed/Chair Locked, Chair, Needs within reach, and RN notified    INTERDISCIPLINARY COLLABORATION:  RN/ PCT, PT/ PTA, and OT/

## 2024-01-11 NOTE — CARE COORDINATION
Chart review complete.  Insurance approval still pending for STR admission to [facility].  CM updated pt.  CM following.  
Chart reviewed for ongoing dc planning.  Therapy evals complete with the recommendation for STR at [facility].  CM met with pt to discuss these recommendations.  He is in agreement with rehab at [facility] and is open to both acute and subacute rehab.  He stated he would prefer to go somewhere outside of this hospital setting.  He is agreeable to a referral to [facility].  He also expressed interest in [facility] and is aware it is a SNF.  Referral submitted to [facility] and  alerted their liaison to the referral.  PPD ordered today in the event subacute rehab is needed.  CM following.  
[facility] declined to accept the pt.  He is functioning well above the criteria for inpatient rehab (walked 250' with therapy).  CM submitted referral to [facility].  CM is not confident pt will be accepted for admission or his insurance will approve since he is walking that distance.  CM will continue to follow to assist as needed.    1538:  [facility]  Additional referrals sent to:  [facility] / [facility] available  [facility] - declined to accept / no bed available  [facility] - declined to accept  no bed available  [facility] - awaiting response  [facility] - awaiting response  [facility] - awaiting response.    CM met with pt to notify him that [facility] and [facility] had declined.  He requested that CM continue efforts to find a SNF that will accept.  CM did explain to him that even if a facility accepts that insurance approval is required and they could deny the request.  CM encouraged him (and the PT who was in the room) to continue to work with PT/OT on the functional skills he will need to dc to home in case insurance denies.  CM will continue to send referrals.   
Insurance approval received for STR admission to [facility].  The bed will be available tomorrow morning.  CM notified pt and MD and will continue to follow to facilitate pt's transfer to rehab tomorrow if he is medically cleared for dc.  Rapid COVID ordered.     1332:  Pt will dc to [facility] tomorrow morning.  TRANSPORT SCHEDULED FOR THURSDAY, 1/11/24 @ 10AM THROUGH [facility].  Pt will admit to room 402. RN to call report to #[phone].    
Pt was medically cleared for dc today and transferred to [facility] for STR services.  Transport provided by [facility].  Pt will notify his family.       01/11/24 1158   Service Assessment   Patient Orientation Alert and Oriented   Cognition Alert   History Provided By Patient;Medical Record   Primary Caregiver Self   Support Systems Family Members   Patient's Healthcare Decision Maker is: Legal Next of Kin   PCP Verified by CM Yes   Last Visit to PCP Within last 3 months   Prior Functional Level Independent in ADLs/IADLs   Current Functional Level Assistance with the following:;Mobility;Bathing;Dressing   Can patient return to prior living arrangement No   Ability to make needs known: Good   Family able to assist with home care needs: No   Would you like for me to discuss the discharge plan with any other family members/significant others, and if so, who? No   Financial Resources Other (Comment)  (Estately commercial policy)   Community Resources None   Social/Functional History   Lives With Other (comment)  (had been living with sister and brother in law who are stating he cannot return to their home)   Type of Home Homeless   Bathroom Shower/Tub None   Bathroom Equipment None   Home Equipment   (TLSO brace)   Receives Help From Other (comment)   ADL Assistance Needs assistance   Homemaking Responsibilities No   Ambulation Assistance Needs assistance   Transfer Assistance Independent   Active  No   Occupation Other(comment)   Discharge Planning   Type of Residence Acute Rehab   Living Arrangements Family Members   Current Services Prior To Admission None   Potential Assistance Needed Extended Care Facility   DME Ordered? No   Potential Assistance Purchasing Medications No   Type of Home Care Services None   Patient expects to be discharged to: Rehabilitation facility   One/Two Story Residence One story   Services At/After Discharge   Transition of Care Consult (CM Consult) Discharge Planning   Services 
Assistance Independent   Active  No   Occupation Other(comment)   Discharge Planning   Type of Residence Homeless   Living Arrangements Other (Comment)   Current Services Prior To Admission None   Potential Assistance Needed N/A   DME Ordered? No   Potential Assistance Purchasing Medications No   Type of Home Care Services None   One/Two Story Residence One story   Services At/After Discharge   Transition of Care Consult (CM Consult) Discharge Planning   Services At/After Discharge None   Condition of Participation: Discharge Planning   The Patient and/or Patient Representative was provided with a Choice of Provider? Patient   The Patient and/Or Patient Representative agree with the Discharge Plan? Yes   Freedom of Choice list was provided with basic dialogue that supports the patient's individualized plan of care/goals, treatment preferences, and shares the quality data associated with the providers?  Yes

## 2024-01-11 NOTE — PLAN OF CARE
Problem: Discharge Planning  Goal: Discharge to home or other facility with appropriate resources  1/3/2024 2049 by Marta Dalton RN  Outcome: Progressing  Flowsheets (Taken 1/3/2024 1919)  Discharge to home or other facility with appropriate resources: Identify barriers to discharge with patient and caregiver  1/3/2024 1622 by Ban Polanco RN  Outcome: Progressing     Problem: Pain  Goal: Verbalizes/displays adequate comfort level or baseline comfort level  1/3/2024 2049 by Marta Dalton RN  Outcome: Progressing  1/3/2024 1622 by Ban Polanco RN  Outcome: Progressing     Problem: Safety - Adult  Goal: Free from fall injury  1/3/2024 2049 by Marta Dalton RN  Outcome: Progressing  1/3/2024 1622 by Ban Polanco RN  Outcome: Progressing  Flowsheets (Taken 1/3/2024 1102 by Krista Jamil RN)  Free From Fall Injury:   Based on caregiver fall risk screen, instruct family/caregiver to ask for assistance with transferring infant if caregiver noted to have fall risk factors   Instruct family/caregiver on patient safety     Problem: ABCDS Injury Assessment  Goal: Absence of physical injury  1/3/2024 2049 by Marta Dalton RN  Outcome: Progressing  1/3/2024 1622 by Ban Polanco RN  Outcome: Progressing  Flowsheets (Taken 1/3/2024 1102 by Krista Jamil RN)  Absence of Physical Injury: Implement safety measures based on patient assessment     Problem: Neurosensory - Adult  Goal: Achieves stable or improved neurological status  1/3/2024 2049 by Marta Dalton RN  Outcome: Progressing  Flowsheets (Taken 1/3/2024 1919)  Achieves stable or improved neurological status: Assess for and report changes in neurological status  1/3/2024 1622 by Ban Polanco RN  Outcome: Progressing  Flowsheets (Taken 1/3/2024 1520 by Margaux Shankar, RN)  Achieves stable or improved neurological status: Assess for and report changes in neurological status  Goal: Achieves maximal functionality and self 
  Problem: Discharge Planning  Goal: Discharge to home or other facility with appropriate resources  1/5/2024 0910 by Ban Polanco RN  Outcome: Progressing  1/5/2024 0400 by Sanjana Samano RN  Outcome: Progressing  Flowsheets (Taken 1/3/2024 1919 by Marta Dalton, RN)  Discharge to home or other facility with appropriate resources: Identify barriers to discharge with patient and caregiver     Problem: Pain  Goal: Verbalizes/displays adequate comfort level or baseline comfort level  1/5/2024 0910 by Ban Polanco RN  Outcome: Progressing  1/5/2024 0400 by Sanjana Samano RN  Outcome: Progressing  Flowsheets (Taken 1/4/2024 2119)  Verbalizes/displays adequate comfort level or baseline comfort level:   Encourage patient to monitor pain and request assistance   Assess pain using appropriate pain scale   Administer analgesics based on type and severity of pain and evaluate response   Implement non-pharmacological measures as appropriate and evaluate response   Notify Licensed Independent Practitioner if interventions unsuccessful or patient reports new pain     Problem: Safety - Adult  Goal: Free from fall injury  1/5/2024 0910 by Ban Polanco RN  Outcome: Progressing  1/5/2024 0400 by Sanjana Samano RN  Outcome: Progressing  Flowsheets (Taken 1/4/2024 1935)  Free From Fall Injury: Instruct family/caregiver on patient safety     Problem: ABCDS Injury Assessment  Goal: Absence of physical injury  1/5/2024 0910 by Ban Polanco RN  Outcome: Progressing  1/5/2024 0400 by Sanjana Samano RN  Outcome: Progressing  Flowsheets (Taken 1/4/2024 1935)  Absence of Physical Injury: Implement safety measures based on patient assessment     Problem: Neurosensory - Adult  Goal: Achieves stable or improved neurological status  1/5/2024 0910 by Ban Polanco RN  Outcome: Progressing  1/5/2024 0400 by Sanjana Samano RN  Outcome: Progressing  Flowsheets (Taken 1/3/2024 1919 by Marta Dalton, RN)  Achieves stable or 
  Problem: Discharge Planning  Goal: Discharge to home or other facility with appropriate resources  1/7/2024 2216 by Emma Antonio RN  Outcome: Progressing  1/7/2024 1045 by Kathie Chen RN  Outcome: Progressing     Problem: Pain  Goal: Verbalizes/displays adequate comfort level or baseline comfort level  1/7/2024 2216 by Emma Antonio RN  Outcome: Progressing  1/7/2024 1045 by Kathie Chen RN  Outcome: Progressing     Problem: Safety - Adult  Goal: Free from fall injury  1/7/2024 2216 by Emma Antonio RN  Outcome: Progressing  1/7/2024 1045 by Kathie Chen RN  Outcome: Progressing     Problem: ABCDS Injury Assessment  Goal: Absence of physical injury  1/7/2024 2216 by Emma Antonio RN  Outcome: Progressing  1/7/2024 1045 by Kathie Chen RN  Outcome: Progressing     Problem: Neurosensory - Adult  Goal: Achieves stable or improved neurological status  1/7/2024 2216 by Emma Antonio RN  Outcome: Progressing  1/7/2024 1045 by Kathie Chen RN  Outcome: Progressing  Goal: Achieves maximal functionality and self care  1/7/2024 2216 by Emma Antonio RN  Outcome: Progressing  1/7/2024 1045 by Kathie Chen RN  Outcome: Progressing     Problem: Cardiovascular - Adult  Goal: Maintains optimal cardiac output and hemodynamic stability  1/7/2024 2216 by Emma Antonio RN  Outcome: Progressing  1/7/2024 1045 by Kathie Chen RN  Outcome: Progressing     Problem: Skin/Tissue Integrity - Adult  Goal: Skin integrity remains intact  1/7/2024 2216 by Emma Antonio RN  Outcome: Progressing  1/7/2024 1045 by Kathie Chen RN  Outcome: Progressing  Goal: Incisions, wounds, or drain sites healing without S/S of infection  1/7/2024 2216 by Emma Antonio RN  Outcome: Progressing  1/7/2024 1045 by Kathie Chen RN  Outcome: Progressing     Problem: Musculoskeletal - Adult  Goal: Return mobility to safest level of function  1/7/2024 2216 by Emma Antonio RN  Outcome: 
  Problem: Discharge Planning  Goal: Discharge to home or other facility with appropriate resources  Outcome: Progressing     Problem: Pain  Goal: Verbalizes/displays adequate comfort level or baseline comfort level  Outcome: Progressing     Problem: Safety - Adult  Goal: Free from fall injury  Outcome: Progressing     Problem: ABCDS Injury Assessment  Goal: Absence of physical injury  Outcome: Progressing     Problem: Neurosensory - Adult  Goal: Achieves stable or improved neurological status  Outcome: Progressing  Flowsheets  Taken 1/10/2024 1400 by Nick Moulton RN  Achieves stable or improved neurological status: Monitor temperature, glucose, and sodium. Initiate appropriate interventions as ordered  Taken 1/10/2024 1200 by Nick Moulton RN  Achieves stable or improved neurological status: Monitor temperature, glucose, and sodium. Initiate appropriate interventions as ordered  Goal: Achieves maximal functionality and self care  Outcome: Progressing  Flowsheets  Taken 1/10/2024 1400 by Nick Moulton RN  Achieves maximal functionality and self care: Encourage visually impaired, hearing impaired and aphasic patients to use assistive/communication devices  Taken 1/10/2024 1200 by Nick Moulton RN  Achieves maximal functionality and self care: Monitor swallowing and airway patency with patient fatigue and changes in neurological status     Problem: Cardiovascular - Adult  Goal: Maintains optimal cardiac output and hemodynamic stability  Outcome: Progressing     Problem: Skin/Tissue Integrity - Adult  Goal: Skin integrity remains intact  Outcome: Progressing  Goal: Incisions, wounds, or drain sites healing without S/S of infection  Outcome: Progressing     Problem: Musculoskeletal - Adult  Goal: Return mobility to safest level of function  Outcome: Progressing  Goal: Maintain proper alignment of affected body part  Outcome: Progressing  Goal: Return ADL status to a safe level of function  Outcome: 
  Problem: Discharge Planning  Goal: Discharge to home or other facility with appropriate resources  Outcome: Progressing     Problem: Pain  Goal: Verbalizes/displays adequate comfort level or baseline comfort level  Outcome: Progressing     Problem: Safety - Adult  Goal: Free from fall injury  Outcome: Progressing     Problem: ABCDS Injury Assessment  Goal: Absence of physical injury  Outcome: Progressing     Problem: Neurosensory - Adult  Goal: Achieves stable or improved neurological status  Outcome: Progressing  Flowsheets  Taken 1/8/2024 1600 by Nick Moulton RN  Achieves stable or improved neurological status: Assess for and report changes in neurological status  Taken 1/8/2024 1200 by Nick Moulton RN  Achieves stable or improved neurological status: Maintain blood pressure and fluid volume within ordered parameters to optimize cerebral perfusion and minimize risk of hemorrhage  Goal: Achieves maximal functionality and self care  Outcome: Progressing  Flowsheets  Taken 1/8/2024 1600 by Nick Moulton RN  Achieves maximal functionality and self care: Encourage and assist patient to increase activity and self care with guidance from physical therapy/occupational therapy  Taken 1/8/2024 1200 by Nick Moulton RN  Achieves maximal functionality and self care: Encourage visually impaired, hearing impaired and aphasic patients to use assistive/communication devices     Problem: Cardiovascular - Adult  Goal: Maintains optimal cardiac output and hemodynamic stability  Outcome: Progressing     Problem: Skin/Tissue Integrity - Adult  Goal: Skin integrity remains intact  Outcome: Progressing  Goal: Incisions, wounds, or drain sites healing without S/S of infection  Outcome: Progressing     Problem: Musculoskeletal - Adult  Goal: Return mobility to safest level of function  Outcome: Progressing  Goal: Maintain proper alignment of affected body part  Outcome: Progressing  Goal: Return ADL status to a safe 
  Problem: Discharge Planning  Goal: Discharge to home or other facility with appropriate resources  Outcome: Progressing     Problem: Pain  Goal: Verbalizes/displays adequate comfort level or baseline comfort level  Outcome: Progressing     Problem: Safety - Adult  Goal: Free from fall injury  Outcome: Progressing     Problem: ABCDS Injury Assessment  Goal: Absence of physical injury  Outcome: Progressing     Problem: Neurosensory - Adult  Goal: Achieves stable or improved neurological status  Outcome: Progressing  Goal: Achieves maximal functionality and self care  Outcome: Progressing     Problem: Cardiovascular - Adult  Goal: Maintains optimal cardiac output and hemodynamic stability  Outcome: Progressing     Problem: Skin/Tissue Integrity - Adult  Goal: Skin integrity remains intact  Outcome: Progressing  Goal: Incisions, wounds, or drain sites healing without S/S of infection  Outcome: Progressing     Problem: Musculoskeletal - Adult  Goal: Return mobility to safest level of function  Outcome: Progressing  Goal: Maintain proper alignment of affected body part  Outcome: Progressing  Goal: Return ADL status to a safe level of function  Outcome: Progressing     Problem: Gastrointestinal - Adult  Goal: Minimal or absence of nausea and vomiting  Outcome: Progressing  Goal: Maintains or returns to baseline bowel function  Outcome: Progressing  Goal: Maintains adequate nutritional intake  Outcome: Progressing     Problem: Genitourinary - Adult  Goal: Absence of urinary retention  Outcome: Progressing     Problem: Infection - Adult  Goal: Absence of infection at discharge  Outcome: Progressing     Problem: Metabolic/Fluid and Electrolytes - Adult  Goal: Electrolytes maintained within normal limits  Outcome: Progressing  Goal: Hemodynamic stability and optimal renal function maintained  Outcome: Progressing     Problem: Hematologic - Adult  Goal: Maintains hematologic stability  Outcome: Progressing     
RN  Outcome: Progressing  1/6/2024 1053 by Kathie Chen RN  Outcome: Not Progressing     Problem: Discharge Planning  Goal: Discharge to home or other facility with appropriate resources  1/6/2024 2222 by Emma Antonio RN  Outcome: Progressing  1/6/2024 1053 by Kathie Chen RN  Outcome: Not Progressing     Problem: Pain  Goal: Verbalizes/displays adequate comfort level or baseline comfort level  1/6/2024 2222 by Emma Antonio RN  Outcome: Progressing  1/6/2024 1053 by Kathie Chen RN  Outcome: Not Progressing     Problem: Safety - Adult  Goal: Free from fall injury  1/6/2024 2222 by Emma Antonoi RN  Outcome: Progressing  1/6/2024 1053 by Kathie Chen RN  Outcome: Not Progressing     Problem: ABCDS Injury Assessment  Goal: Absence of physical injury  1/6/2024 2222 by Emma Antonio RN  Outcome: Progressing  1/6/2024 1053 by Kathie Chen RN  Outcome: Not Progressing     Problem: Neurosensory - Adult  Goal: Achieves stable or improved neurological status  1/6/2024 2222 by Emma Antonio RN  Outcome: Progressing  1/6/2024 1053 by Kathie Chen RN  Outcome: Not Progressing  Goal: Achieves maximal functionality and self care  1/6/2024 2222 by Emma Antonio RN  Outcome: Progressing  1/6/2024 1053 by Kathie Chen RN  Outcome: Not Progressing     Problem: Cardiovascular - Adult  Goal: Maintains optimal cardiac output and hemodynamic stability  1/6/2024 2222 by Emma Antonio RN  Outcome: Progressing  1/6/2024 1053 by Kathie Chen RN  Outcome: Not Progressing     Problem: Skin/Tissue Integrity - Adult  Goal: Skin integrity remains intact  1/6/2024 2222 by Emma Antonio RN  Outcome: Progressing  1/6/2024 1053 by Kathie Chen RN  Outcome: Not Progressing  Goal: Incisions, wounds, or drain sites healing without S/S of infection  1/6/2024 2222 by Emma Antonio RN  Outcome: Progressing  1/6/2024 1053 by Flake, Kathie, RN  Outcome: Not Progressing     Problem: 
0800)  Absence of urinary retention: Monitor intake/output and perform bladder scan as needed     Problem: Infection - Adult  Goal: Absence of infection at discharge  1/9/2024 2138 by Sakina Valera RN  Outcome: Progressing  1/9/2024 1231 by Nick Moulton RN  Outcome: Progressing  Flowsheets (Taken 1/9/2024 0800)  Absence of infection at discharge: Anderson appropriate cooling/warming therapies per order     Problem: Metabolic/Fluid and Electrolytes - Adult  Goal: Electrolytes maintained within normal limits  1/9/2024 2138 by Sakina Valera RN  Outcome: Progressing  1/9/2024 1231 by Nick Moulton RN  Outcome: Progressing  Flowsheets (Taken 1/9/2024 0800)  Electrolytes maintained within normal limits: Fluid restriction as ordered  Goal: Hemodynamic stability and optimal renal function maintained  1/9/2024 2138 by Sakina Valera RN  Outcome: Progressing  1/9/2024 1231 by Nick Moulton RN  Outcome: Progressing  Flowsheets (Taken 1/9/2024 0800)  Hemodynamic stability and optimal renal function maintained: Monitor urine specific gravity, serum osmolarity and serum sodium as indicated or ordered     Problem: Hematologic - Adult  Goal: Maintains hematologic stability  1/9/2024 2138 by Sakina Valera RN  Outcome: Progressing  1/9/2024 1231 by Nick Moulton RN  Outcome: Progressing  Flowsheets (Taken 1/9/2024 0800)  Maintains hematologic stability: Administer blood products/factors as ordered     
Progressing  Goal: Hemodynamic stability and optimal renal function maintained  1/3/2024 1622 by Ban Polanco RN  Outcome: Progressing  Flowsheets (Taken 1/3/2024 1450 by Margaux Shankar RN)  Hemodynamic stability and optimal renal function maintained: Monitor labs and assess for signs and symptoms of volume excess or deficit  1/3/2024 0238 by Hanna Cherry RN  Outcome: Progressing     Problem: Hematologic - Adult  Goal: Maintains hematologic stability  1/3/2024 1622 by Ban Polanco RN  Outcome: Progressing  Flowsheets (Taken 1/3/2024 1450 by Margaux Shankar, RN)  Maintains hematologic stability: Assess for signs and symptoms of bleeding or hemorrhage  1/3/2024 0238 by Hanna Cherry RN  Outcome: Progressing

## 2024-01-30 DIAGNOSIS — R97.20 ELEVATED PSA: Primary | ICD-10-CM

## 2024-02-01 ENCOUNTER — TELEPHONE (OUTPATIENT)
Dept: ORTHOPEDIC SURGERY | Age: 54
End: 2024-02-01

## 2024-02-05 ENCOUNTER — APPOINTMENT (OUTPATIENT)
Dept: CT IMAGING | Age: 54
End: 2024-02-05
Payer: COMMERCIAL

## 2024-02-05 ENCOUNTER — HOSPITAL ENCOUNTER (EMERGENCY)
Age: 54
Discharge: HOME | End: 2024-02-05
Attending: EMERGENCY MEDICINE
Payer: COMMERCIAL

## 2024-02-05 ENCOUNTER — OFFICE VISIT (OUTPATIENT)
Dept: ORTHOPEDIC SURGERY | Age: 54
End: 2024-02-05

## 2024-02-05 VITALS
HEIGHT: 71 IN | BODY MASS INDEX: 24.5 KG/M2 | WEIGHT: 175 LBS | TEMPERATURE: 98.7 F | OXYGEN SATURATION: 98 % | SYSTOLIC BLOOD PRESSURE: 132 MMHG | DIASTOLIC BLOOD PRESSURE: 71 MMHG | RESPIRATION RATE: 12 BRPM | HEART RATE: 87 BPM

## 2024-02-05 DIAGNOSIS — Z98.1 STATUS POST LUMBAR SPINAL ARTHRODESIS: Primary | ICD-10-CM

## 2024-02-05 DIAGNOSIS — R10.9 LEFT SIDED ABDOMINAL PAIN: Primary | ICD-10-CM

## 2024-02-05 LAB
ALBUMIN SERPL-MCNC: 3.7 G/DL (ref 3.5–5)
ALBUMIN/GLOB SERPL: 0.9 (ref 0.4–1.6)
ALP SERPL-CCNC: 73 U/L (ref 50–136)
ALT SERPL-CCNC: 18 U/L (ref 12–65)
ANION GAP SERPL CALC-SCNC: 0 MMOL/L (ref 2–11)
AST SERPL-CCNC: 13 U/L (ref 15–37)
BASOPHILS # BLD: 0 K/UL (ref 0–0.2)
BASOPHILS NFR BLD: 0 % (ref 0–2)
BILIRUB SERPL-MCNC: 0.4 MG/DL (ref 0.2–1.1)
BILIRUB UR QL: NEGATIVE
BUN SERPL-MCNC: 19 MG/DL (ref 6–23)
CALCIUM SERPL-MCNC: 9.4 MG/DL (ref 8.3–10.4)
CHLORIDE SERPL-SCNC: 107 MMOL/L (ref 103–113)
CO2 SERPL-SCNC: 31 MMOL/L (ref 21–32)
CREAT SERPL-MCNC: 1 MG/DL (ref 0.8–1.5)
DIFFERENTIAL METHOD BLD: ABNORMAL
EOSINOPHIL # BLD: 0.2 K/UL (ref 0–0.8)
EOSINOPHIL NFR BLD: 2 % (ref 0.5–7.8)
ERYTHROCYTE [DISTWIDTH] IN BLOOD BY AUTOMATED COUNT: 13.3 % (ref 11.9–14.6)
GLOBULIN SER CALC-MCNC: 4 G/DL (ref 2.8–4.5)
GLUCOSE SERPL-MCNC: 105 MG/DL (ref 65–100)
GLUCOSE UR QL STRIP.AUTO: NEGATIVE MG/DL
HCT VFR BLD AUTO: 46 % (ref 41.1–50.3)
HGB BLD-MCNC: 14.9 G/DL (ref 13.6–17.2)
IMM GRANULOCYTES # BLD AUTO: 0.2 K/UL (ref 0–0.5)
IMM GRANULOCYTES NFR BLD AUTO: 2 % (ref 0–5)
KETONES UR-MCNC: NEGATIVE MG/DL
LEUKOCYTE ESTERASE UR QL STRIP: NEGATIVE
LIPASE SERPL-CCNC: 136 U/L (ref 73–393)
LYMPHOCYTES # BLD: 1.3 K/UL (ref 0.5–4.6)
LYMPHOCYTES NFR BLD: 8 % (ref 13–44)
MCH RBC QN AUTO: 29.4 PG (ref 26.1–32.9)
MCHC RBC AUTO-ENTMCNC: 32.4 G/DL (ref 31.4–35)
MCV RBC AUTO: 90.9 FL (ref 82–102)
MONOCYTES # BLD: 0.9 K/UL (ref 0.1–1.3)
MONOCYTES NFR BLD: 6 % (ref 4–12)
NEUTS SEG # BLD: 12.5 K/UL (ref 1.7–8.2)
NEUTS SEG NFR BLD: 82 % (ref 43–78)
NITRITE UR QL: NEGATIVE
NRBC # BLD: 0 K/UL (ref 0–0.2)
PH UR: 7 (ref 5–9)
PLATELET # BLD AUTO: 402 K/UL (ref 150–450)
PMV BLD AUTO: 9.3 FL (ref 9.4–12.3)
POTASSIUM SERPL-SCNC: 4.9 MMOL/L (ref 3.5–5.1)
PROT SERPL-MCNC: 7.7 G/DL (ref 6.3–8.2)
PROT UR QL: NEGATIVE MG/DL
RBC # BLD AUTO: 5.06 M/UL (ref 4.23–5.6)
RBC # UR STRIP: ABNORMAL
SERVICE CMNT-IMP: ABNORMAL
SODIUM SERPL-SCNC: 138 MMOL/L (ref 136–146)
SP GR UR: 1.02 (ref 1–1.02)
UROBILINOGEN UR QL: 0.2 EU/DL (ref 0.2–1)
WBC # BLD AUTO: 15.2 K/UL (ref 4.3–11.1)

## 2024-02-05 PROCEDURE — 99285 EMERGENCY DEPT VISIT HI MDM: CPT

## 2024-02-05 PROCEDURE — 99024 POSTOP FOLLOW-UP VISIT: CPT | Performed by: ORTHOPAEDIC SURGERY

## 2024-02-05 PROCEDURE — 83690 ASSAY OF LIPASE: CPT

## 2024-02-05 PROCEDURE — 74177 CT ABD & PELVIS W/CONTRAST: CPT

## 2024-02-05 PROCEDURE — 81003 URINALYSIS AUTO W/O SCOPE: CPT

## 2024-02-05 PROCEDURE — 85025 COMPLETE CBC W/AUTO DIFF WBC: CPT

## 2024-02-05 PROCEDURE — 6360000004 HC RX CONTRAST MEDICATION: Performed by: EMERGENCY MEDICINE

## 2024-02-05 PROCEDURE — 2580000003 HC RX 258: Performed by: EMERGENCY MEDICINE

## 2024-02-05 PROCEDURE — 80053 COMPREHEN METABOLIC PANEL: CPT

## 2024-02-05 PROCEDURE — 96374 THER/PROPH/DIAG INJ IV PUSH: CPT

## 2024-02-05 PROCEDURE — 96376 TX/PRO/DX INJ SAME DRUG ADON: CPT

## 2024-02-05 PROCEDURE — 6360000002 HC RX W HCPCS: Performed by: EMERGENCY MEDICINE

## 2024-02-05 RX ORDER — MORPHINE SULFATE 4 MG/ML
4 INJECTION INTRAVENOUS ONCE
Status: COMPLETED | OUTPATIENT
Start: 2024-02-05 | End: 2024-02-05

## 2024-02-05 RX ORDER — 0.9 % SODIUM CHLORIDE 0.9 %
1000 INTRAVENOUS SOLUTION INTRAVENOUS
Status: COMPLETED | OUTPATIENT
Start: 2024-02-05 | End: 2024-02-05

## 2024-02-05 RX ORDER — POLYETHYLENE GLYCOL 3350 17 G/17G
17 POWDER, FOR SOLUTION ORAL DAILY
Qty: 116 G | Refills: 0 | Status: SHIPPED | OUTPATIENT
Start: 2024-02-05 | End: 2024-02-10

## 2024-02-05 RX ADMIN — MORPHINE SULFATE 4 MG: 4 INJECTION INTRAVENOUS at 11:06

## 2024-02-05 RX ADMIN — MORPHINE SULFATE 4 MG: 4 INJECTION INTRAVENOUS at 13:05

## 2024-02-05 RX ADMIN — SODIUM CHLORIDE 1000 ML: 9 INJECTION, SOLUTION INTRAVENOUS at 11:14

## 2024-02-05 RX ADMIN — IOPAMIDOL 100 ML: 755 INJECTION, SOLUTION INTRAVENOUS at 12:03

## 2024-02-05 ASSESSMENT — PAIN DESCRIPTION - DESCRIPTORS
DESCRIPTORS: ACHING
DESCRIPTORS: SHARP;SHOOTING
DESCRIPTORS: SHARP

## 2024-02-05 ASSESSMENT — PAIN SCALES - GENERAL
PAINLEVEL_OUTOF10: 8
PAINLEVEL_OUTOF10: 10
PAINLEVEL_OUTOF10: 8
PAINLEVEL_OUTOF10: 6
PAINLEVEL_OUTOF10: 10

## 2024-02-05 ASSESSMENT — PAIN DESCRIPTION - LOCATION
LOCATION: ABDOMEN
LOCATION: ABDOMEN;BACK
LOCATION: ABDOMEN

## 2024-02-05 ASSESSMENT — PAIN DESCRIPTION - ORIENTATION
ORIENTATION: LEFT

## 2024-02-05 ASSESSMENT — PAIN - FUNCTIONAL ASSESSMENT: PAIN_FUNCTIONAL_ASSESSMENT: 0-10

## 2024-02-05 NOTE — PROGRESS NOTES
Name: Jesus Walters  YOB: 1970  Gender: male  MRN: [id number]  Age: 53 y.o.    Chief Complaint: Lumbar spine surgery follow up    History of Present Illness:      Jesus Walters  is here for 4 week follow up of his   thoracolumbar fusion  surgery.  He has been struggling to mobilize due to abdominal pain.  He reports a lot of swelling in the left side of his lower abdomen.  He reports he has been having bowel movements.  He does have some radicular symptoms in his left leg.     Medications:       Current Outpatient Medications:     cyclobenzaprine (FLEXERIL) 5 MG tablet, Take 1 tablet by mouth 3 times daily as needed for Muscle spasms, Disp: 30 tablet, Rfl: 0    diclofenac sodium (VOLTAREN) 1 % GEL, Apply 4 g topically 4 times daily as needed for Pain (LBP), Disp: , Rfl:     meloxicam (MOBIC) 15 MG tablet, Take 1 tablet by mouth daily, Disp: , Rfl:     acetaminophen (TYLENOL) 500 MG tablet, Take 1 tablet by mouth every 6 hours as needed for Pain, Disp: , Rfl:     lisinopril (PRINIVIL;ZESTRIL) 40 MG tablet, Take 1 tablet by mouth daily, Disp: , Rfl:     Allergies:  Allergies   Allergen Reactions    Latex Hives         Physical Exam:      Respirations are unlabored and there is no evidence of cyanosis      Wound is healed    Gait is  not observed    Sensory testing reveals intact sensation to light touch and in the distribution of the L3-S1 dermatomes bilaterally with the exception of subjective paresthesias in the right lateral thigh.    He does have some abdominal swelling and tenderness to palpation on the left side.    Strength testing in the lower extremity reveals the following based on the 5 point grading scale:       HF (L2) H Ab (L5) KE (L3/4) ADF (L4) EHL (L5) A Ev (S1) APF (S1)   Right 5 5 5 5 5 5 5   Left 5 5 5 5 5 5 5        Radiographic Studies:     X-rays including AP and lateral views of the lumbar spine were reviewed and interpreted:     Postoperative changes are noted

## 2024-02-05 NOTE — DISCHARGE INSTRUCTIONS
As we discussed, the exact cause of your abdominal pain is unclear.  Because of this you should have a low threshold to return should your symptoms worsen or change in any way.  Otherwise follow-up with your primary care doctor in the next week for reevaluation to ensure you are improving.  Take the MiraLAX as needed for constipation

## 2024-02-05 NOTE — ED TRIAGE NOTES
Pt arrives to ER via EMS from MD office - c/o abd pain and swelling in LLQ for 8 days. MD referred patient to ER to be evaluated.  Pain level is 10/10. Recent spinal fusion surgery on January 3rd, 2024.     VS  104/76  HR 50s  RR 20  Unable to get accurate 02 (poor perfusion)  Temp 97.6

## 2024-02-05 NOTE — ED PROVIDER NOTES
MG/DL    Total Bilirubin 0.4 0.2 - 1.1 MG/DL    ALT 18 12 - 65 U/L    AST 13 (L) 15 - 37 U/L    Alk Phosphatase 73 50 - 136 U/L    Total Protein 7.7 6.3 - 8.2 g/dL    Albumin 3.7 3.5 - 5.0 g/dL    Globulin 4.0 2.8 - 4.5 g/dL    Albumin/Globulin Ratio 0.9 0.4 - 1.6     Lipase   Result Value Ref Range    Lipase 136 73 - 393 U/L   POCT Urinalysis no Micro   Result Value Ref Range    Specific Gravity, Urine, POC 1.025 (H) 1.001 - 1.023      pH, Urine, POC 7.0 5.0 - 9.0      Protein, Urine, POC Negative NEG mg/dL    Glucose, UA POC Negative NEG mg/dL    Ketones, Urine, POC Negative NEG mg/dL    Bilirubin, Urine, POC Negative NEG      Blood, UA POC Trace Intact (A) NEG      URINE UROBILINOGEN POC 0.2 0.2 - 1.0 EU/dL    Nitrite, Urine, POC Negative NEG      Leukocyte Est, UA POC Negative NEG      Performed by: Christi Gibson    Results for orders placed or performed in visit on 02/05/24   XR LUMBAR SPINE (2-3 VIEWS)    Narrative    AUTOMATIC ADMINISTRATIVE RESULT    The result for this exam can be found in the Progress note in the chart.      Impression    See Progress note in the chart.        CT ABDOMEN PELVIS W IV CONTRAST Additional Contrast? None   Final Result      No acute findings in the abdomen or pelvis.      Specifically, no evidence of acute diverticulitis. Left lower quadrant is clear.      Appendix is not enlarged.      No mass, adenopathy, or abnormal fluid collections.      No free fluid or inflammatory changes.      No gallstones or biliary ductal dilatation.      No hydronephrosis. No obstructive uropathy or renal stones.      No mechanical bowel obstruction. No inflammatory or ischemic bowel changes.      No free fluid or free air.      Remainder of findings as above.         If there are any questions about this report, I can be reached on [employer]   or at [phone]                           Voice dictation software was used during the making of this note.  This software is not perfect and

## 2024-02-16 ENCOUNTER — TELEPHONE (OUTPATIENT)
Dept: ORTHOPEDIC SURGERY | Age: 54
End: 2024-02-16

## 2024-02-16 NOTE — TELEPHONE ENCOUNTER
Please call patient, he is having swelling on L side and pain down leg , was checked out , they told it was nerve related,  he is in [place] post accute, rehab

## 2024-02-16 NOTE — TELEPHONE ENCOUNTER
Spoke with patient informed him to increased to qid on gabapentin , and follow up apt with [facility] on Monday

## 2024-02-19 ENCOUNTER — OFFICE VISIT (OUTPATIENT)
Dept: ORTHOPEDIC SURGERY | Age: 54
End: 2024-02-19

## 2024-02-19 DIAGNOSIS — Z98.1 STATUS POST LUMBAR SPINAL ARTHRODESIS: Primary | ICD-10-CM

## 2024-02-19 DIAGNOSIS — T84.9XXA COMPLICATIONS OF INTERNAL ORTHOPEDIC DEVICE, IMPLANT, AND GRAFT (HCC): ICD-10-CM

## 2024-02-19 PROCEDURE — 99024 POSTOP FOLLOW-UP VISIT: CPT | Performed by: ORTHOPAEDIC SURGERY

## 2024-02-19 NOTE — PROGRESS NOTES
Name: Jesus Walters  YOB: 1970  Gender: male  MRN: [id number]  Age: 53 y.o.      Chief Complaint:  Radiating back and leg pain    History of Present Illness:      This gentleman is about 6 weeks out from his thoracolumbar fusion for the L1 fracture.  The patient has been having radiating pain now focused on the left side.  He did go to the emergency department where a CT scan was obtained and he is here to review that study with me today.  He does continue to have radicular symptoms in the left anterior thigh.  That is his predominant complaint.  Overall, his back is feeling better.         Medications:       Current Outpatient Medications:     cyclobenzaprine (FLEXERIL) 5 MG tablet, Take 1 tablet by mouth 3 times daily as needed for Muscle spasms, Disp: 30 tablet, Rfl: 0    acetaminophen (TYLENOL) 500 MG tablet, Take 1 tablet by mouth every 6 hours as needed for Pain, Disp: , Rfl:     lisinopril (PRINIVIL;ZESTRIL) 40 MG tablet, Take 1 tablet by mouth daily, Disp: , Rfl:     Allergies:  Allergies   Allergen Reactions    Latex Hives         Physical Exam:     This is a well developed well nourished male adult.     Mood and affect are appropriate.    Oriented to person, place, and time.    Chest is clear to auscultation.    Heart is regular rate and rhythm.     The patient ambulates with an antalgic and crouched gait.     There is diminished light touch sensation in the  left L3 dermatome including the anterior and medial thigh..    Reflexes   Right Left   Quadriceps (L4) 2 2   Achilles (S1) 2 2     Strength testing in the lower extremity reveals the following based on the 5 point grading scale:     HF (L2) H Ab (L5) KE (L3/4) ADF (L4) EHL (L5) A Ev (S1) APF (S1)   Right 5 5 5 5 5 5 5   Left 4 5 4 5 5 5 5        Radiographic Studies:     X-rays including AP and lateral views of the lumbar spine were reviewed and interpreted: Postoperative changes noted status post T10-L3 fusion.  His fracture

## 2024-02-20 DIAGNOSIS — T84.9XXA COMPLICATIONS OF INTERNAL ORTHOPEDIC DEVICE, IMPLANT, AND GRAFT (HCC): ICD-10-CM

## 2024-02-21 RX ORDER — ACETAMINOPHEN 325 MG/1
1000 TABLET ORAL ONCE
OUTPATIENT
Start: 2024-02-21 | End: 2024-02-21

## 2024-02-23 ENCOUNTER — HOSPITAL ENCOUNTER (OUTPATIENT)
Dept: SURGERY | Age: 54
Discharge: HOME | End: 2024-02-23
Payer: COMMERCIAL

## 2024-02-23 VITALS
OXYGEN SATURATION: 96 % | HEIGHT: 70 IN | WEIGHT: 175 LBS | HEART RATE: 97 BPM | TEMPERATURE: 97.8 F | RESPIRATION RATE: 16 BRPM | BODY MASS INDEX: 25.05 KG/M2 | SYSTOLIC BLOOD PRESSURE: 96 MMHG | DIASTOLIC BLOOD PRESSURE: 74 MMHG

## 2024-02-23 LAB
ANION GAP SERPL CALC-SCNC: 6 MMOL/L (ref 2–11)
APPEARANCE UR: CLEAR
BASOPHILS # BLD: 0.1 K/UL (ref 0–0.2)
BASOPHILS NFR BLD: 1 % (ref 0–2)
BILIRUB UR QL: NEGATIVE
BUN SERPL-MCNC: 12 MG/DL (ref 6–23)
CALCIUM SERPL-MCNC: 9.3 MG/DL (ref 8.3–10.4)
CHLORIDE SERPL-SCNC: 104 MMOL/L (ref 103–113)
CO2 SERPL-SCNC: 29 MMOL/L (ref 21–32)
COLOR UR: NORMAL
CREAT SERPL-MCNC: 1.03 MG/DL (ref 0.8–1.5)
DIFFERENTIAL METHOD BLD: ABNORMAL
EOSINOPHIL # BLD: 0.5 K/UL (ref 0–0.8)
EOSINOPHIL NFR BLD: 5 % (ref 0.5–7.8)
ERYTHROCYTE [DISTWIDTH] IN BLOOD BY AUTOMATED COUNT: 13.3 % (ref 11.9–14.6)
GLUCOSE SERPL-MCNC: 112 MG/DL (ref 65–100)
GLUCOSE UR STRIP.AUTO-MCNC: NEGATIVE MG/DL
HCT VFR BLD AUTO: 43.9 % (ref 41.1–50.3)
HGB BLD-MCNC: 14.6 G/DL (ref 13.6–17.2)
HGB UR QL STRIP: NEGATIVE
IMM GRANULOCYTES # BLD AUTO: 0.1 K/UL (ref 0–0.5)
IMM GRANULOCYTES NFR BLD AUTO: 1 % (ref 0–5)
KETONES UR QL STRIP.AUTO: NEGATIVE MG/DL
LEUKOCYTE ESTERASE UR QL STRIP.AUTO: NEGATIVE
LYMPHOCYTES # BLD: 1.8 K/UL (ref 0.5–4.6)
LYMPHOCYTES NFR BLD: 16 % (ref 13–44)
MCH RBC QN AUTO: 29.5 PG (ref 26.1–32.9)
MCHC RBC AUTO-ENTMCNC: 33.3 G/DL (ref 31.4–35)
MCV RBC AUTO: 88.7 FL (ref 82–102)
MONOCYTES # BLD: 0.5 K/UL (ref 0.1–1.3)
MONOCYTES NFR BLD: 5 % (ref 4–12)
NEUTS SEG # BLD: 8.6 K/UL (ref 1.7–8.2)
NEUTS SEG NFR BLD: 74 % (ref 43–78)
NITRITE UR QL STRIP.AUTO: NEGATIVE
NRBC # BLD: 0 K/UL (ref 0–0.2)
PH UR STRIP: 5.5 (ref 5–9)
PLATELET # BLD AUTO: 432 K/UL (ref 150–450)
PMV BLD AUTO: 9.9 FL (ref 9.4–12.3)
POTASSIUM SERPL-SCNC: 4.8 MMOL/L (ref 3.5–5.1)
PROT UR STRIP-MCNC: NEGATIVE MG/DL
RBC # BLD AUTO: 4.95 M/UL (ref 4.23–5.6)
SODIUM SERPL-SCNC: 139 MMOL/L (ref 136–146)
SP GR UR REFRACTOMETRY: 1.01 (ref 1–1.02)
UROBILINOGEN UR QL STRIP.AUTO: 0.2 EU/DL (ref 0.2–1)
WBC # BLD AUTO: 11.6 K/UL (ref 4.3–11.1)

## 2024-02-23 PROCEDURE — 85025 COMPLETE CBC W/AUTO DIFF WBC: CPT

## 2024-02-23 PROCEDURE — 81003 URINALYSIS AUTO W/O SCOPE: CPT

## 2024-02-23 PROCEDURE — 80048 BASIC METABOLIC PNL TOTAL CA: CPT

## 2024-02-23 PROCEDURE — 87641 MR-STAPH DNA AMP PROBE: CPT

## 2024-02-23 RX ORDER — GABAPENTIN 300 MG/1
300 CAPSULE ORAL 3 TIMES DAILY PRN
COMMUNITY

## 2024-02-23 RX ORDER — OXYCODONE HYDROCHLORIDE 5 MG/1
5 TABLET ORAL EVERY 6 HOURS PRN
COMMUNITY
Start: 2024-02-23 | End: 2024-02-27

## 2024-02-23 ASSESSMENT — PAIN DESCRIPTION - LOCATION: LOCATION: BACK;HIP;KNEE;LEG

## 2024-02-23 ASSESSMENT — PAIN DESCRIPTION - DESCRIPTORS: DESCRIPTORS: ACHING;CRUSHING

## 2024-02-23 ASSESSMENT — PAIN DESCRIPTION - PAIN TYPE: TYPE: ACUTE PAIN

## 2024-02-23 ASSESSMENT — PAIN SCALES - GENERAL: PAINLEVEL_OUTOF10: 9

## 2024-02-23 ASSESSMENT — PAIN DESCRIPTION - FREQUENCY: FREQUENCY: CONTINUOUS

## 2024-02-23 ASSESSMENT — PAIN DESCRIPTION - ORIENTATION: ORIENTATION: LEFT

## 2024-02-23 NOTE — PERIOP NOTE
PLEASE CONTINUE TAKING ALL PRESCRIPTION MEDICATIONS UP TO THE DAY OF SURGERY UNLESS OTHERWISE DIRECTED BELOW.      Take ONLY the following medications on the day of surgery:    Gabapentin   oxycodone            Hold the following medications as specified:      DISCONTINUE all vitamins and supplements 7 days prior to surgery.     DISCONTINUE Non-Steriodal Anti-Inflammatory (NSAIDS) such as Aspirin, Advil and Aleve 5 days prior to surgery.      Comments       3/5/24  the day before surgery please take Tylenol (Acetaminophen) 1000mg in the morning and then again before bed OR you may substitute for Tylenol 650 mg morning, noon, and evening.          Please do not bring home medications with you on the day of surgery unless otherwise directed by your nurse.  If you are instructed to bring home medications, please give them to your nurse as they will be administered by the nursing staff.    If you have any questions, please call [facility] [phone] or [facility] [phone].    A copy of this note was provided to the patient for reference.

## 2024-02-23 NOTE — PERIOP NOTE
Patient verified name & . Order to obtain consent found in EHR and matches case posting.    TYPE  CASE:3              Orders:  received  Labs per Spine protocol:  CBC with dif, BMP, UA and MRSA/SA nasal swab - labs processing and marked for charge nurse review. Labs per anesthesia protocol: T&S on DOS  EKG/cardiac records  :  not indicated  Glucose: not indicated    Medication list updated today. Pt did not bring medication bottles or updated list today, but provided list of medications from memory to the best of their ability. Pt answered medical and surgical history questions to the best of their ability.     All questions were addressed. Spine Recovery booklet provided and surgery handouts. Surgery instructions provided to pt and pt verbalizes understanding.    Patient Guide to Surgery Packet provided to patient. Packet includes Patient Guide to surgery handout, Preventing Infections Before & After Surgery, Facts about Pain Management handout, Hand Hygiene handout, Patient Education and Teaching Sheets and [facility] frequent question and answer sheet. Guide reviewed with the patient and all questions answered to the satisfaction of the patient.     Patient instructed on the following and verbalized understanding:  Arrive at MAIN entrance @ [address] [place] [address]  Same day outpatient surgery only: Responsible adult must drive patient to and from hospital, stay during surgery and 24 hours postoperatively.  Npo after midnight including gum, mints and ice chips.  Shower using antiseptic wash both the night before and the morning of surgery. Antiseptic wash provided to the patient with handout and verbal instructions for use.  Leave all valuables at home. Instructed on no jewelry or body piercings on the dos.  Bring insurance card and ID.  No perfumes, oil, powder, colognes, makeup or  lotions on the skin. No nail polish.  Artificial nails are not permitted.  Please bring the  following that apply: CPAP or Bi-Pap, portable oxygen, rescue inhalers, remote for spinal cord stimulator or any electronic remote implant, and post-operative supplies such as cold therapy pad, sling, brace, shoe or boot as it applies to your case.    You may be required to pay a deductible or co-pay on the day of your procedure. You can pre-pay by calling [phone] if your surgery is at the [facility] or [phone] if your surgery is at the [facility].    You will received a call from the pre-op nurse by 5 pm on the business day prior to the scheduled procedure. If you have not spoken with a nurse, please check your voicemail. If you have not received an arrival time by 5 pm, please call [phone].    Patient verbalized understanding of all instructions and provided all medical/health information to the best of their ability.

## 2024-02-24 LAB
MRSA DNA SPEC QL NAA+PROBE: DETECTED
S AUREUS CPE NOSE QL NAA+PROBE: DETECTED

## 2024-02-26 NOTE — PROGRESS NOTES
labs within anesthesia guidelines, no follow-up required. Labs automatically routed to ordering provider via Epic documentation.

## 2024-02-26 NOTE — PROGRESS NOTES
Pre-Assessment Surgery Review      Patient ID:  Jesus Walters  [id number]  53 y.o.  1970  Surgeon: Dr Hines  Date of Surgery: 3/6/2024  Procedure:   T10-L3 reinstrumentation.   Primary Care Physician: Ede Casillas Jr., [phone]  Specialty Physician(s):      Subjective:   Jesus Walters is a 53 y.o. White (non-) male who presents for preoperative evaluation. This is a preoperative chart review note based on data collected by the nurse at the surgical Pre-Assessment visit.    Past Medical History:   Diagnosis Date    Androgen deficiency     Chronic back pain     Complications of internal orthopedic device, implant, and graft (HCC) 02/23/2024    Depression with anxiety     Dermatophytosis of nail     Discitis of lumbar region     Elevated PSA     Biopsy negative, active surveillence Dr Teixeira    History of echocardiogram     Echo Stress 2021  LVEF 65%- mild-moderate MVP    History of gastroesophageal reflux (GERD)     well controlled w/o meds since 25 lb weightloss    History of Lyme disease 2006    Hyperlipidemia     Hypertension     Major depression     Male hypogonadism     Mitral valve prolapse     Echo Stress 2021 LVEF 65%- mild-moderate MVP    Mitral valve regurgitation     Echo Stress 2021 LVEF 65%- mild-moderate MVP    Obstructive sleep apnea     2/23/24 does not use CPAP, states has lost 25 lbs recently and can not tolerate due to back pain and sleeping position    Prostate enlargement       Past Surgical History:   Procedure Laterality Date    COLONOSCOPY      ESOPHAGEAL DILATATION      LUMBAR FUSION N/A 01/03/2024    L1 Laminectomy and T10-L3 fusion with allograft and instrumentation performed by Luis Alberto Hines MD at [facility] MAIN OR    LUMBAR LAMINECTOMY  2003    L3-4 laminectomy/discectomy    PROSTATE BIOPSY  2021     Family History   Problem Relation Age of Onset    Other Mother         colitis    Macular Degen Mother     Thyroid Cancer Neg Hx     Cancer

## 2024-02-27 ENCOUNTER — APPOINTMENT (OUTPATIENT)
Dept: CT IMAGING | Age: 54
End: 2024-02-27
Payer: COMMERCIAL

## 2024-02-27 ENCOUNTER — APPOINTMENT (OUTPATIENT)
Dept: GENERAL RADIOLOGY | Age: 54
End: 2024-02-27
Payer: COMMERCIAL

## 2024-02-27 ENCOUNTER — APPOINTMENT (OUTPATIENT)
Dept: MRI IMAGING | Age: 54
End: 2024-02-27
Payer: COMMERCIAL

## 2024-02-27 ENCOUNTER — TELEPHONE (OUTPATIENT)
Dept: ORTHOPEDIC SURGERY | Age: 54
End: 2024-02-27

## 2024-02-27 ENCOUNTER — HOSPITAL ENCOUNTER (EMERGENCY)
Age: 54
Discharge: HOME | End: 2024-02-27
Attending: EMERGENCY MEDICINE
Payer: COMMERCIAL

## 2024-02-27 VITALS
DIASTOLIC BLOOD PRESSURE: 90 MMHG | HEART RATE: 97 BPM | BODY MASS INDEX: 25.05 KG/M2 | TEMPERATURE: 100 F | HEIGHT: 70 IN | OXYGEN SATURATION: 96 % | WEIGHT: 175 LBS | SYSTOLIC BLOOD PRESSURE: 137 MMHG | RESPIRATION RATE: 21 BRPM

## 2024-02-27 DIAGNOSIS — G89.29 CHRONIC MIDLINE LOW BACK PAIN WITH LEFT-SIDED SCIATICA: Primary | ICD-10-CM

## 2024-02-27 DIAGNOSIS — D72.829 LEUKOCYTOSIS, UNSPECIFIED TYPE: ICD-10-CM

## 2024-02-27 DIAGNOSIS — M54.42 CHRONIC MIDLINE LOW BACK PAIN WITH LEFT-SIDED SCIATICA: Primary | ICD-10-CM

## 2024-02-27 DIAGNOSIS — R79.82 ELEVATED C-REACTIVE PROTEIN (CRP): ICD-10-CM

## 2024-02-27 PROBLEM — R53.1 WEAKNESS: Status: ACTIVE | Noted: 2024-01-11

## 2024-02-27 PROBLEM — M62.81 MUSCLE WEAKNESS (GENERALIZED): Status: ACTIVE | Noted: 2024-01-11

## 2024-02-27 PROBLEM — F41.9 ANXIETY AND DEPRESSION: Status: ACTIVE | Noted: 2024-01-13

## 2024-02-27 PROBLEM — K59.01 SLOW TRANSIT CONSTIPATION: Status: ACTIVE | Noted: 2024-01-29

## 2024-02-27 PROBLEM — M62.838 MUSCLE SPASM: Status: ACTIVE | Noted: 2024-01-24

## 2024-02-27 PROBLEM — R53.81 DEBILITY: Status: ACTIVE | Noted: 2024-01-13

## 2024-02-27 PROBLEM — I10 PRIMARY HYPERTENSION: Status: ACTIVE | Noted: 2024-01-11

## 2024-02-27 PROBLEM — R52 PAIN: Status: ACTIVE | Noted: 2024-02-06

## 2024-02-27 PROBLEM — F32.A ANXIETY AND DEPRESSION: Status: ACTIVE | Noted: 2024-01-13

## 2024-02-27 PROBLEM — K92.1 BLOOD IN STOOL: Status: ACTIVE | Noted: 2023-05-18

## 2024-02-27 PROBLEM — R26.2 DIFFICULTY IN WALKING, NOT ELSEWHERE CLASSIFIED: Status: ACTIVE | Noted: 2024-01-11

## 2024-02-27 PROBLEM — R10.32 LEFT LOWER QUADRANT ABDOMINAL PAIN: Status: ACTIVE | Noted: 2024-02-06

## 2024-02-27 PROBLEM — S32.012D: Status: ACTIVE | Noted: 2024-01-11

## 2024-02-27 PROBLEM — E66.9 OBESITY (BMI 30-39.9): Status: ACTIVE | Noted: 2024-01-13

## 2024-02-27 PROBLEM — M48.062 SPINAL STENOSIS OF LUMBAR REGION WITH NEUROGENIC CLAUDICATION: Status: ACTIVE | Noted: 2024-01-11

## 2024-02-27 PROBLEM — R13.14 PHARYNGOESOPHAGEAL DYSPHAGIA: Status: ACTIVE | Noted: 2023-05-18

## 2024-02-27 PROBLEM — S32.010K: Status: ACTIVE | Noted: 2024-01-13

## 2024-02-27 LAB
ALBUMIN SERPL-MCNC: 4.2 G/DL (ref 3.5–5)
ALBUMIN/GLOB SERPL: 0.9 (ref 0.4–1.6)
ALP SERPL-CCNC: 77 U/L (ref 50–136)
ALT SERPL-CCNC: 19 U/L (ref 12–65)
ANION GAP SERPL CALC-SCNC: 8 MMOL/L (ref 2–11)
APPEARANCE UR: CLEAR
AST SERPL-CCNC: 15 U/L (ref 15–37)
BASOPHILS # BLD: 0.1 K/UL (ref 0–0.2)
BASOPHILS NFR BLD: 1 % (ref 0–2)
BILIRUB SERPL-MCNC: 0.7 MG/DL (ref 0.2–1.1)
BILIRUB UR QL: NEGATIVE
BUN SERPL-MCNC: 11 MG/DL (ref 6–23)
CALCIUM SERPL-MCNC: 10.3 MG/DL (ref 8.3–10.4)
CHLORIDE SERPL-SCNC: 108 MMOL/L (ref 103–113)
CO2 SERPL-SCNC: 25 MMOL/L (ref 21–32)
COLOR UR: ABNORMAL
CREAT SERPL-MCNC: 0.9 MG/DL (ref 0.8–1.5)
CRP SERPL-MCNC: 1.3 MG/DL (ref 0–0.9)
DIFFERENTIAL METHOD BLD: ABNORMAL
EOSINOPHIL # BLD: 0.4 K/UL (ref 0–0.8)
EOSINOPHIL NFR BLD: 3 % (ref 0.5–7.8)
ERYTHROCYTE [DISTWIDTH] IN BLOOD BY AUTOMATED COUNT: 13.2 % (ref 11.9–14.6)
ERYTHROCYTE [SEDIMENTATION RATE] IN BLOOD: 14 MM/HR
GLOBULIN SER CALC-MCNC: 4.6 G/DL (ref 2.8–4.5)
GLUCOSE SERPL-MCNC: 103 MG/DL (ref 65–100)
GLUCOSE UR STRIP.AUTO-MCNC: NEGATIVE MG/DL
HCT VFR BLD AUTO: 49.9 % (ref 41.1–50.3)
HGB BLD-MCNC: 16.6 G/DL (ref 13.6–17.2)
HGB UR QL STRIP: NEGATIVE
IMM GRANULOCYTES # BLD AUTO: 0.1 K/UL (ref 0–0.5)
IMM GRANULOCYTES NFR BLD AUTO: 1 % (ref 0–5)
KETONES UR QL STRIP.AUTO: NEGATIVE MG/DL
LACTATE SERPL-SCNC: 1.1 MMOL/L (ref 0.4–2)
LEUKOCYTE ESTERASE UR QL STRIP.AUTO: NEGATIVE
LYMPHOCYTES # BLD: 1.7 K/UL (ref 0.5–4.6)
LYMPHOCYTES NFR BLD: 12 % (ref 13–44)
MCH RBC QN AUTO: 29 PG (ref 26.1–32.9)
MCHC RBC AUTO-ENTMCNC: 33.3 G/DL (ref 31.4–35)
MCV RBC AUTO: 87.1 FL (ref 82–102)
MONOCYTES # BLD: 0.7 K/UL (ref 0.1–1.3)
MONOCYTES NFR BLD: 5 % (ref 4–12)
NEUTS SEG # BLD: 11.8 K/UL (ref 1.7–8.2)
NEUTS SEG NFR BLD: 78 % (ref 43–78)
NITRITE UR QL STRIP.AUTO: NEGATIVE
NRBC # BLD: 0 K/UL (ref 0–0.2)
PH UR STRIP: 6.5 (ref 5–9)
PLATELET # BLD AUTO: 492 K/UL (ref 150–450)
PMV BLD AUTO: 9.3 FL (ref 9.4–12.3)
POTASSIUM SERPL-SCNC: 4.2 MMOL/L (ref 3.5–5.1)
PROCALCITONIN SERPL-MCNC: 0.07 NG/ML (ref 0–0.49)
PROT SERPL-MCNC: 8.8 G/DL (ref 6.3–8.2)
PROT UR STRIP-MCNC: NEGATIVE MG/DL
RBC # BLD AUTO: 5.73 M/UL (ref 4.23–5.6)
SODIUM SERPL-SCNC: 141 MMOL/L (ref 136–146)
SP GR UR REFRACTOMETRY: >1.035 (ref 1–1.02)
UROBILINOGEN UR QL STRIP.AUTO: 0.2 EU/DL (ref 0.2–1)
WBC # BLD AUTO: 14.8 K/UL (ref 4.3–11.1)

## 2024-02-27 PROCEDURE — 96365 THER/PROPH/DIAG IV INF INIT: CPT

## 2024-02-27 PROCEDURE — 85652 RBC SED RATE AUTOMATED: CPT

## 2024-02-27 PROCEDURE — 87040 BLOOD CULTURE FOR BACTERIA: CPT

## 2024-02-27 PROCEDURE — 85025 COMPLETE CBC W/AUTO DIFF WBC: CPT

## 2024-02-27 PROCEDURE — 6360000004 HC RX CONTRAST MEDICATION

## 2024-02-27 PROCEDURE — 81003 URINALYSIS AUTO W/O SCOPE: CPT

## 2024-02-27 PROCEDURE — 6370000000 HC RX 637 (ALT 250 FOR IP)

## 2024-02-27 PROCEDURE — A9579 GAD-BASE MR CONTRAST NOS,1ML: HCPCS | Performed by: EMERGENCY MEDICINE

## 2024-02-27 PROCEDURE — 6370000000 HC RX 637 (ALT 250 FOR IP): Performed by: EMERGENCY MEDICINE

## 2024-02-27 PROCEDURE — 96375 TX/PRO/DX INJ NEW DRUG ADDON: CPT

## 2024-02-27 PROCEDURE — 6360000002 HC RX W HCPCS

## 2024-02-27 PROCEDURE — 96374 THER/PROPH/DIAG INJ IV PUSH: CPT

## 2024-02-27 PROCEDURE — 86140 C-REACTIVE PROTEIN: CPT

## 2024-02-27 PROCEDURE — 84145 PROCALCITONIN (PCT): CPT

## 2024-02-27 PROCEDURE — 80053 COMPREHEN METABOLIC PANEL: CPT

## 2024-02-27 PROCEDURE — 99285 EMERGENCY DEPT VISIT HI MDM: CPT

## 2024-02-27 PROCEDURE — 2580000003 HC RX 258: Performed by: EMERGENCY MEDICINE

## 2024-02-27 PROCEDURE — 72158 MRI LUMBAR SPINE W/O & W/DYE: CPT

## 2024-02-27 PROCEDURE — 2580000003 HC RX 258

## 2024-02-27 PROCEDURE — 74177 CT ABD & PELVIS W/CONTRAST: CPT

## 2024-02-27 PROCEDURE — 83605 ASSAY OF LACTIC ACID: CPT

## 2024-02-27 PROCEDURE — 71045 X-RAY EXAM CHEST 1 VIEW: CPT

## 2024-02-27 PROCEDURE — 6360000004 HC RX CONTRAST MEDICATION: Performed by: EMERGENCY MEDICINE

## 2024-02-27 RX ORDER — KETOROLAC TROMETHAMINE 30 MG/ML
30 INJECTION, SOLUTION INTRAMUSCULAR; INTRAVENOUS
Status: COMPLETED | OUTPATIENT
Start: 2024-02-27 | End: 2024-02-27

## 2024-02-27 RX ORDER — ONDANSETRON 4 MG/1
4 TABLET, ORALLY DISINTEGRATING ORAL
Status: COMPLETED | OUTPATIENT
Start: 2024-02-27 | End: 2024-02-27

## 2024-02-27 RX ORDER — DEXAMETHASONE SODIUM PHOSPHATE 10 MG/ML
10 INJECTION INTRAMUSCULAR; INTRAVENOUS ONCE
Status: COMPLETED | OUTPATIENT
Start: 2024-02-27 | End: 2024-02-27

## 2024-02-27 RX ORDER — OXYCODONE HYDROCHLORIDE 5 MG/1
10 TABLET ORAL
Status: COMPLETED | OUTPATIENT
Start: 2024-02-27 | End: 2024-02-27

## 2024-02-27 RX ORDER — OXYCODONE HYDROCHLORIDE 10 MG/1
10 TABLET ORAL EVERY 6 HOURS PRN
Qty: 8 TABLET | Refills: 0 | Status: SHIPPED | OUTPATIENT
Start: 2024-02-27 | End: 2024-02-29

## 2024-02-27 RX ORDER — SODIUM CHLORIDE 0.9 % (FLUSH) 0.9 %
10 SYRINGE (ML) INJECTION AS NEEDED
Status: DISCONTINUED | OUTPATIENT
Start: 2024-02-27 | End: 2024-02-27 | Stop reason: HOSPADM

## 2024-02-27 RX ORDER — 0.9 % SODIUM CHLORIDE 0.9 %
30 INTRAVENOUS SOLUTION INTRAVENOUS ONCE
Status: COMPLETED | OUTPATIENT
Start: 2024-02-27 | End: 2024-02-27

## 2024-02-27 RX ORDER — ACETAMINOPHEN 500 MG
1000 TABLET ORAL
Status: COMPLETED | OUTPATIENT
Start: 2024-02-27 | End: 2024-02-27

## 2024-02-27 RX ADMIN — ACETAMINOPHEN 1000 MG: 500 TABLET ORAL at 16:30

## 2024-02-27 RX ADMIN — GADOTERIDOL 15 ML: 279.3 INJECTION, SOLUTION INTRAVENOUS at 19:20

## 2024-02-27 RX ADMIN — SODIUM CHLORIDE 2382 ML: 9 INJECTION, SOLUTION INTRAVENOUS at 16:00

## 2024-02-27 RX ADMIN — KETOROLAC TROMETHAMINE 30 MG: 30 INJECTION, SOLUTION INTRAMUSCULAR; INTRAVENOUS at 17:48

## 2024-02-27 RX ADMIN — IOPAMIDOL 100 ML: 755 INJECTION, SOLUTION INTRAVENOUS at 16:07

## 2024-02-27 RX ADMIN — CEFTRIAXONE SODIUM 2000 MG: 2 INJECTION, POWDER, FOR SOLUTION INTRAMUSCULAR; INTRAVENOUS at 15:59

## 2024-02-27 RX ADMIN — OXYCODONE HYDROCHLORIDE 10 MG: 5 TABLET ORAL at 21:15

## 2024-02-27 RX ADMIN — ONDANSETRON 4 MG: 4 TABLET, ORALLY DISINTEGRATING ORAL at 14:24

## 2024-02-27 RX ADMIN — DEXAMETHASONE SODIUM PHOSPHATE 10 MG: 10 INJECTION INTRAMUSCULAR; INTRAVENOUS at 17:49

## 2024-02-27 RX ADMIN — FENTANYL CITRATE 50 MCG: 0.05 INJECTION, SOLUTION INTRAMUSCULAR; INTRAVENOUS at 17:53

## 2024-02-27 RX ADMIN — FENTANYL CITRATE 50 MCG: 0.05 INJECTION, SOLUTION INTRAMUSCULAR; INTRAVENOUS at 15:53

## 2024-02-27 RX ADMIN — SODIUM CHLORIDE, PRESERVATIVE FREE 10 ML: 5 INJECTION INTRAVENOUS at 19:20

## 2024-02-27 ASSESSMENT — PAIN DESCRIPTION - ORIENTATION
ORIENTATION: LOWER
ORIENTATION: RIGHT;LEFT
ORIENTATION: LOWER

## 2024-02-27 ASSESSMENT — PAIN SCALES - GENERAL
PAINLEVEL_OUTOF10: 8
PAINLEVEL_OUTOF10: 8
PAINLEVEL_OUTOF10: 10
PAINLEVEL_OUTOF10: 4
PAINLEVEL_OUTOF10: 8
PAINLEVEL_OUTOF10: 10
PAINLEVEL_OUTOF10: 5

## 2024-02-27 ASSESSMENT — PAIN DESCRIPTION - DESCRIPTORS
DESCRIPTORS: ACHING
DESCRIPTORS: BURNING;POUNDING

## 2024-02-27 ASSESSMENT — PAIN DESCRIPTION - LOCATION
LOCATION: BACK
LOCATION: BACK;LEG
LOCATION: BACK

## 2024-02-27 ASSESSMENT — LIFESTYLE VARIABLES
HOW OFTEN DO YOU HAVE A DRINK CONTAINING ALCOHOL: NEVER
HOW MANY STANDARD DRINKS CONTAINING ALCOHOL DO YOU HAVE ON A TYPICAL DAY: PATIENT DOES NOT DRINK

## 2024-02-27 NOTE — ED NOTES
MRI staff states they can complete screening form up in MRI. Patient prepped and sent to MRI.      Christi Mg, RN  02/27/24 5938

## 2024-02-27 NOTE — ED TRIAGE NOTES
Pt arrives via [facility] from [facility]. Pt complains of back pain. Reports back surgery January of this year. States pain has been chronic since then. Reports is due for another back surgery next week. Denies any new fall or injury.

## 2024-02-27 NOTE — ED NOTES
Pt states he cannot pee at this time. Patient provided with urinal and says he will try.      Christi Mg, YI  02/27/24 5435

## 2024-02-28 NOTE — ED NOTES
[facility] to [address] [place] [address] in [place]. ETA 10:10 PM.     Andrey Pitt  02/27/24 2142

## 2024-02-28 NOTE — ED PROVIDER NOTES
ED ATTENDING SHARED SERVICES NOTE:     I have seen and evaluated the patient and performed an independent history and physical exam, and I agree with the assessment and plan as documented in the advanced provider note.  I performed the history of present illness and physical exam in its entirety.  With the following updates: MRI negative.  Patient with recent fusion in the back.  Also history of discitis with increasing back pain.  States he has to have another surgery regarding some hardware issues next week.  On exam, pain with movement.  Especially movement of left leg.  Will increase pain medication for 48 hours outpatient follow-up with orthopedics      The management of this patient was discussed with an external consultant.      Exclusion criteria - the patient is NOT to be included for SEP-1 Core Measure due to: Viral etiology found or highly suspected (including COVID-19) without concomitant bacterial infection          Wai Vizcarra MD  02/27/24 8177    
move a screw that could be contributing to the left leg.    Due to patient's elevated temperature and tachycardia, will obtain lab work.  Including a sed rate  Patient noted to have white count of 14.8.  On add on a lactic acid blood cultures and a Pro-Harry will administer IV fluids, IV ceftriaxone.    Considering patient is still having abdominal discomfort, will obtain CT abdomen pelvis with IV contrast to evaluate for any changing intra-abdominal etiology.  Will evaluate for any changes within the lumbar spine.  No acute findings within CT abdomen pelvis.  Unchanged from prior imaging.        Discussed this patient with ER attending, Dr. Vizcarra regarding recent surgery, no findings on CT imaging.  It has been decided that we will go ahead and get an MRI of lumbar spine covering incision site to evaluate for any possible epidural abscess as we cannot find a source of infection.    Patient is in agreement with this plan.  He has received multiple doses of IV fentanyl for pain control.  Appears to be resting comfortably on stretcher.     urinalysis is grossly within normal limits.  I have added on a  Portable chest as well to make sure he does not have any postoperative pneumonia that I do not hear on auscultation.  Patient has been able to void here in the emergency department.  Postvoid residual 100 mL.  He is easily able to fill the urinal.    Lower suspicion for cauda equina at this time        Rest of patient's lab work is reassuring.  Sed rate is within normal limits.  Pro-Harry is within normal limits.  No concerning findings on CMP.  See ED course for comments    Initial lactic acid and blood culture could not be found by lab although it was sent up.  These samples had to be redrawn.    Lactic acid within normal limits at 1.1  CRP is elevated at 1.3    7:10 PM EST The patient's care will be transitioned to ER attending, Dr. Vizcarra.  At this time, the plan is to follow-up on portable chest x-ray and MRI imaging.  I

## 2024-02-28 NOTE — ED NOTES
I have reviewed discharge instructions with the patient.  The patient verbalized understanding.    Patient left ED via Discharge Method: ambulatory to Home    Opportunity for questions and clarification provided.       Patient given 1 scripts.         To continue your aftercare when you leave the hospital, you may receive an automated call from our care team to check in on how you are doing.  This is a free service and part of our promise to provide the best care and service to meet your aftercare needs.” If you have questions, or wish to unsubscribe from this service please call [phone].  Thank you for Choosing our [facility] Emergency Department.        Yinka Abarca, RN  02/27/24 6128

## 2024-02-28 NOTE — DISCHARGE INSTRUCTIONS
Continue to rest.  Change her oxycodone to 10 mg every 6 hours for the next 2 days.  Call your back surgeon's office tomorrow for further advice regarding recheck or other options prior to your surgery next week.  Recheck sooner for fever goes higher heart or other concerns.

## 2024-02-29 ENCOUNTER — TELEPHONE (OUTPATIENT)
Dept: ORTHOPEDIC SURGERY | Age: 54
End: 2024-02-29

## 2024-02-29 DIAGNOSIS — Z98.1 STATUS POST LUMBAR SPINAL ARTHRODESIS: Primary | ICD-10-CM

## 2024-02-29 RX ORDER — GABAPENTIN 300 MG/1
300 CAPSULE ORAL 3 TIMES DAILY
Qty: 90 CAPSULE | Refills: 0 | Status: SHIPPED | OUTPATIENT
Start: 2024-02-29 | End: 2024-03-30

## 2024-02-29 RX ORDER — OXYCODONE HYDROCHLORIDE 10 MG/1
10 TABLET ORAL EVERY 6 HOURS PRN
Qty: 28 TABLET | Refills: 0 | Status: SHIPPED | OUTPATIENT
Start: 2024-02-29 | End: 2024-03-07

## 2024-02-29 NOTE — TELEPHONE ENCOUNTER
Pt went to the ER on 02/27 for his back and scheduled for 03/04. He wants to speak to MA before then because he's in a lot of pain. MRI was done while in the ER. Please call pt?

## 2024-02-29 NOTE — TELEPHONE ENCOUNTER
Dr Desir reviewed his MRI and spoke with patient discussed results. He will send in prescriptions for oxy 10 and gabapentin to try to control pain until surgery.

## 2024-03-03 LAB
BACTERIA SPEC CULT: NORMAL
BACTERIA SPEC CULT: NORMAL
SERVICE CMNT-IMP: NORMAL
SERVICE CMNT-IMP: NORMAL

## 2024-03-04 ENCOUNTER — TELEPHONE (OUTPATIENT)
Dept: ORTHOPEDIC SURGERY | Age: 54
End: 2024-03-04

## 2024-03-04 NOTE — TELEPHONE ENCOUNTER
He calls with concerns of this being outpatient surgery. I did inform him that he is scheduled outpatient overnight stay with bed. He could be there a couple of days depending on his recovery. He was instructed to call back with any additional questions

## 2024-03-05 ENCOUNTER — ANESTHESIA EVENT (OUTPATIENT)
Dept: SURGERY | Age: 54
End: 2024-03-05
Payer: COMMERCIAL

## 2024-03-06 ENCOUNTER — APPOINTMENT (OUTPATIENT)
Dept: GENERAL RADIOLOGY | Age: 54
DRG: 497 | End: 2024-03-06
Attending: ORTHOPAEDIC SURGERY
Payer: COMMERCIAL

## 2024-03-06 ENCOUNTER — ANESTHESIA (OUTPATIENT)
Dept: SURGERY | Age: 54
End: 2024-03-06
Payer: COMMERCIAL

## 2024-03-06 ENCOUNTER — HOSPITAL ENCOUNTER (INPATIENT)
Age: 54
LOS: 1 days | Discharge: NURSING, REHAB OR LONG-TERM CARE | DRG: 497 | End: 2024-03-13
Attending: ORTHOPAEDIC SURGERY | Admitting: ORTHOPAEDIC SURGERY
Payer: COMMERCIAL

## 2024-03-06 DIAGNOSIS — T84.9XXA COMPLICATIONS OF INTERNAL ORTHOPEDIC DEVICE, IMPLANT, AND GRAFT (HCC): Primary | ICD-10-CM

## 2024-03-06 PROBLEM — Z98.1 S/P LUMBAR FUSION: Status: ACTIVE | Noted: 2024-03-06

## 2024-03-06 LAB
ABO + RH BLD: NORMAL
BLOOD GROUP ANTIBODIES SERPL: NORMAL
SPECIMEN EXP DATE BLD: NORMAL

## 2024-03-06 PROCEDURE — 0SH004Z INSERTION OF INTERNAL FIXATION DEVICE INTO LUMBAR VERTEBRAL JOINT, OPEN APPROACH: ICD-10-PCS | Performed by: ORTHOPAEDIC SURGERY

## 2024-03-06 PROCEDURE — 2580000003 HC RX 258: Performed by: ANESTHESIOLOGY

## 2024-03-06 PROCEDURE — 0RPA04Z REMOVAL OF INTERNAL FIXATION DEVICE FROM THORACOLUMBAR VERTEBRAL JOINT, OPEN APPROACH: ICD-10-PCS | Performed by: ORTHOPAEDIC SURGERY

## 2024-03-06 PROCEDURE — 86901 BLOOD TYPING SEROLOGIC RH(D): CPT

## 2024-03-06 PROCEDURE — 2709999900 HC NON-CHARGEABLE SUPPLY: Performed by: ORTHOPAEDIC SURGERY

## 2024-03-06 PROCEDURE — 72080 X-RAY EXAM THORACOLMB 2/> VW: CPT

## 2024-03-06 PROCEDURE — 0PH404Z INSERTION OF INTERNAL FIXATION DEVICE INTO THORACIC VERTEBRA, OPEN APPROACH: ICD-10-PCS | Performed by: ORTHOPAEDIC SURGERY

## 2024-03-06 PROCEDURE — 0QW004Z REVISION OF INTERNAL FIXATION DEVICE IN LUMBAR VERTEBRA, OPEN APPROACH: ICD-10-PCS | Performed by: ORTHOPAEDIC SURGERY

## 2024-03-06 PROCEDURE — 2720000010 HC SURG SUPPLY STERILE: Performed by: ORTHOPAEDIC SURGERY

## 2024-03-06 PROCEDURE — 86850 RBC ANTIBODY SCREEN: CPT

## 2024-03-06 PROCEDURE — 6360000002 HC RX W HCPCS: Performed by: REGISTERED NURSE

## 2024-03-06 PROCEDURE — 6370000000 HC RX 637 (ALT 250 FOR IP): Performed by: ORTHOPAEDIC SURGERY

## 2024-03-06 PROCEDURE — 0RHA04Z INSERTION OF INTERNAL FIXATION DEVICE INTO THORACOLUMBAR VERTEBRAL JOINT, OPEN APPROACH: ICD-10-PCS | Performed by: ORTHOPAEDIC SURGERY

## 2024-03-06 PROCEDURE — 7100000000 HC PACU RECOVERY - FIRST 15 MIN: Performed by: ORTHOPAEDIC SURGERY

## 2024-03-06 PROCEDURE — 7100000001 HC PACU RECOVERY - ADDTL 15 MIN: Performed by: ORTHOPAEDIC SURGERY

## 2024-03-06 PROCEDURE — 0RP604Z REMOVAL OF INTERNAL FIXATION DEVICE FROM THORACIC VERTEBRAL JOINT, OPEN APPROACH: ICD-10-PCS | Performed by: ORTHOPAEDIC SURGERY

## 2024-03-06 PROCEDURE — 6360000002 HC RX W HCPCS: Performed by: ORTHOPAEDIC SURGERY

## 2024-03-06 PROCEDURE — 3700000001 HC ADD 15 MINUTES (ANESTHESIA): Performed by: ORTHOPAEDIC SURGERY

## 2024-03-06 PROCEDURE — 2500000003 HC RX 250 WO HCPCS: Performed by: REGISTERED NURSE

## 2024-03-06 PROCEDURE — 86900 BLOOD TYPING SEROLOGIC ABO: CPT

## 2024-03-06 PROCEDURE — 2580000003 HC RX 258: Performed by: ORTHOPAEDIC SURGERY

## 2024-03-06 PROCEDURE — 6370000000 HC RX 637 (ALT 250 FOR IP): Performed by: ANESTHESIOLOGY

## 2024-03-06 PROCEDURE — 3600000004 HC SURGERY LEVEL 4 BASE: Performed by: ORTHOPAEDIC SURGERY

## 2024-03-06 PROCEDURE — 6360000002 HC RX W HCPCS: Performed by: ANESTHESIOLOGY

## 2024-03-06 PROCEDURE — 22849 REINSERT SPINAL FIXATION: CPT | Performed by: ORTHOPAEDIC SURGERY

## 2024-03-06 PROCEDURE — C1713 ANCHOR/SCREW BN/BN,TIS/BN: HCPCS | Performed by: ORTHOPAEDIC SURGERY

## 2024-03-06 PROCEDURE — 8E0WXBZ COMPUTER ASSISTED PROCEDURE OF TRUNK REGION: ICD-10-PCS | Performed by: ORTHOPAEDIC SURGERY

## 2024-03-06 PROCEDURE — 3700000000 HC ANESTHESIA ATTENDED CARE: Performed by: ORTHOPAEDIC SURGERY

## 2024-03-06 PROCEDURE — 0SP004Z REMOVAL OF INTERNAL FIXATION DEVICE FROM LUMBAR VERTEBRAL JOINT, OPEN APPROACH: ICD-10-PCS | Performed by: ORTHOPAEDIC SURGERY

## 2024-03-06 PROCEDURE — 3600000014 HC SURGERY LEVEL 4 ADDTL 15MIN: Performed by: ORTHOPAEDIC SURGERY

## 2024-03-06 PROCEDURE — 0PW404Z REVISION OF INTERNAL FIXATION DEVICE IN THORACIC VERTEBRA, OPEN APPROACH: ICD-10-PCS | Performed by: ORTHOPAEDIC SURGERY

## 2024-03-06 DEVICE — BLOCKER
Type: IMPLANTABLE DEVICE | Site: SPINE LUMBAR | Status: FUNCTIONAL
Brand: XIA 3

## 2024-03-06 DEVICE — POLYAXIAL SCREW
Type: IMPLANTABLE DEVICE | Site: SPINE LUMBAR | Status: FUNCTIONAL
Brand: XIA 3 SYSTEM - SERRATO

## 2024-03-06 RX ORDER — LIDOCAINE HYDROCHLORIDE 20 MG/ML
INJECTION, SOLUTION EPIDURAL; INFILTRATION; INTRACAUDAL; PERINEURAL PRN
Status: DISCONTINUED | OUTPATIENT
Start: 2024-03-06 | End: 2024-03-06 | Stop reason: SDUPTHER

## 2024-03-06 RX ORDER — DIPHENHYDRAMINE HCL 25 MG
25 CAPSULE ORAL EVERY 6 HOURS PRN
Status: DISCONTINUED | OUTPATIENT
Start: 2024-03-06 | End: 2024-03-13 | Stop reason: HOSPADM

## 2024-03-06 RX ORDER — HYDROMORPHONE HYDROCHLORIDE 2 MG/ML
0.5 INJECTION, SOLUTION INTRAMUSCULAR; INTRAVENOUS; SUBCUTANEOUS EVERY 5 MIN PRN
Status: COMPLETED | OUTPATIENT
Start: 2024-03-06 | End: 2024-03-06

## 2024-03-06 RX ORDER — ACETAMINOPHEN 325 MG/1
650 TABLET ORAL EVERY 6 HOURS
Status: DISCONTINUED | OUTPATIENT
Start: 2024-03-06 | End: 2024-03-13 | Stop reason: HOSPADM

## 2024-03-06 RX ORDER — GABAPENTIN 300 MG/1
300 CAPSULE ORAL 3 TIMES DAILY
Status: DISCONTINUED | OUTPATIENT
Start: 2024-03-06 | End: 2024-03-13 | Stop reason: HOSPADM

## 2024-03-06 RX ORDER — SODIUM CHLORIDE 0.9 % (FLUSH) 0.9 %
5-40 SYRINGE (ML) INJECTION EVERY 12 HOURS SCHEDULED
Status: DISCONTINUED | OUTPATIENT
Start: 2024-03-06 | End: 2024-03-13 | Stop reason: HOSPADM

## 2024-03-06 RX ORDER — FAMOTIDINE 20 MG/1
20 TABLET, FILM COATED ORAL 2 TIMES DAILY
Status: DISCONTINUED | OUTPATIENT
Start: 2024-03-06 | End: 2024-03-13 | Stop reason: HOSPADM

## 2024-03-06 RX ORDER — TRANEXAMIC ACID 100 MG/ML
INJECTION, SOLUTION INTRAVENOUS PRN
Status: DISCONTINUED | OUTPATIENT
Start: 2024-03-06 | End: 2024-03-06 | Stop reason: SDUPTHER

## 2024-03-06 RX ORDER — MIDAZOLAM HYDROCHLORIDE 2 MG/2ML
2 INJECTION, SOLUTION INTRAMUSCULAR; INTRAVENOUS
Status: DISCONTINUED | OUTPATIENT
Start: 2024-03-06 | End: 2024-03-06 | Stop reason: HOSPADM

## 2024-03-06 RX ORDER — ONDANSETRON 2 MG/ML
4 INJECTION INTRAMUSCULAR; INTRAVENOUS EVERY 6 HOURS PRN
Status: DISCONTINUED | OUTPATIENT
Start: 2024-03-06 | End: 2024-03-13 | Stop reason: HOSPADM

## 2024-03-06 RX ORDER — OXYCODONE HYDROCHLORIDE 5 MG/1
10 TABLET ORAL EVERY 4 HOURS PRN
Status: DISCONTINUED | OUTPATIENT
Start: 2024-03-06 | End: 2024-03-13 | Stop reason: HOSPADM

## 2024-03-06 RX ORDER — GLYCOPYRROLATE 0.2 MG/ML
INJECTION INTRAMUSCULAR; INTRAVENOUS PRN
Status: DISCONTINUED | OUTPATIENT
Start: 2024-03-06 | End: 2024-03-06 | Stop reason: SDUPTHER

## 2024-03-06 RX ORDER — HYDROMORPHONE HYDROCHLORIDE 1 MG/ML
0.25 INJECTION, SOLUTION INTRAMUSCULAR; INTRAVENOUS; SUBCUTANEOUS
Status: DISCONTINUED | OUTPATIENT
Start: 2024-03-06 | End: 2024-03-13 | Stop reason: HOSPADM

## 2024-03-06 RX ORDER — FENTANYL CITRATE 50 UG/ML
INJECTION, SOLUTION INTRAMUSCULAR; INTRAVENOUS PRN
Status: DISCONTINUED | OUTPATIENT
Start: 2024-03-06 | End: 2024-03-06 | Stop reason: SDUPTHER

## 2024-03-06 RX ORDER — OXYCODONE HYDROCHLORIDE 5 MG/1
5 TABLET ORAL EVERY 4 HOURS PRN
Status: DISCONTINUED | OUTPATIENT
Start: 2024-03-06 | End: 2024-03-13 | Stop reason: HOSPADM

## 2024-03-06 RX ORDER — CYCLOBENZAPRINE HCL 10 MG
10 TABLET ORAL EVERY 12 HOURS PRN
Status: DISCONTINUED | OUTPATIENT
Start: 2024-03-06 | End: 2024-03-13 | Stop reason: HOSPADM

## 2024-03-06 RX ORDER — VANCOMYCIN HYDROCHLORIDE 1 G/20ML
INJECTION, POWDER, LYOPHILIZED, FOR SOLUTION INTRAVENOUS PRN
Status: DISCONTINUED | OUTPATIENT
Start: 2024-03-06 | End: 2024-03-06 | Stop reason: ALTCHOICE

## 2024-03-06 RX ORDER — SODIUM CHLORIDE 0.9 % (FLUSH) 0.9 %
5-40 SYRINGE (ML) INJECTION PRN
Status: DISCONTINUED | OUTPATIENT
Start: 2024-03-06 | End: 2024-03-13 | Stop reason: HOSPADM

## 2024-03-06 RX ORDER — HYDROMORPHONE HYDROCHLORIDE 1 MG/ML
0.5 INJECTION, SOLUTION INTRAMUSCULAR; INTRAVENOUS; SUBCUTANEOUS
Status: DISCONTINUED | OUTPATIENT
Start: 2024-03-06 | End: 2024-03-13 | Stop reason: HOSPADM

## 2024-03-06 RX ORDER — DIPHENHYDRAMINE HYDROCHLORIDE 50 MG/ML
25 INJECTION INTRAMUSCULAR; INTRAVENOUS EVERY 6 HOURS PRN
Status: DISCONTINUED | OUTPATIENT
Start: 2024-03-06 | End: 2024-03-13 | Stop reason: HOSPADM

## 2024-03-06 RX ORDER — ACETAMINOPHEN 500 MG
1000 TABLET ORAL ONCE
Status: COMPLETED | OUTPATIENT
Start: 2024-03-06 | End: 2024-03-06

## 2024-03-06 RX ORDER — ACETAMINOPHEN 500 MG
1000 TABLET ORAL ONCE
Status: DISCONTINUED | OUTPATIENT
Start: 2024-03-06 | End: 2024-03-06 | Stop reason: SDUPTHER

## 2024-03-06 RX ORDER — SENNA AND DOCUSATE SODIUM 50; 8.6 MG/1; MG/1
1 TABLET, FILM COATED ORAL 2 TIMES DAILY
Status: DISCONTINUED | OUTPATIENT
Start: 2024-03-06 | End: 2024-03-13 | Stop reason: HOSPADM

## 2024-03-06 RX ORDER — SODIUM CHLORIDE 9 MG/ML
INJECTION, SOLUTION INTRAVENOUS CONTINUOUS
Status: DISCONTINUED | OUTPATIENT
Start: 2024-03-06 | End: 2024-03-13 | Stop reason: HOSPADM

## 2024-03-06 RX ORDER — SODIUM CHLORIDE, SODIUM LACTATE, POTASSIUM CHLORIDE, CALCIUM CHLORIDE 600; 310; 30; 20 MG/100ML; MG/100ML; MG/100ML; MG/100ML
INJECTION, SOLUTION INTRAVENOUS CONTINUOUS
Status: DISCONTINUED | OUTPATIENT
Start: 2024-03-06 | End: 2024-03-06 | Stop reason: HOSPADM

## 2024-03-06 RX ORDER — DIPHENHYDRAMINE HYDROCHLORIDE 50 MG/ML
12.5 INJECTION INTRAMUSCULAR; INTRAVENOUS
Status: DISCONTINUED | OUTPATIENT
Start: 2024-03-06 | End: 2024-03-06 | Stop reason: HOSPADM

## 2024-03-06 RX ORDER — ONDANSETRON 4 MG/1
4 TABLET, ORALLY DISINTEGRATING ORAL EVERY 8 HOURS PRN
Status: DISCONTINUED | OUTPATIENT
Start: 2024-03-06 | End: 2024-03-13 | Stop reason: HOSPADM

## 2024-03-06 RX ORDER — SODIUM CHLORIDE 9 MG/ML
INJECTION, SOLUTION INTRAVENOUS PRN
Status: DISCONTINUED | OUTPATIENT
Start: 2024-03-06 | End: 2024-03-06 | Stop reason: HOSPADM

## 2024-03-06 RX ORDER — SODIUM CHLORIDE 0.9 % (FLUSH) 0.9 %
5-40 SYRINGE (ML) INJECTION PRN
Status: DISCONTINUED | OUTPATIENT
Start: 2024-03-06 | End: 2024-03-06 | Stop reason: HOSPADM

## 2024-03-06 RX ORDER — PROPOFOL 10 MG/ML
INJECTION, EMULSION INTRAVENOUS PRN
Status: DISCONTINUED | OUTPATIENT
Start: 2024-03-06 | End: 2024-03-06 | Stop reason: SDUPTHER

## 2024-03-06 RX ORDER — NEOSTIGMINE METHYLSULFATE 1 MG/ML
INJECTION, SOLUTION INTRAVENOUS PRN
Status: DISCONTINUED | OUTPATIENT
Start: 2024-03-06 | End: 2024-03-06 | Stop reason: SDUPTHER

## 2024-03-06 RX ORDER — SODIUM CHLORIDE 0.9 % (FLUSH) 0.9 %
5-40 SYRINGE (ML) INJECTION EVERY 12 HOURS SCHEDULED
Status: DISCONTINUED | OUTPATIENT
Start: 2024-03-06 | End: 2024-03-06 | Stop reason: HOSPADM

## 2024-03-06 RX ORDER — LISINOPRIL 20 MG/1
40 TABLET ORAL DAILY
Status: DISCONTINUED | OUTPATIENT
Start: 2024-03-07 | End: 2024-03-13 | Stop reason: HOSPADM

## 2024-03-06 RX ORDER — ROCURONIUM BROMIDE 10 MG/ML
INJECTION, SOLUTION INTRAVENOUS PRN
Status: DISCONTINUED | OUTPATIENT
Start: 2024-03-06 | End: 2024-03-06 | Stop reason: SDUPTHER

## 2024-03-06 RX ORDER — DEXAMETHASONE SODIUM PHOSPHATE 10 MG/ML
INJECTION INTRAMUSCULAR; INTRAVENOUS PRN
Status: DISCONTINUED | OUTPATIENT
Start: 2024-03-06 | End: 2024-03-06 | Stop reason: SDUPTHER

## 2024-03-06 RX ORDER — KETOROLAC TROMETHAMINE 30 MG/ML
30 INJECTION, SOLUTION INTRAMUSCULAR; INTRAVENOUS EVERY 6 HOURS
Status: COMPLETED | OUTPATIENT
Start: 2024-03-06 | End: 2024-03-06

## 2024-03-06 RX ORDER — OXYCODONE HYDROCHLORIDE 5 MG/1
5 TABLET ORAL
Status: DISCONTINUED | OUTPATIENT
Start: 2024-03-06 | End: 2024-03-06 | Stop reason: HOSPADM

## 2024-03-06 RX ORDER — BISACODYL 5 MG/1
5 TABLET, DELAYED RELEASE ORAL DAILY
Status: DISCONTINUED | OUTPATIENT
Start: 2024-03-06 | End: 2024-03-13 | Stop reason: HOSPADM

## 2024-03-06 RX ORDER — SODIUM CHLORIDE 9 MG/ML
INJECTION, SOLUTION INTRAVENOUS PRN
Status: DISCONTINUED | OUTPATIENT
Start: 2024-03-06 | End: 2024-03-13 | Stop reason: HOSPADM

## 2024-03-06 RX ORDER — FENTANYL CITRATE 50 UG/ML
100 INJECTION, SOLUTION INTRAMUSCULAR; INTRAVENOUS
Status: DISCONTINUED | OUTPATIENT
Start: 2024-03-06 | End: 2024-03-06 | Stop reason: HOSPADM

## 2024-03-06 RX ORDER — OXYCODONE HYDROCHLORIDE 10 MG/1
10 TABLET ORAL EVERY 6 HOURS PRN
Qty: 28 TABLET | Refills: 0 | Status: SHIPPED | OUTPATIENT
Start: 2024-03-06 | End: 2024-03-13

## 2024-03-06 RX ORDER — ONDANSETRON 2 MG/ML
INJECTION INTRAMUSCULAR; INTRAVENOUS PRN
Status: DISCONTINUED | OUTPATIENT
Start: 2024-03-06 | End: 2024-03-06 | Stop reason: SDUPTHER

## 2024-03-06 RX ORDER — ONDANSETRON 2 MG/ML
4 INJECTION INTRAMUSCULAR; INTRAVENOUS
Status: DISCONTINUED | OUTPATIENT
Start: 2024-03-06 | End: 2024-03-06 | Stop reason: HOSPADM

## 2024-03-06 RX ADMIN — TRANEXAMIC ACID 2000 MG: 100 INJECTION, SOLUTION INTRAVENOUS at 12:30

## 2024-03-06 RX ADMIN — BISACODYL 5 MG: 5 TABLET, COATED ORAL at 16:53

## 2024-03-06 RX ADMIN — GLYCOPYRROLATE 0.4 MG: 0.2 INJECTION INTRAMUSCULAR; INTRAVENOUS at 13:50

## 2024-03-06 RX ADMIN — ACETAMINOPHEN 650 MG: 325 TABLET ORAL at 16:53

## 2024-03-06 RX ADMIN — KETOROLAC TROMETHAMINE 30 MG: 30 INJECTION, SOLUTION INTRAMUSCULAR; INTRAVENOUS at 21:11

## 2024-03-06 RX ADMIN — HYDROMORPHONE HYDROCHLORIDE 0.5 MG: 2 INJECTION, SOLUTION INTRAMUSCULAR; INTRAVENOUS; SUBCUTANEOUS at 14:30

## 2024-03-06 RX ADMIN — SODIUM CHLORIDE, POTASSIUM CHLORIDE, SODIUM LACTATE AND CALCIUM CHLORIDE: 600; 310; 30; 20 INJECTION, SOLUTION INTRAVENOUS at 10:55

## 2024-03-06 RX ADMIN — VANCOMYCIN HYDROCHLORIDE 1000 MG: 1 INJECTION, POWDER, LYOPHILIZED, FOR SOLUTION INTRAVENOUS at 10:54

## 2024-03-06 RX ADMIN — DOCUSATE SODIUM 50 MG AND SENNOSIDES 8.6 MG 1 TABLET: 8.6; 5 TABLET, FILM COATED ORAL at 20:31

## 2024-03-06 RX ADMIN — LIDOCAINE HYDROCHLORIDE 100 MG: 20 INJECTION, SOLUTION EPIDURAL; INFILTRATION; INTRACAUDAL; PERINEURAL at 12:17

## 2024-03-06 RX ADMIN — CEFAZOLIN SODIUM 2000 MG: 100 INJECTION, POWDER, LYOPHILIZED, FOR SOLUTION INTRAVENOUS at 20:30

## 2024-03-06 RX ADMIN — OXYCODONE HYDROCHLORIDE 10 MG: 5 TABLET ORAL at 20:30

## 2024-03-06 RX ADMIN — HYDROMORPHONE HYDROCHLORIDE 0.5 MG: 2 INJECTION, SOLUTION INTRAMUSCULAR; INTRAVENOUS; SUBCUTANEOUS at 14:41

## 2024-03-06 RX ADMIN — ONDANSETRON 4 MG: 2 INJECTION INTRAMUSCULAR; INTRAVENOUS at 12:21

## 2024-03-06 RX ADMIN — ACETAMINOPHEN 650 MG: 325 TABLET ORAL at 20:30

## 2024-03-06 RX ADMIN — SODIUM CHLORIDE, PRESERVATIVE FREE 10 ML: 5 INJECTION INTRAVENOUS at 20:36

## 2024-03-06 RX ADMIN — SODIUM CHLORIDE: 9 INJECTION, SOLUTION INTRAVENOUS at 16:42

## 2024-03-06 RX ADMIN — KETOROLAC TROMETHAMINE 30 MG: 30 INJECTION, SOLUTION INTRAMUSCULAR; INTRAVENOUS at 16:53

## 2024-03-06 RX ADMIN — ACETAMINOPHEN 1000 MG: 500 TABLET ORAL at 10:55

## 2024-03-06 RX ADMIN — HYDROMORPHONE HYDROCHLORIDE 0.5 MG: 2 INJECTION, SOLUTION INTRAMUSCULAR; INTRAVENOUS; SUBCUTANEOUS at 14:52

## 2024-03-06 RX ADMIN — DEXAMETHASONE SODIUM PHOSPHATE 10 MG: 10 INJECTION INTRAMUSCULAR; INTRAVENOUS at 12:21

## 2024-03-06 RX ADMIN — Medication 3 MG: at 13:50

## 2024-03-06 RX ADMIN — GABAPENTIN 300 MG: 300 CAPSULE ORAL at 20:31

## 2024-03-06 RX ADMIN — Medication 2000 MG: at 12:30

## 2024-03-06 RX ADMIN — FENTANYL CITRATE 100 MCG: 50 INJECTION, SOLUTION INTRAMUSCULAR; INTRAVENOUS at 12:13

## 2024-03-06 RX ADMIN — FAMOTIDINE 20 MG: 20 TABLET, FILM COATED ORAL at 20:31

## 2024-03-06 RX ADMIN — ROCURONIUM BROMIDE 50 MG: 10 INJECTION, SOLUTION INTRAVENOUS at 12:17

## 2024-03-06 RX ADMIN — HYDROMORPHONE HYDROCHLORIDE 0.5 MG: 2 INJECTION, SOLUTION INTRAMUSCULAR; INTRAVENOUS; SUBCUTANEOUS at 14:25

## 2024-03-06 RX ADMIN — PROPOFOL 200 MG: 10 INJECTION, EMULSION INTRAVENOUS at 12:17

## 2024-03-06 ASSESSMENT — PAIN SCALES - GENERAL
PAINLEVEL_OUTOF10: 8
PAINLEVEL_OUTOF10: 9
PAINLEVEL_OUTOF10: 10
PAINLEVEL_OUTOF10: 9
PAINLEVEL_OUTOF10: 9
PAINLEVEL_OUTOF10: 10
PAINLEVEL_OUTOF10: 8
PAINLEVEL_OUTOF10: 8
PAINLEVEL_OUTOF10: 10
PAINLEVEL_OUTOF10: 9
PAINLEVEL_OUTOF10: 10
PAINLEVEL_OUTOF10: 8
PAINLEVEL_OUTOF10: 10
PAINLEVEL_OUTOF10: 6
PAINLEVEL_OUTOF10: 8
PAINLEVEL_OUTOF10: 10

## 2024-03-06 ASSESSMENT — PAIN DESCRIPTION - LOCATION
LOCATION: BACK;SHOULDER
LOCATION: BACK
LOCATION: BACK;SHOULDER
LOCATION: BACK
LOCATION: BACK;SHOULDER
LOCATION: BACK
LOCATION: BACK;SHOULDER
LOCATION: BACK
LOCATION: BACK
LOCATION: BACK;SHOULDER
LOCATION: BACK;SHOULDER
LOCATION: BACK
LOCATION: BACK;SHOULDER
LOCATION: BACK

## 2024-03-06 ASSESSMENT — PAIN - FUNCTIONAL ASSESSMENT
PAIN_FUNCTIONAL_ASSESSMENT: 0-10
PAIN_FUNCTIONAL_ASSESSMENT: ACTIVITIES ARE NOT PREVENTED
PAIN_FUNCTIONAL_ASSESSMENT: ACTIVITIES ARE NOT PREVENTED

## 2024-03-06 ASSESSMENT — PAIN DESCRIPTION - ORIENTATION
ORIENTATION: MID
ORIENTATION: RIGHT
ORIENTATION: POSTERIOR

## 2024-03-06 ASSESSMENT — PAIN DESCRIPTION - DESCRIPTORS
DESCRIPTORS: SHARP
DESCRIPTORS: STABBING;SPASM
DESCRIPTORS: ACHING

## 2024-03-06 ASSESSMENT — PAIN DESCRIPTION - FREQUENCY: FREQUENCY: CONTINUOUS

## 2024-03-06 ASSESSMENT — PAIN DESCRIPTION - ONSET: ONSET: GRADUAL

## 2024-03-06 NOTE — DISCHARGE INSTRUCTIONS
Discharge Instructions - Spine Surgery    Wound Care and Showering  Your wound will typically be covered with a clear mesh and glue, which is waterproof sufficient for showering but not soaking in a bath. If there is some drainage or bleeding from under the glue, the area should be covered with gauze and secured with tape or Tegaderm (purchased at a pharmacy) until the drainage stops. Tegaderm is preferable since it is waterproof and can be worn in the shower.  Once the drainage stops, the outer gauze and Tegaderm dressing can be removed, while leaving the glue layer in place for 10-14 days.  Hair washing is permissible while in the shower. No tub baths, hot tubs or whirlpools until seen in the office. Once the wound is healed, the glue can be removed by dissolving with a triple-antibiotic or we can remove it at your first post operative visit.        If any of the following should occur, please call the office:  Fevers greater than 101 degrees that does not improve after tylenol use.  Increased redness or large amount of swelling around incision    Exercise  Walking and stair climbing privileges are unlimited including walking on a treadmill without an incline.  Do NOT lift  greater than 15 lbs until otherwise instructed. Avoid lifting or reaching above your head.    Sleeping  You may sleep in any comfortable position. Many patients find comfort sleeping in a recliner chair. It is normal to have difficulty sleeping for the first several weeks following your surgery. We recommend trying Benadryl, Melatonin, or Tylenol PM for help sleeping. All are over-the-counter and can be found in drugstores.    Eating  Because of the tubes in your throat while asleep during surgery, it is normal to have a sore throat and some difficulty swallowing solid foods after your surgery. This may persist for several weeks. Eating soft foods like yogurt, macaroni and mashed potatoes seem to help.    Pain  If you feel you need pain  instructed by your physician, during this time.    After general anesthesia, for 24 hours or while taking prescription Narcotics  Limit your activities  A responsible adult needs to be with you for the next 24 hours  Do not drive and operate hazardous machinery  Do not make important personal or business decisions  Do not drink alcoholic beverages  If you have not urinated within 8 hours after discharge, and you are experiencing discomfort from urinary retention, please go to the nearest ED.  If you have sleep apnea and have a CPAP machine, please use it for all naps and sleeping.  Please use caution when taking narcotics and any of your home medications that may cause drowsiness.    Anticoagulant Medications  Anticoagulant medications may be resumed on the third day (or 72 hours) after spinal surgery.      IF YOU HAVE QUESTIONS ONCE YOU ARE AT HOME CALL THE FOLLOWING NUMBERS:   Main office number: [phone]    For emergency call [phone]

## 2024-03-06 NOTE — ANESTHESIA POSTPROCEDURE EVALUATION
Department of Anesthesiology  Postprocedure Note    Patient: Jesus Walters  MRN: [id number]  YOB: 1970  Date of evaluation: 3/6/2024    Procedure Summary       Date: 03/06/24 Room / Location: [facility] MAIN OR [address] [place] [address] MAIN OR    Anesthesia Start: 1205 Anesthesia Stop: 1413    Procedure: T10-L3 reinstrumentation. (Spine Lumbar) Diagnosis:       Complications of internal orthopedic device, implant, and graft (HCC)      (Complications of internal orthopedic device, implant, and graft (HCC) [T84.9XXA])    Providers: Luis Alberto Hines MD Responsible Provider: Salvador Lindsey MD    Anesthesia Type: General ASA Status: 3            Anesthesia Type: General    Cassandra Phase I: Cassandra Score: 10    Cassandra Phase II:      Anesthesia Post Evaluation    Patient location during evaluation: PACU  Patient participation: complete - patient participated  Level of consciousness: awake and alert  Airway patency: patent  Nausea & Vomiting: no nausea and no vomiting  Cardiovascular status: hemodynamically stable  Respiratory status: acceptable, nonlabored ventilation and spontaneous ventilation  Hydration status: euvolemic  Comments: /67   Pulse 74   Temp 98.2 °F (36.8 °C) (Temporal)   Resp 16   Ht 1.778 m (5' 10\")   Wt 79.4 kg (175 lb)   SpO2 98%   BMI 25.11 kg/m²     Multimodal analgesia pain management approach  Pain management: adequate and satisfactory to patient    No notable events documented.

## 2024-03-06 NOTE — ANESTHESIA PRE PROCEDURE
lumbar vertebra with routine healing S32.012D   • Wedge compression fracture of first lumbar vertebra, subsequent encounter for fracture with nonunion S32.010K       Past Medical History:        Diagnosis Date   • Androgen deficiency    • Chronic back pain    • Complications of internal orthopedic device, implant, and graft (HCC) 02/23/2024   • Depression with anxiety    • Dermatophytosis of nail    • Discitis of lumbar region    • Elevated PSA     Biopsy negative, active surveillence Dr Teixeira   • History of echocardiogram     Echo Stress 2021  LVEF 65%- mild-moderate MVP   • History of gastroesophageal reflux (GERD)     well controlled w/o meds since 25 lb weightloss   • History of Lyme disease 2006   • Hyperlipidemia    • Hypertension    • Major depression    • Male hypogonadism    • Mitral valve prolapse     Echo Stress 2021 LVEF 65%- mild-moderate MVP   • Mitral valve regurgitation     Echo Stress 2021 LVEF 65%- mild-moderate MVP   • Obstructive sleep apnea     2/23/24 does not use CPAP, states has lost 25 lbs recently and can not tolerate due to back pain and sleeping position   • Prostate enlargement        Past Surgical History:        Procedure Laterality Date   • COLONOSCOPY     • ESOPHAGEAL DILATATION     • LUMBAR FUSION N/A 01/03/2024    L1 Laminectomy and T10-L3 fusion with allograft and instrumentation performed by Luis Alberto Hines MD at [facility] MAIN OR   • LUMBAR LAMINECTOMY  2003    L3-4 laminectomy/discectomy   • PROSTATE BIOPSY  2021       Social History:    Social History     Tobacco Use   • Smoking status: Never   • Smokeless tobacco: Former     Types: Chew, Snuff     Quit date: 2014   Substance Use Topics   • Alcohol use: Not Currently                                Counseling given: Not Answered      Vital Signs (Current):   Vitals:    03/06/24 1028   BP: (!) 148/81   Pulse: (!) 105   Resp: 16   Temp: 98.6 °F (37 °C)   TempSrc: Oral   SpO2: 95%   Weight: 79.4 kg (175 lb)   Height: 1.778 m

## 2024-03-06 NOTE — PERIOP NOTE
TRANSFER - OUT REPORT:    Verbal report given to Cristal RICHMOND on Jesus Walters  being transferred to [facility] for routine progression of patient care       Report consisted of patient’s Situation, Background, Assessment and   Recommendations(SBAR).     Information from the following report(s) Nurse Handoff Report, Surgery Report, Intake/Output, MAR, and Cardiac Rhythm nsr  was reviewed with the receiving nurse.    Lines:   Peripheral IV 03/06/24 Right Wrist (Active)   Site Assessment Clean, dry & intact 03/06/24 1521   Line Status Infusing 03/06/24 1521   Line Care Connections checked and tightened 03/06/24 1521   Phlebitis Assessment No symptoms 03/06/24 1521   Infiltration Assessment 0 03/06/24 1521   Dressing Status Clean, dry & intact 03/06/24 1521   Dressing Type Transparent 03/06/24 1521        Opportunity for questions and clarification was provided.      Patient transported with:   O2 @ 3 liters        Patient and family given floor number and nurses name.  Family updated re: pt status after security code verified.

## 2024-03-06 NOTE — PROGRESS NOTES
..TRANSFER - IN REPORT:    Verbal report received from YI Mason on Jesus Walters  being received from PACU for routine progression of patient care      Report consisted of patient's Situation, Background, Assessment and   Recommendations(SBAR).     Information from the following report(s) Surgery Report was reviewed with the receiving nurse.    Opportunity for questions and clarification was provided.

## 2024-03-06 NOTE — ADDENDUM NOTE
Addendum  created 03/06/24 1539 by Lyle Werner APRN - CRNA    Intraprocedure Meds edited, Orders acknowledged in Narrator

## 2024-03-06 NOTE — OP NOTE
[facility] [id number]   [phone]    OPERATIVE REPORT    Patient ID:Jesus Walters  [id number]  1970  53 y.o.    DATE OF SURGERY: 3/6/2024    SURGEON: Luis Alberto Porras MD    ASSISTANT: Hugh Morris MD.   A skilled assistant was deemed necessary for the case due to the intermittent proximity of the spinal cord and nerve roots.  It was there for the entire duration of the surgery.    PREOPERATIVE DIAGNOSIS:     1. Prior thoracolumbar fusion with instrumentation    POSTOPERATIVE DIAGNOSIS:     1. Prior thoracolumbar fusion with instrumentation    PROCEDURE:    1. Reinstrumentation T10-L3     ANESTHESIA: General.    ESTIMATED BLOOD LOSS: 100 ml    POSTOPERATIVE CONDITION: Stable.    INTRAOPERATIVE COMPLICATIONS: None.    IMPLANTS:   Implant Name Type Inv. Item Serial No.  Lot No. LRB No. Used Action   SCREW SPNL L45MM DIA6.5MM POLYAX DEWITT - [id number]  SCREW SPNL L45MM DIA6.5MM POLYAX DEWITT  [employer] [id number] N/A 1 Explanted   BLOCKER SPNL L50MM DIA6MM TI 1 LEV WOODY 3 - [id number]  BLOCKER SPNL L50MM DIA6MM TI 1 LEV WOODY 3  [employer] [id number] [id number] N/A 6 Implanted   BLOCKER SPNL CT WOODY 45 - [id number]  BLOCKER SPNL CT WOODY 45  [employer] [id number] N/A 6 Explanted   SCREW SPNL L45MM DIA5.5MM POLYAX DEWITT - [id number] Screw/Plate/Nail/Cedric SCREW SPNL L45MM DIA5.5MM POLYAX DEWITT  [employer] 3 6 [id number] N/A 1 Implanted       INDICATIONS FOR PROCEDURE:  Left L3 radiculopathy due to abberant hardware s/p T10-L3 instrumentation. In the outpatient setting, the risks, benefits, and potential complications of the above listed procedure were discussed and an informed consent was obtained.    DESCRIPTION OF PROCEDURE:     After adequate induction of general anesthesia, the patient was positioned prone on the Conrad spinal table. Care was taken to pad all bony prominences. The shoulders and

## 2024-03-07 PROCEDURE — 97112 NEUROMUSCULAR REEDUCATION: CPT

## 2024-03-07 PROCEDURE — 6370000000 HC RX 637 (ALT 250 FOR IP): Performed by: ORTHOPAEDIC SURGERY

## 2024-03-07 PROCEDURE — 2580000003 HC RX 258: Performed by: ORTHOPAEDIC SURGERY

## 2024-03-07 PROCEDURE — 97530 THERAPEUTIC ACTIVITIES: CPT

## 2024-03-07 PROCEDURE — 97161 PT EVAL LOW COMPLEX 20 MIN: CPT

## 2024-03-07 PROCEDURE — 97165 OT EVAL LOW COMPLEX 30 MIN: CPT

## 2024-03-07 PROCEDURE — 6360000002 HC RX W HCPCS: Performed by: ORTHOPAEDIC SURGERY

## 2024-03-07 PROCEDURE — 97535 SELF CARE MNGMENT TRAINING: CPT

## 2024-03-07 RX ADMIN — OXYCODONE HYDROCHLORIDE 10 MG: 5 TABLET ORAL at 05:58

## 2024-03-07 RX ADMIN — OXYCODONE HYDROCHLORIDE 10 MG: 5 TABLET ORAL at 09:40

## 2024-03-07 RX ADMIN — ACETAMINOPHEN 650 MG: 325 TABLET ORAL at 03:59

## 2024-03-07 RX ADMIN — LISINOPRIL 40 MG: 20 TABLET ORAL at 09:39

## 2024-03-07 RX ADMIN — GABAPENTIN 300 MG: 300 CAPSULE ORAL at 21:42

## 2024-03-07 RX ADMIN — DOCUSATE SODIUM 50 MG AND SENNOSIDES 8.6 MG 1 TABLET: 8.6; 5 TABLET, FILM COATED ORAL at 09:39

## 2024-03-07 RX ADMIN — SODIUM CHLORIDE, PRESERVATIVE FREE 5 ML: 5 INJECTION INTRAVENOUS at 21:45

## 2024-03-07 RX ADMIN — FAMOTIDINE 20 MG: 20 TABLET, FILM COATED ORAL at 21:42

## 2024-03-07 RX ADMIN — DOCUSATE SODIUM 50 MG AND SENNOSIDES 8.6 MG 1 TABLET: 8.6; 5 TABLET, FILM COATED ORAL at 21:42

## 2024-03-07 RX ADMIN — CEFAZOLIN SODIUM 2000 MG: 100 INJECTION, POWDER, LYOPHILIZED, FOR SOLUTION INTRAVENOUS at 04:04

## 2024-03-07 RX ADMIN — ACETAMINOPHEN 650 MG: 325 TABLET ORAL at 09:39

## 2024-03-07 RX ADMIN — GABAPENTIN 300 MG: 300 CAPSULE ORAL at 14:53

## 2024-03-07 RX ADMIN — BISACODYL 5 MG: 5 TABLET, COATED ORAL at 09:39

## 2024-03-07 RX ADMIN — ACETAMINOPHEN 650 MG: 325 TABLET ORAL at 21:45

## 2024-03-07 RX ADMIN — ACETAMINOPHEN 650 MG: 325 TABLET ORAL at 17:46

## 2024-03-07 RX ADMIN — GABAPENTIN 300 MG: 300 CAPSULE ORAL at 09:39

## 2024-03-07 RX ADMIN — OXYCODONE HYDROCHLORIDE 10 MG: 5 TABLET ORAL at 14:52

## 2024-03-07 RX ADMIN — SODIUM CHLORIDE: 9 INJECTION, SOLUTION INTRAVENOUS at 04:52

## 2024-03-07 RX ADMIN — FAMOTIDINE 20 MG: 20 TABLET, FILM COATED ORAL at 09:39

## 2024-03-07 RX ADMIN — OXYCODONE HYDROCHLORIDE 5 MG: 5 TABLET ORAL at 21:45

## 2024-03-07 RX ADMIN — CYCLOBENZAPRINE 10 MG: 10 TABLET, FILM COATED ORAL at 17:48

## 2024-03-07 ASSESSMENT — PAIN DESCRIPTION - PAIN TYPE
TYPE: SURGICAL PAIN

## 2024-03-07 ASSESSMENT — PAIN DESCRIPTION - DESCRIPTORS
DESCRIPTORS: ACHING
DESCRIPTORS: ACHING;THROBBING
DESCRIPTORS: ACHING
DESCRIPTORS: ACHING;SORE
DESCRIPTORS: ACHING

## 2024-03-07 ASSESSMENT — PAIN SCALES - GENERAL
PAINLEVEL_OUTOF10: 6
PAINLEVEL_OUTOF10: 0
PAINLEVEL_OUTOF10: 9
PAINLEVEL_OUTOF10: 2
PAINLEVEL_OUTOF10: 3
PAINLEVEL_OUTOF10: 8
PAINLEVEL_OUTOF10: 9
PAINLEVEL_OUTOF10: 0

## 2024-03-07 ASSESSMENT — PAIN DESCRIPTION - FREQUENCY
FREQUENCY: INTERMITTENT

## 2024-03-07 ASSESSMENT — PAIN DESCRIPTION - ONSET
ONSET: GRADUAL

## 2024-03-07 ASSESSMENT — PAIN DESCRIPTION - ORIENTATION
ORIENTATION: MID;LOWER
ORIENTATION: MID
ORIENTATION: POSTERIOR;MID;LEFT;RIGHT
ORIENTATION: MID
ORIENTATION: MID

## 2024-03-07 ASSESSMENT — PAIN DESCRIPTION - LOCATION
LOCATION: BACK
LOCATION: BACK;LEG

## 2024-03-07 ASSESSMENT — PAIN - FUNCTIONAL ASSESSMENT
PAIN_FUNCTIONAL_ASSESSMENT: ACTIVITIES ARE NOT PREVENTED
PAIN_FUNCTIONAL_ASSESSMENT: ACTIVITIES ARE NOT PREVENTED

## 2024-03-07 NOTE — PROGRESS NOTES
ACUTE PHYSICAL THERAPY GOALS:   (Developed with and agreed upon by patient and/or caregiver.)  Pt will perform bed mobility with SBA while maintaining spinal precautions in 7 therapy sessions.  Pt will perform sit-to-stand/ stand-to-sit transfers SBA in 7 therapy sessions.  Pt will ambulate 250 ft SBA with use of LRAD/no device and breaks as needed in 7 therapy sessions.  Pt will tolerate 15 minutes of standing activity with LRAD/no device in 7 therapy sessions.  Pt will perform standing dynamic balance activities with minimal postural sway in 7 therapy sessions.  Pt will tolerate multiple sets and reps of BLE exercises in 7 therapy sessions.     PHYSICAL THERAPY Initial Assessment and AM  (Link to Caseload Tracking: PT Visit Days : 1  Acknowledge Orders  Time In/Out  PT Charge Capture  Rehab Caseload Tracker    Spinal Precautions    Jesus Walters is a 53 y.o. male   PRIMARY DIAGNOSIS: Complications of internal orthopedic device, implant, and graft (HCC)  Complications of internal orthopedic device, implant, and graft (HCC) [T84.9XXA]  S/P lumbar fusion [Z98.1]  Procedure(s) (LRB):  T10-L3 reinstrumentation. (N/A)  1 Day Post-Op  Reason for Referral: Other abnormalities of gait and mobility (R26.89)  Outpatient in a bed: Payor: NATALIA / Plan: [facility] / Product Type: *No Product type* /     ASSESSMENT:     REHAB RECOMMENDATIONS:   Recommendation to date pending progress:  Setting:  Short-term Rehab    Equipment:    To Be Determined     ASSESSMENT:  Mr. Walters is a 53 y.o. male presenting to PT s/p T10-L3 fusion reinstrumentation. At baseline, pt ambulates with no AD for short distances and was in STR rehab, but is homeless at baseline and has been living in a motel prior to admission. Prior to mobility, spinal spinal precautions were reviewed with pt and pt verbalized understanding. At time of initial evaluation, pt presents below baseline with deficits in bed mobility, strength, transfers,  PRECAUTION / LINES / DRAINS:   Pre Treatment: 8/10 lumbar pain         Post Treatment: Denies pain Vitals        Oxygen      Hemovac and IV    RESTRICTIONS/PRECAUTIONS:  Restrictions/Precautions: Fall Risk, Bed Alarm        Spinal Precautions: No Bending, No Lifting, No Twisting        GROSS EVALUATION:  Intact Impaired (Comments):   AROM []  Trunk ROM limited by spinal precautions    PROM [x]    Strength []  Generalized weakness   Balance []  Pt in need of AD during dynamic standing activities for balance    Posture [] N/A   Sensation [x]     Coordination [x]      Tone []  NT formally   Edema [x]    Activity Tolerance []  Limited by pain and quick to fatigue    []      COGNITION/  PERCEPTION: Intact Impaired (Comments):   Orientation [x]     Vision []  Appears functionally intact    Hearing []  Appears functionally intact    Cognition  [x]       MOBILITY: I Mod I S SBA CGA Min Mod Max Total  NT x2 Comments:   Bed Mobility    Rolling [] [] [] [] [x] [x] [] [] [] [] []    Supine to Sit [] [] [] [] [] [] [x] [] [] [] [x]    Scooting [] [] [] [] [x] [] [] [] [] [] []    Sit to Supine [] [] [] [] [] [] [] [] [] [x] []    Transfers    Sit to Stand [] [] [] [] [x] [x] [] [] [] [] []    Bed to Chair [] [] [] [] [x] [x] [] [] [] [] []    Stand to Sit [] [] [] [] [x] [x] [] [] [] [] []     [] [] [] [] [] [] [] [] [] [] []    I=Independent, Mod I=Modified Independent, S=Supervision, SBA=Standby Assistance, CGA=Contact Guard Assistance,   Min=Minimal Assistance, Mod=Moderate Assistance, Max=Maximal Assistance, Total=Total Assistance, NT=Not Tested    GAIT: I Mod I S SBA CGA Min Mod Max Total  NT x2 Comments:   Level of Assistance [] [] [] [] [x] [x] [] [] [] [] []    Distance 10'/75'  feet    DME Rolling Walker    Gait Quality Decreased rikki , Decreased step clearance, Decreased step length, and inconsistent LLE clearance    Weightbearing Status      Stairs      I=Independent, Mod I=Modified Independent, S=Supervision,

## 2024-03-07 NOTE — CARE COORDINATION
1538:  [facility] and [facility] declined pt referral.  Follow up with pt about other choices.  Placed referrals to [facility] and [facility].  Await review.      Pt chart reviewed for discharge planning. CM met with pt at bedside, verified demographic information/health insurance. Pt has been homeless but was staying in a local motel. PCP was confirmed, last seen 10/2/46177 with multiple specialist visits recently. Pt is agreeable to SNF rehab placement.  Requested referrals to [facility] and [facility].  Referrals placed as requested. Pt has a referral to [facility] made by surgeon for home care after rehab.  CM will follow pt plan of care and assist further with supportive care referrals as appropriate.  Please consult case management if additional needs arise.      03/07/24 1321   Service Assessment   Patient Orientation Alert and Oriented   Cognition Alert   History Provided By Patient;Medical Record   Primary Caregiver Self   Support Systems Family Members   Patient's Healthcare Decision Maker is: Legal Next of Kin   PCP Verified by CM Yes   Last Visit to PCP Within last 6 months  (10/2/2024)   Prior Functional Level Assistance with the following:;Mobility   Current Functional Level Assistance with the following:;Mobility   Can patient return to prior living arrangement Other (see comment)  (rehab recommended)   Ability to make needs known: Good   Family able to assist with home care needs: Yes   Would you like for me to discuss the discharge plan with any other family members/significant others, and if so, who? No   Financial Resources Other (Comment)  (BC/BS)   Community Resources None   Social/Functional History   Lives With Other (comment)  (Motel)   Type of Home   (Motel)   Discharge Planning   Type of Residence Other (Comment)  (Motel)   Living Arrangements Alone   Current Services Prior To Admission None   Potential Assistance Needed Skilled Nursing Facility  (rehab)   DME Ordered? No   Potential Assistance Purchasing  Medications No   Type of Home Care Services None  (has had [facility])   Patient expects to be discharged to: Skilled nursing facility   Services At/After Discharge   Transition of Care Consult (CM Consult) Discharge Planning;SNF   Services At/After Discharge Skilled Nursing Facility (SNF)    Resource Information Provided? No   Mode of Transport at Discharge ALS  ([employer])   Confirm Follow Up Transport Cab   Condition of Participation: Discharge Planning   The Plan for Transition of Care is related to the following treatment goals: Pt will need rehab placement to return to his functional baseline.   The Patient and/or Patient Representative was provided with a Choice of Provider? Patient   The Patient and/Or Patient Representative agree with the Discharge Plan? Yes   Freedom of Choice list was provided with basic dialogue that supports the patient's individualized plan of care/goals, treatment preferences, and shares the quality data associated with the providers?  Yes

## 2024-03-07 NOTE — PROGRESS NOTES
Alarm  Position Activity Restriction  Spinal Precautions: No Bending, No Lifting, No Twisting    PAIN: VITALS / O2:   Pre Treatment:    8/10 in lower back      Post Treatment: no c/o pain, resting comfortably in bedside chair       Vitals          Oxygen            GROSS EVALUATION: INTACT IMPAIRED   (See Comments)   UE AROM [x] []   UE PROM [x] []   Strength []  Generally weak but functional      Posture / Balance [] Posture: Fair  Sitting - Static: Fair  Sitting - Dynamic: Fair, -  Standing - Static: Fair  Standing - Dynamic: Fair, -   Sensation []  Decreased in L hand, reports numbness, does not affect function   Coordination []  Decreased      Tone [x]       Edema [x]    Activity Tolerance []  Decreased from baseline, requires seated rest breaks     Hand Dominance R [] L []      COGNITION/  PERCEPTION: INTACT IMPAIRED   (See Comments)   Orientation [x]     Vision [x]     Hearing [x]     Cognition  []  Decreased safety and insight   Perception [x]       MOBILITY: I Mod I S SBA CGA Min Mod Max Total  NT x2 Comments:   Bed Mobility    Rolling [] [] [] [] [] [] [x] [] [] [] [x]    Supine to Sit [] [] [] [] [] [] [x] [] [] [] [x] Log roll   Scooting [] [] [] [] [] [x] [] [] [] [] []    Sit to Supine [] [] [] [] [] [] [] [] [] [x] []    Transfers    Sit to Stand [] [] [] [] [] [x] [] [] [] [] [] RW   Bed to Chair [] [] [] [] [] [x] [] [] [] [] [] RW   Stand to Sit [] [] [] [] [] [x] [] [] [] [] [] RW   Tub/Shower [] [] [] [] [] [] [] [] [] [x] []     Toilet [] [] [] [] [] [] [] [] [] [x] []      [] [] [] [] [] [] [] [] [] [x] []    I=Independent, Mod I=Modified Independent, S=Supervision/Setup, SBA=Standby Assistance, CGA=Contact Guard Assistance, Min=Minimal Assistance, Mod=Moderate Assistance, Max=Maximal Assistance, Total=Total Assistance, NT=Not Tested    ACTIVITIES OF DAILY LIVING: I Mod I S SBA CGA Min Mod Max Total NT Comments   BASIC ADLs:              Upper Body Bathing  [] [] [] [] [] [] [] [] [] [x]      Lower Body Bathing [] [] [] [] [] [] [] [] [] [x]     Toileting [] [] [] [] [] [] [] [] [] [x]    Upper Body Dressing [] [] [] [] [] [] [] [] [] [x]    Lower Body Dressing [] [] [] [] [] [] [] [x] [] [] Donning tennishoes EOB   Feeding [] [] [] [] [] [] [] [] [] [x]    Grooming [] [] [] [] [x] [x] [] [] [] [] For standing balance brushing teeth and washing face standing at sink   Personal Device Care [] [] [] [] [] [] [] [] [] [x]    Functional Mobility [] [] [] [] [] [x] [] [] [] [] RW   I=Independent, Mod I=Modified Independent, S=Supervision/Setup, SBA=Standby Assistance, CGA=Contact Guard Assistance, Min=Minimal Assistance, Mod=Moderate Assistance, Max=Maximal Assistance, Total=Total Assistance, NT=Not Tested    PLAN:   FREQUENCY/DURATION   OT Plan of Care: 3 times/week for duration of hospital stay or until stated goals are met, whichever comes first.    PROBLEM LIST:   (Skilled intervention is medically necessary to address:)  Decreased ADL/Functional Activities  Decreased Activity Tolerance  Decreased Balance  Decreased Coordination  Decreased Gait Ability  Decreased Safety Awareness  Decreased Strength  Decreased Transfer Abilities  Increased Pain   INTERVENTIONS PLANNED:  (Benefits and precautions of occupational therapy have been discussed with the patient.)  Self Care Training  Therapeutic Activity  Therapeutic Exercise/HEP  Neuromuscular Re-education  Manual Therapy  Education         TREATMENT:     EVALUATION: LOW COMPLEXITY: (Untimed Charge)    TREATMENT:   Neuromuscular Re-education (13 Minutes): Patient participated in neuromuscular re-education including functional reaching, weight shifting, postural training, midline training, standing tolerance activity , and sitting balance activity   with minimal and moderate assistance, verbal cues, tactile cues, education, and adaptive equipment to improve sitting balance, standing balance, proprioception, posture, coordination, static balance, and dynamic  balance in order to prepare for functional task, prepare for seated ADLs, prepare for standing ADLs, prepare for functional transfer, increase safety awareness, prepare for discharge home , and prepare for self care..   Self Care (10 minutes): Patient participated in lower body dressing and grooming ADLs in unsupported sitting and standing with moderate verbal, manual, and tactile cueing to increase independence, decrease assistance required, increase activity tolerance, and maintain precautions. Patient also participated in functional mobility, functional transfer, and adaptive equipment training to increase independence, decrease assistance required, increase activity tolerance, increase safety awareness, and maintain precautions.     TREATMENT GRID:  N/A    AFTER TREATMENT PRECAUTIONS: Alarm Activated, Call light within reach, Chair, Needs within reach, and RN notified    INTERDISCIPLINARY COLLABORATION:  RN/ PCT, PT/ PTA, and OT/ MARTINEZ    EDUCATION:  Education Given To: Patient  Education Provided: Role of Therapy;Plan of Care;Precautions;ADL Adaptive Strategies;Transfer Training  Education Method: Verbal  Barriers to Learning: None  Education Outcome: Verbalized understanding;Continued education needed    TOTAL TREATMENT DURATION AND TIME:  Time In: 0908  Time Out: 0933  Minutes: 25    PRATIBHA BARRETT, OT

## 2024-03-07 NOTE — PROGRESS NOTES
ORTHO PROGRESS NOTE    2024    Admit Date: 3/6/2024  Post Op day: 1 Day Post-Op      Subjective:     Jesus Walters is a patient who is now 1 Day Post-Op  and  states that left leg pain is improving. He has the expected post operative pain  .       Objective:     PT/OT:    Progressing     Vital Signs:    Patient Vitals for the past 8 hrs:   BP Temp Temp src Pulse Resp SpO2   24 0750 121/70 98.6 °F (37 °C) Oral 73 17 100 %   24 0558 -- -- -- -- 18 --   24 0358 124/86 97.7 °F (36.5 °C) Oral 82 17 100 %     Temp (24hrs), Av.4 °F (36.9 °C), Min:97.7 °F (36.5 °C), Max:99.3 °F (37.4 °C)      LAB:    No results for input(s): \"HGB\", \"WBC\", \"PLT\" in the last 72 hours.    Physical Exam:    Awake and in no acute distress.  Mood and affect appropriate.  Respirations unlabored and no evidence cyanosis.  Calves nontender.  Abdomen soft and nontender.  Dressing clean/dry  No new neurologic deficit.    Assessment:      1 Day Post-Op STATUS POST Procedure(s):  T10-L3 reinstrumentation.      Plan:     -monitor drain output. Pull drain if less than 40cc/shift    Anticipate discharge to: HOME      Signed By: Hugh Morris MD

## 2024-03-07 NOTE — PLAN OF CARE
Problem: Pain  Goal: Verbalizes/displays adequate comfort level or baseline comfort level  3/6/2024 2231 by Marta Dalton RN  Outcome: Progressing  3/6/2024 2230 by Marta Dalton RN  Outcome: Progressing     Problem: Discharge Planning  Goal: Discharge to home or other facility with appropriate resources  3/6/2024 2231 by Marta Dalton, RN  Outcome: Progressing  3/6/2024 2230 by Marta Dalton RN  Outcome: Progressing  Flowsheets (Taken 3/6/2024 1919)  Discharge to home or other facility with appropriate resources: Identify barriers to discharge with patient and caregiver     Problem: Safety - Adult  Goal: Free from fall injury  Outcome: Progressing     Problem: Skin/Tissue Integrity  Goal: Absence of new skin breakdown  Description: 1.  Monitor for areas of redness and/or skin breakdown  2.  Assess vascular access sites hourly  3.  Every 4-6 hours minimum:  Change oxygen saturation probe site  4.  Every 4-6 hours:  If on nasal continuous positive airway pressure, respiratory therapy assess nares and determine need for appliance change or resting period.  Outcome: Progressing

## 2024-03-07 NOTE — PROGRESS NOTES
ACUTE PHYSICAL THERAPY GOALS:   (Developed with and agreed upon by patient and/or caregiver.)  Pt will perform bed mobility with SBA while maintaining spinal precautions in 7 therapy sessions.  Pt will perform sit-to-stand/ stand-to-sit transfers SBA in 7 therapy sessions.  Pt will ambulate 250 ft SBA with use of LRAD/no device and breaks as needed in 7 therapy sessions.  Pt will tolerate 15 minutes of standing activity with LRAD/no device in 7 therapy sessions.  Pt will perform standing dynamic balance activities with minimal postural sway in 7 therapy sessions.  Pt will tolerate multiple sets and reps of BLE exercises in 7 therapy sessions.    PHYSICAL THERAPY: Daily Note PM   (Link to Caseload Tracking: PT Visit Days : 1  Time In/Out PT Charge Capture  Rehab Caseload Tracker  Orders    Spinal Precautions     Jesus Walters is a 53 y.o. male   PRIMARY DIAGNOSIS: Complications of internal orthopedic device, implant, and graft (HCC)  Complications of internal orthopedic device, implant, and graft (HCC) [T84.9XXA]  S/P lumbar fusion [Z98.1]  Procedure(s) (LRB):  T10-L3 reinstrumentation. (N/A)  1 Day Post-Op  Outpatient in a bed: Payor: NATALIA / Plan: TRINO [facility]ER [facility] / Product Type: *No Product type* /     ASSESSMENT:     REHAB RECOMMENDATIONS:   Recommendation to date pending progress:  Setting:  Short-term Rehab    Equipment:    To Be Determined     ASSESSMENT:  Mr. Walters was found supine and agreeable to PT. Prior to mobility, spinal precautions were reviewed again with pt and pt verbalized understanding. Pt moved from supine to seated EOB with extra time, CGA, cuing for bedrail use/rolling, and good adherence to spinal precautions. Pt was able to ambulate for 2x trials of 100; CGA/[place] with a RW + a seated rest break in between each trial. Pt required light cuing for upright posture, with good carry over the the second ambulation trial. During ambulation, pt appears to have decreased LL stance time,  with BLE step length improving following cues for upright posture and use of RW during BLE swing phase. During today's session, all activity was performed on RA. Pt made fair progress towards goals today, but will continue to be seen by PT in order to address remaining mobility impairments and return pt to [facility]. Pt left seated in relciner with needs in reach.     SUBJECTIVE:   Mr. Walters states, \"I'm sure you guys just have people drop on you all the time\"     Social/Functional Lives With: Other (comment) ([employer]el)  Type of Home:  ([facility])  ADL Assistance: Independent (struggled)  OBJECTIVE:     PAIN: VITALS / O2: PRECAUTION / LINES / DRAINS:   Pre Treatment: Denies pain at rest         Post Treatment: Denies pain at rest Vitals        Oxygen    Hemovac and IV    RESTRICTIONS/PRECAUTIONS:        MOBILITY: I Mod I S SBA CGA Min Mod Max Total  NT x2 Comments:   Bed Mobility    Rolling [] [] [] [] [x] [] [] [] [] [] []    Supine to Sit [] [] [] [] [x] [] [] [] [] [] []    Scooting [] [] [] [] [x] [] [] [] [] [] []    Sit to Supine [] [] [] [] [] [] [] [] [] [x] []    Transfers    Sit to Stand [] [] [] [] [x] [x] [] [] [] [] []    Bed to Chair [] [] [] [] [x] [x] [] [] [] [] []    Stand to Sit [] [] [] [] [x] [x] [] [] [] [] []     [] [] [] [] [] [] [] [] [] [] []    I=Independent, Mod I=Modified Independent, S=Supervision, SBA=Standby Assistance, CGA=Contact Guard Assistance,   Min=Minimal Assistance, Mod=Moderate Assistance, Max=Maximal Assistance, Total=Total Assistance, NT=Not Tested    BALANCE: Good Fair+ Fair Fair- Poor NT Comments   Sitting Static [x] [] [] [] [] []    Sitting Dynamic [x] [] [] [] [] []              Standing Static [] [x] [] [] [] []    Standing Dynamic [] [x] [] [] [] []      GAIT: I Mod I S SBA CGA Min Mod Max Total  NT x2 Comments:   Level of Assistance [] [] [] [] [x] [x] [] [] [] [] []    Distance 2x100  feet    DME Rolling Walker    Gait Quality Decreased rikki , Decreased step clearance,  Decreased step length, Decreased stance, and marked LLE stance time    Weightbearing Status      Stairs      I=Independent, Mod I=Modified Independent, S=Supervision, SBA=Standby Assistance, CGA=Contact Guard Assistance,   Min=Minimal Assistance, Mod=Moderate Assistance, Max=Maximal Assistance, Total=Total Assistance, NT=Not Tested    PLAN:   FREQUENCY AND DURATION: BID for duration of hospital stay or until stated goals are met, whichever comes first.    TREATMENT:   TREATMENT:   Therapeutic Activity (23 Minutes): Therapeutic activity included Rolling, Supine to Sit, Transfer Training, Ambulation on level ground, Sitting balance , and Standing balance to improve functional Activity tolerance, Balance, Coordination, Mobility, Strength, and ROM.    TREATMENT GRID:  N/A    AFTER TREATMENT PRECAUTIONS: Alarm Activated, Bed/Chair Locked, Call light within reach, Chair, and Needs within reach    INTERDISCIPLINARY COLLABORATION:  RN/ PCT and PT/ PTA    EDUCATION: Education Given To: Patient  Education Provided: Role of Therapy    TIME IN/OUT:  Time In: 1422  Time Out: 1446  Minutes: 24    Chano Kohler PT

## 2024-03-08 PROCEDURE — 6370000000 HC RX 637 (ALT 250 FOR IP): Performed by: ORTHOPAEDIC SURGERY

## 2024-03-08 PROCEDURE — 6360000002 HC RX W HCPCS: Performed by: ORTHOPAEDIC SURGERY

## 2024-03-08 PROCEDURE — 97535 SELF CARE MNGMENT TRAINING: CPT

## 2024-03-08 PROCEDURE — 97530 THERAPEUTIC ACTIVITIES: CPT

## 2024-03-08 PROCEDURE — 2580000003 HC RX 258: Performed by: ORTHOPAEDIC SURGERY

## 2024-03-08 RX ADMIN — BISACODYL 5 MG: 5 TABLET, COATED ORAL at 10:00

## 2024-03-08 RX ADMIN — ACETAMINOPHEN 650 MG: 325 TABLET ORAL at 21:27

## 2024-03-08 RX ADMIN — GABAPENTIN 300 MG: 300 CAPSULE ORAL at 10:00

## 2024-03-08 RX ADMIN — DOCUSATE SODIUM 50 MG AND SENNOSIDES 8.6 MG 1 TABLET: 8.6; 5 TABLET, FILM COATED ORAL at 10:00

## 2024-03-08 RX ADMIN — GABAPENTIN 300 MG: 300 CAPSULE ORAL at 21:27

## 2024-03-08 RX ADMIN — DIPHENHYDRAMINE HYDROCHLORIDE 25 MG: 25 CAPSULE ORAL at 21:39

## 2024-03-08 RX ADMIN — SODIUM CHLORIDE, PRESERVATIVE FREE 5 ML: 5 INJECTION INTRAVENOUS at 21:27

## 2024-03-08 RX ADMIN — ACETAMINOPHEN 650 MG: 325 TABLET ORAL at 15:49

## 2024-03-08 RX ADMIN — OXYCODONE HYDROCHLORIDE 10 MG: 5 TABLET ORAL at 10:00

## 2024-03-08 RX ADMIN — FAMOTIDINE 20 MG: 20 TABLET, FILM COATED ORAL at 10:00

## 2024-03-08 RX ADMIN — DOCUSATE SODIUM 50 MG AND SENNOSIDES 8.6 MG 1 TABLET: 8.6; 5 TABLET, FILM COATED ORAL at 21:27

## 2024-03-08 RX ADMIN — OXYCODONE HYDROCHLORIDE 10 MG: 5 TABLET ORAL at 19:03

## 2024-03-08 RX ADMIN — ACETAMINOPHEN 650 MG: 325 TABLET ORAL at 05:54

## 2024-03-08 RX ADMIN — ACETAMINOPHEN 650 MG: 325 TABLET ORAL at 10:00

## 2024-03-08 RX ADMIN — OXYCODONE HYDROCHLORIDE 10 MG: 5 TABLET ORAL at 14:27

## 2024-03-08 RX ADMIN — SODIUM CHLORIDE, PRESERVATIVE FREE 5 ML: 5 INJECTION INTRAVENOUS at 10:06

## 2024-03-08 RX ADMIN — CYCLOBENZAPRINE 10 MG: 10 TABLET, FILM COATED ORAL at 13:21

## 2024-03-08 RX ADMIN — HYDROMORPHONE HYDROCHLORIDE 0.5 MG: 1 INJECTION, SOLUTION INTRAMUSCULAR; INTRAVENOUS; SUBCUTANEOUS at 11:10

## 2024-03-08 RX ADMIN — GABAPENTIN 300 MG: 300 CAPSULE ORAL at 13:21

## 2024-03-08 RX ADMIN — LISINOPRIL 40 MG: 20 TABLET ORAL at 10:00

## 2024-03-08 RX ADMIN — FAMOTIDINE 20 MG: 20 TABLET, FILM COATED ORAL at 21:27

## 2024-03-08 ASSESSMENT — PAIN DESCRIPTION - ONSET
ONSET: GRADUAL

## 2024-03-08 ASSESSMENT — PAIN DESCRIPTION - ORIENTATION
ORIENTATION: MID;POSTERIOR
ORIENTATION: MID;POSTERIOR
ORIENTATION: LOWER;POSTERIOR
ORIENTATION: MID;POSTERIOR

## 2024-03-08 ASSESSMENT — PAIN SCALES - GENERAL
PAINLEVEL_OUTOF10: 10
PAINLEVEL_OUTOF10: 10
PAINLEVEL_OUTOF10: 0
PAINLEVEL_OUTOF10: 0
PAINLEVEL_OUTOF10: 10
PAINLEVEL_OUTOF10: 0

## 2024-03-08 ASSESSMENT — PAIN DESCRIPTION - LOCATION
LOCATION: BACK

## 2024-03-08 ASSESSMENT — PAIN - FUNCTIONAL ASSESSMENT
PAIN_FUNCTIONAL_ASSESSMENT: PREVENTS OR INTERFERES SOME ACTIVE ACTIVITIES AND ADLS

## 2024-03-08 ASSESSMENT — PAIN DESCRIPTION - PAIN TYPE
TYPE: ACUTE PAIN;CHRONIC PAIN;SURGICAL PAIN
TYPE: ACUTE PAIN;SURGICAL PAIN

## 2024-03-08 ASSESSMENT — PAIN DESCRIPTION - FREQUENCY
FREQUENCY: INTERMITTENT
FREQUENCY: INTERMITTENT
FREQUENCY: CONTINUOUS
FREQUENCY: INTERMITTENT

## 2024-03-08 ASSESSMENT — PAIN DESCRIPTION - DESCRIPTORS
DESCRIPTORS: DISCOMFORT

## 2024-03-08 NOTE — PROGRESS NOTES
ORTHOPAEDIC PROGRESS NOTE    2024  Admit Date: 3/6/2024  Admit Diagnosis: Complications of internal orthopedic device, implant, and graft (HCC) [T84.9XXA]  S/P lumbar fusion [Z98.1]  Procedure: Procedure(s):  T10-L3 reinstrumentation.  Post Op day: 2 Days Post-Op      Subjective:     Jesus Walters is a patient who has complaints of difficulty rolling over in bed and getting up on his own. Walked 200 ft yesterday but is recommended to go to Short term rehab. Awaiting placement .       Objective:     Vital Signs:    Blood pressure 119/70, pulse 76, temperature 99.1 °F (37.3 °C), temperature source Oral, resp. rate 17, height 1.778 m (5' 10\"), weight 79.4 kg (175 lb), SpO2 97 %.    Temp (24hrs), Av.8 °F (37.1 °C), Min:98.4 °F (36.9 °C), Max:99.1 °F (37.3 °C)      No intake/output data recorded.   1901 -  0700  In: 2145.9 [P.O.:240; I.V.:1905.9]  Out: 2385 [Urine:2275; Drains:110]    LAB:    No results for input(s): \"HGB\", \"WBC\", \"PLT\" in the last 72 hours.    Physical Exam    General:   Alert and oriented. No acute distress  Lungs:  Respirations unlabored.  Extremities: No evidence of cyanosis. Calves soft, nontender.    Moves both upper and lower extremities.   Dressing:  clean, dry, and intact  Neuro:  no deficit      Assessment:      Patient Active Problem List   Diagnosis    Male hypogonadism    Sleep related bruxism    Hyperlipidemia    PLMD (periodic limb movement disorder)    Depression    Anxiety    Lumbar pain    Persistent disorder of initiating or maintaining sleep    Elevated PSA    Obstructive sleep apnea on CPAP    HTN (hypertension)    Fatigue    Chronic back pain    Arthralgia of left ankle    Mitral valve prolapse    TANVIR (obstructive sleep apnea)    Lumbar burst fracture, closed, initial encounter ([facility])    Lumbar burst fracture (HCC)    Complications of internal orthopedic device, implant, and graft ([facility])    Androgen deficiency    Blood in stool    Difficulty in walking,  not elsewhere classified    Left lower quadrant abdominal pain    Muscle spasm    Muscle weakness (generalized)    Obesity (BMI 30-39.9)    Pain    Pharyngoesophageal dysphagia    Slow transit constipation    Spinal stenosis of lumbar region with neurogenic claudication    Anxiety and depression    Debility    Weakness    Primary hypertension    Closed unstable burst fracture of first lumbar vertebra with routine healing    Wedge compression fracture of first lumbar vertebra, subsequent encounter for fracture with nonunion    S/P lumbar fusion       Plan:     Continue PT/OT  Discontinue: drain Friday    Anticipate Discharge To: SNF once placement arranged       Signed By: Sandy Mclaughlin PA-C

## 2024-03-08 NOTE — PROGRESS NOTES
ACUTE PHYSICAL THERAPY GOALS:   (Developed with and agreed upon by patient and/or caregiver.)  Pt will perform bed mobility with SBA while maintaining spinal precautions in 7 therapy sessions.  Pt will perform sit-to-stand/ stand-to-sit transfers SBA in 7 therapy sessions.  Pt will ambulate 250 ft SBA with use of LRAD/no device and breaks as needed in 7 therapy sessions.  Pt will tolerate 15 minutes of standing activity with LRAD/no device in 7 therapy sessions.  Pt will perform standing dynamic balance activities with minimal postural sway in 7 therapy sessions.  Pt will tolerate multiple sets and reps of BLE exercises in 7 therapy sessions.    PHYSICAL THERAPY: Daily Note PM   (Link to Caseload Tracking: PT Visit Days : 2  Time In/Out PT Charge Capture  Rehab Caseload Tracker  Orders    Spinal Precautions     Jesus Walters is a 53 y.o. male   PRIMARY DIAGNOSIS: Complications of internal orthopedic device, implant, and graft (HCC)  Complications of internal orthopedic device, implant, and graft (HCC) [T84.9XXA]  S/P lumbar fusion [Z98.1]  Procedure(s) (LRB):  T10-L3 reinstrumentation. (N/A)  2 Days Post-Op  Outpatient in a bed: Payor: NATALIA / Plan: TRINO DOOLEYBS ADIN [facility] / Product Type: *No Product type* /     ASSESSMENT:     REHAB RECOMMENDATIONS:   Recommendation to date pending progress:  Setting:  Short-term Rehab    Equipment:    To Be Determined     ASSESSMENT:  Mr. Walters was supine on contact and agreeable to work with therapy. The PT session today focused on supine to sit, amb, LE ex and reviewing precautions.  CGA for bed mobility and to sit up . CGA also needed for amb with RW.  Pt amb slowlly with short steps L > R. Pt recalled 3/3 precautions. Did well with LE ex. Returned to room and sat in chair.      SUBJECTIVE:   Mr. Walters states \"I made it 12 minutes in the chair this morning. I'll try again. \".     Social/Functional Lives With: Other (comment) ([employer])  Type of Home:  ([employer])  ADL

## 2024-03-08 NOTE — CARE COORDINATION
Bed offers discussed, patient accepted bed at [facility]. [facility] to start insurance auth.     Agatha BASS, ACM  [place]

## 2024-03-08 NOTE — PROGRESS NOTES
ACUTE PHYSICAL THERAPY GOALS:   (Developed with and agreed upon by patient and/or caregiver.)  Pt will perform bed mobility with SBA while maintaining spinal precautions in 7 therapy sessions.  Pt will perform sit-to-stand/ stand-to-sit transfers SBA in 7 therapy sessions.  Pt will ambulate 250 ft SBA with use of LRAD/no device and breaks as needed in 7 therapy sessions.  Pt will tolerate 15 minutes of standing activity with LRAD/no device in 7 therapy sessions.  Pt will perform standing dynamic balance activities with minimal postural sway in 7 therapy sessions.  Pt will tolerate multiple sets and reps of BLE exercises in 7 therapy sessions.    PHYSICAL THERAPY: Daily Note AM   (Link to Caseload Tracking: PT Visit Days : 2  Time In/Out PT Charge Capture  Rehab Caseload Tracker  Orders    Spinal Precautions     Jesus Walters is a 53 y.o. male   PRIMARY DIAGNOSIS: Complications of internal orthopedic device, implant, and graft (HCC)  Complications of internal orthopedic device, implant, and graft (HCC) [T84.9XXA]  S/P lumbar fusion [Z98.1]  Procedure(s) (LRB):  T10-L3 reinstrumentation. (N/A)  2 Days Post-Op  Outpatient in a bed: Payor: NATALIA / Plan: TRINO FISHER ADIN [facility] / Product Type: *No Product type* /     ASSESSMENT:     REHAB RECOMMENDATIONS:   Recommendation to date pending progress:  Setting:  Short-term Rehab    Equipment:    To Be Determined     ASSESSMENT:  Mr. Walters was supine on contact and agreeable to work with therapy. The PT session today focused on supine to sit, amb and reviewing precautions.  CGA for bed mobility and to sit up . CGA also needed for amb with RW.  Pt amb slowlly with short steps L > R. Pt recalled 3/3 precautions. Returned to room and sat in chair.      SUBJECTIVE:   Mr. Walters states \"I'll sit up about 10 to 15 minutes\".     Social/Functional Lives With: Other (comment) ([employer])  Type of Home:  ([employer])  ADL Assistance: Independent (struggled)  OBJECTIVE:     PAIN:  VITALS / O2: PRECAUTION / LINES / DRAINS:   Pre Treatment: Denies pain at rest     Some pain with moving    Post Treatment: Denies pain at rest Vitals        Oxygen    None    RESTRICTIONS/PRECAUTIONS:  Position Activity Restriction  Spinal Precautions: No Bending, No Lifting, No Twisting     MOBILITY: I Mod I S SBA CGA Min Mod Max Total  NT x2 Comments:   Bed Mobility    Rolling [] [] [] [] [x] [] [] [] [] [] []    Supine to Sit [] [] [] [] [x] [] [] [] [] [] []    Scooting [] [] [] [] [x] [] [] [] [] [] []    Sit to Supine [] [] [] [] [] [] [] [] [] [x] []    Transfers    Sit to Stand [] [] [] [] [x] [] [] [] [] [] []    Bed to Chair [] [] [] [] [] [] [] [] [] [x] []    Stand to Sit [] [] [] [] [x] [] [] [] [] [] []     [] [] [] [] [] [] [] [] [] [] []    I=Independent, Mod I=Modified Independent, S=Supervision, SBA=Standby Assistance, CGA=Contact Guard Assistance,   Min=Minimal Assistance, Mod=Moderate Assistance, Max=Maximal Assistance, Total=Total Assistance, NT=Not Tested    BALANCE: Good Fair+ Fair Fair- Poor NT Comments   Sitting Static [x] [] [] [] [] []    Sitting Dynamic [x] [] [] [] [] []              Standing Static [] [x] [] [] [] []    Standing Dynamic [] [x] [x] [] [] []      GAIT: I Mod I S SBA CGA Min Mod Max Total  NT x2 Comments:   Level of Assistance [] [] [] [] [x] [] [] [] [] [] []    Distance 75  feet    DME Rolling Walker    Gait Quality Decreased rikki , Decreased step clearance, Decreased step length, and marked LLE stance time    Weightbearing Status      Stairs      I=Independent, Mod I=Modified Independent, S=Supervision, SBA=Standby Assistance, CGA=Contact Guard Assistance,   Min=Minimal Assistance, Mod=Moderate Assistance, Max=Maximal Assistance, Total=Total Assistance, NT=Not Tested    PLAN:   FREQUENCY AND DURATION: BID for duration of hospital stay or until stated goals are met, whichever comes first.    TREATMENT:   TREATMENT:   Therapeutic Activity (17 Minutes): Therapeutic

## 2024-03-08 NOTE — PROGRESS NOTES
ACUTE OCCUPATIONAL THERAPY GOALS:   (Developed with and agreed upon by patient and/or caregiver.)  1. Patient will verbalize and demonstrate understanding of spinal precautions with 100% accuracy during ADLs.   2. Patient will complete lower body bathing and dressing with SBA and adaptive equipment as needed.   3. Patient will complete functional transfers with SUPERVISION and adaptive equipment as needed.   4. Patient will complete toileting and toilet transfer with SUPERVISION.   5. Patient will complete functional mobility of household distances with SUPERVISION and adaptive equipment as needed.   6. Patient will demonstrate ability to log roll in bed with SUPERVISION and MIN verbal cues from therapist. GOAL MET 3/8/24     Timeframe: 7 visits      OCCUPATIONAL THERAPY: Daily Note PM   OT Visit Days: 2   Time In/Out  OT Charge Capture  Rehab Caseload Tracker  OT Orders    Spinal Precautions     Jesus Walters is a 53 y.o. male   PRIMARY DIAGNOSIS: Complications of internal orthopedic device, implant, and graft (HCC)  Complications of internal orthopedic device, implant, and graft (HCC) [T84.9XXA]  S/P lumbar fusion [Z98.1]  Procedure(s) (LRB):  T10-L3 reinstrumentation. (N/A)  2 Days Post-Op  Outpatient in a bed: Payor: NATALIA / Plan: TRINO FISHER ADIN SC / Product Type: *No Product type* /     ASSESSMENT:     REHAB RECOMMENDATIONS:   Recommendation to date pending progress:  Setting:  Short-term Rehab    Equipment:    To Be Determined     ASSESSMENT:  Mr. Walters  is a 54 y/o male presents s/p T10-L3 reinstrumentation and is now on spinal precautions. Today pt presents with decreased activity tolerance, balance, strength and mobility impacting ADLs. Pt overall supervision for bed mobility and with good sitting balance EOB. Pt then ambulated household distances in hallway with CGA RW for dynamic balance. Pt then tolerated prolonged unsupported sitting balance for grooming ADLs EOB. Pt is progressing well  shampoo cap and drying hair EOB, tolerated prolonged unsupported sitting    Personal Device Care [] [] [] [] [] [] [] [] [] [x]    Functional Mobility [] [] [] [] [x] [] [] [] [] [] RW   I=Independent, Mod I=Modified Independent, S=Supervision/Setup, SBA=Standby Assistance, CGA=Contact Guard Assistance, Min=Minimal Assistance, Mod=Moderate Assistance, Max=Maximal Assistance, Total=Total Assistance, NT=Not Tested    BALANCE: Good Fair+ Fair Fair- Poor NT Comments   Sitting Static [x] [] [] [] [] []    Sitting Dynamic [x] [] [] [] [] []              Standing Static [] [x] [] [] [] []    Standing Dynamic [] [] [x] [] [] []        PLAN:     FREQUENCY/DURATION   OT Plan of Care: 3 times/week for duration of hospital stay or until stated goals are met, whichever comes first.    TREATMENT:     TREATMENT:   Self Care (18 minutes): Patient participated in grooming ADLs in unsupported sitting with minimal verbal cueing to increase independence, decrease assistance required, and maintain precautions. Patient also participated in functional mobility, functional transfer, and adaptive equipment training to increase independence, decrease assistance required, increase activity tolerance, increase safety awareness, and maintain precautions.     TREATMENT GRID:  N/A    AFTER TREATMENT PRECAUTIONS: Bed, Call light within reach, Needs within reach, and RN notified    INTERDISCIPLINARY COLLABORATION:  RN/ PCT and OT/ MARTINEZ    EDUCATION:       TOTAL TREATMENT DURATION AND TIME:  Time In: 1510  Time Out: 1528  Minutes: 18    PRATIBHA BARRETT, OT

## 2024-03-08 NOTE — CARE COORDINATION
CM spoke with patient at bedside,. Plan is short term rehab, no bed offers yet. Additional referral set to [facility]. CM made patient aware [employer] may not approved short term rehab. Patient's \" plan b\" is to stay with his sister with [facility] home health.     Agatha BASS, ACM  [place]

## 2024-03-09 PROCEDURE — 2500000003 HC RX 250 WO HCPCS: Performed by: ORTHOPAEDIC SURGERY

## 2024-03-09 PROCEDURE — 97116 GAIT TRAINING THERAPY: CPT

## 2024-03-09 PROCEDURE — 6370000000 HC RX 637 (ALT 250 FOR IP): Performed by: ORTHOPAEDIC SURGERY

## 2024-03-09 PROCEDURE — 2580000003 HC RX 258: Performed by: ORTHOPAEDIC SURGERY

## 2024-03-09 RX ADMIN — GABAPENTIN 300 MG: 300 CAPSULE ORAL at 20:29

## 2024-03-09 RX ADMIN — SODIUM CHLORIDE, PRESERVATIVE FREE 5 ML: 5 INJECTION INTRAVENOUS at 20:29

## 2024-03-09 RX ADMIN — LISINOPRIL 40 MG: 20 TABLET ORAL at 10:08

## 2024-03-09 RX ADMIN — GABAPENTIN 300 MG: 300 CAPSULE ORAL at 10:08

## 2024-03-09 RX ADMIN — OXYCODONE HYDROCHLORIDE 10 MG: 5 TABLET ORAL at 20:29

## 2024-03-09 RX ADMIN — ACETAMINOPHEN 650 MG: 325 TABLET ORAL at 10:08

## 2024-03-09 RX ADMIN — ACETAMINOPHEN 650 MG: 325 TABLET ORAL at 20:33

## 2024-03-09 RX ADMIN — FAMOTIDINE 20 MG: 20 TABLET, FILM COATED ORAL at 10:08

## 2024-03-09 RX ADMIN — TUBERCULIN PURIFIED PROTEIN DERIVATIVE 5 UNITS: 5 INJECTION, SOLUTION INTRADERMAL at 20:30

## 2024-03-09 RX ADMIN — BISACODYL 5 MG: 5 TABLET, COATED ORAL at 10:08

## 2024-03-09 RX ADMIN — DOCUSATE SODIUM 50 MG AND SENNOSIDES 8.6 MG 1 TABLET: 8.6; 5 TABLET, FILM COATED ORAL at 20:29

## 2024-03-09 RX ADMIN — SODIUM CHLORIDE, PRESERVATIVE FREE 5 ML: 5 INJECTION INTRAVENOUS at 10:15

## 2024-03-09 RX ADMIN — GABAPENTIN 300 MG: 300 CAPSULE ORAL at 15:00

## 2024-03-09 RX ADMIN — ACETAMINOPHEN 650 MG: 325 TABLET ORAL at 05:36

## 2024-03-09 RX ADMIN — CYCLOBENZAPRINE 10 MG: 10 TABLET, FILM COATED ORAL at 01:37

## 2024-03-09 RX ADMIN — FAMOTIDINE 20 MG: 20 TABLET, FILM COATED ORAL at 20:29

## 2024-03-09 RX ADMIN — OXYCODONE HYDROCHLORIDE 10 MG: 5 TABLET ORAL at 10:07

## 2024-03-09 RX ADMIN — DOCUSATE SODIUM 50 MG AND SENNOSIDES 8.6 MG 1 TABLET: 8.6; 5 TABLET, FILM COATED ORAL at 10:08

## 2024-03-09 RX ADMIN — OXYCODONE HYDROCHLORIDE 5 MG: 5 TABLET ORAL at 14:59

## 2024-03-09 RX ADMIN — DIPHENHYDRAMINE HYDROCHLORIDE 25 MG: 25 CAPSULE ORAL at 22:25

## 2024-03-09 RX ADMIN — ACETAMINOPHEN 650 MG: 325 TABLET ORAL at 14:58

## 2024-03-09 RX ADMIN — CYCLOBENZAPRINE 10 MG: 10 TABLET, FILM COATED ORAL at 14:59

## 2024-03-09 ASSESSMENT — PAIN - FUNCTIONAL ASSESSMENT
PAIN_FUNCTIONAL_ASSESSMENT: PREVENTS OR INTERFERES SOME ACTIVE ACTIVITIES AND ADLS

## 2024-03-09 ASSESSMENT — PAIN SCALES - GENERAL
PAINLEVEL_OUTOF10: 0
PAINLEVEL_OUTOF10: 9
PAINLEVEL_OUTOF10: 10
PAINLEVEL_OUTOF10: 6

## 2024-03-09 ASSESSMENT — PAIN DESCRIPTION - LOCATION
LOCATION: BACK

## 2024-03-09 ASSESSMENT — PAIN DESCRIPTION - DESCRIPTORS
DESCRIPTORS: DISCOMFORT
DESCRIPTORS: SORE;TENDER;ACHING
DESCRIPTORS: DISCOMFORT;TENDER

## 2024-03-09 ASSESSMENT — PAIN DESCRIPTION - ORIENTATION
ORIENTATION: MID;LOWER;POSTERIOR
ORIENTATION: MID;POSTERIOR
ORIENTATION: MID;POSTERIOR

## 2024-03-09 ASSESSMENT — PAIN DESCRIPTION - PAIN TYPE: TYPE: SURGICAL PAIN

## 2024-03-09 ASSESSMENT — PAIN DESCRIPTION - FREQUENCY: FREQUENCY: INTERMITTENT

## 2024-03-09 ASSESSMENT — PAIN DESCRIPTION - ONSET: ONSET: ON-GOING

## 2024-03-09 NOTE — PLAN OF CARE
Problem: Pain  Goal: Verbalizes/displays adequate comfort level or baseline comfort level  Outcome: Progressing     Problem: Discharge Planning  Goal: Discharge to home or other facility with appropriate resources  Outcome: Progressing     Problem: Safety - Adult  Goal: Free from fall injury  Outcome: Progressing     Problem: Skin/Tissue Integrity  Goal: Absence of new skin breakdown  Outcome: Progressing     Problem: Neurosensory - Adult  Goal: Achieves maximal functionality and self care  Outcome: Progressing     Problem: Respiratory - Adult  Goal: Achieves optimal ventilation and oxygenation  Outcome: Progressing     Problem: Cardiovascular - Adult  Goal: Maintains optimal cardiac output and hemodynamic stability  Outcome: Progressing     Problem: Skin/Tissue Integrity - Adult  Goal: Incisions, wounds, or drain sites healing without S/S of infection  Outcome: Progressing     Problem: Musculoskeletal - Adult  Goal: Return mobility to safest level of function  Outcome: Progressing     Problem: Gastrointestinal - Adult  Goal: Maintains or returns to baseline bowel function  Outcome: Progressing     Problem: Genitourinary - Adult  Goal: Absence of urinary retention  Outcome: Progressing     Problem: Infection - Adult  Goal: Absence of infection at discharge  Outcome: Progressing     Problem: Metabolic/Fluid and Electrolytes - Adult  Goal: Electrolytes maintained within normal limits  Outcome: Progressing  Goal: Glucose maintained within prescribed range  Outcome: Progressing     Problem: Hematologic - Adult  Goal: Maintains hematologic stability  Outcome: Progressing

## 2024-03-09 NOTE — PROGRESS NOTES
ACUTE PHYSICAL THERAPY GOALS:   (Developed with and agreed upon by patient and/or caregiver.)  Pt will perform bed mobility with SBA while maintaining spinal precautions in 7 therapy sessions.  Pt will perform sit-to-stand/ stand-to-sit transfers SBA in 7 therapy sessions.  Pt will ambulate 250 ft SBA with use of LRAD/no device and breaks as needed in 7 therapy sessions.  Pt will tolerate 15 minutes of standing activity with LRAD/no device in 7 therapy sessions.  Pt will perform standing dynamic balance activities with minimal postural sway in 7 therapy sessions.  Pt will tolerate multiple sets and reps of BLE exercises in 7 therapy sessions.    PHYSICAL THERAPY: Daily Note AM   (Link to Caseload Tracking: PT Visit Days : 3  Time In/Out PT Charge Capture  Rehab Caseload Tracker  Orders    Spinal Precautions     Jesus Walters is a 53 y.o. male   PRIMARY DIAGNOSIS: Complications of internal orthopedic device, implant, and graft (HCC)  Complications of internal orthopedic device, implant, and graft (HCC) [T84.9XXA]  S/P lumbar fusion [Z98.1]  Procedure(s) (LRB):  T10-L3 reinstrumentation. (N/A)  3 Days Post-Op  Outpatient in a bed: Payor: NATALIA / Plan: TRINO FISHER ADIN SC / Product Type: *No Product type* /     ASSESSMENT:     REHAB RECOMMENDATIONS:   Recommendation to date pending progress:  Setting:  Short-term Rehab  Vs home with HHPT    Equipment:    To Be Determined     ASSESSMENT:  Mr. Watlers requesting to return to be per RN ~ 7 min after sitting in chair. Sit <> stand with RW and SPV. Ambulated x 100 feet with RW and SPV with slow rikki. Declined practicing stair negotiation with PT. Return to supine in bed with reverse log roll and SPV. Slow progress but may be somewhat self limiting. STR vs HHPT.     SUBJECTIVE:   Mr. 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 Patient required Verbal cueing to improve Activity Pacing and Dynamic Standing Balance.     TREATMENT GRID:  N/A    AFTER TREATMENT PRECAUTIONS: Bed, Bed/Chair Locked, Call light within reach, Needs within reach, RN notified, and Side rails x2    INTERDISCIPLINARY COLLABORATION:  RN/ PCT and PT/ PTA    EDUCATION:      TIME IN/OUT:  Time In: 0850  Time Out: 0901  Minutes: 11    DELIA CASTRO, PT

## 2024-03-09 NOTE — PROGRESS NOTES
ORTHOPAEDIC PROGRESS NOTE    2024  Admit Date: 3/6/2024  Admit Diagnosis: Complications of internal orthopedic device, implant, and graft (HCC) [T84.9XXA]  S/P lumbar fusion [Z98.1]  Procedure: Procedure(s):  T10-L3 reinstrumentation.  Post Op day: 3 Days Post-Op      Subjective:     Jesus Walters is a patient who has complaints of difficulty rolling over in bed and getting up on his own. Walked 100 ft today with PT. [facility]  .       Objective:     Vital Signs:    Blood pressure 122/84, pulse 79, temperature 98.1 °F (36.7 °C), temperature source Oral, resp. rate 15, height 1.778 m (5' 10\"), weight 79.4 kg (175 lb), SpO2 100 %.    Temp (24hrs), Av.1 °F (36.7 °C), Min:98.1 °F (36.7 °C), Max:98.1 °F (36.7 °C)      701 - 1900  In: 240 [P.O.:240]  Out: 350 [Urine:350]   190 -  0700  In: 3074.9 [P.O.:1164; I.V.:1910.9]  Out: 1700 [Urine:1700]    LAB:    No results for input(s): \"HGB\", \"WBC\", \"PLT\" in the last 72 hours.    Physical Exam    General:   Alert and oriented. No acute distress  Lungs:  Respirations unlabored.  Extremities: No evidence of cyanosis. Calves soft, nontender.    Moves both upper and lower extremities.   Dressing:  clean, dry, and intact  Neuro:  no deficit      Assessment:      Patient Active Problem List   Diagnosis    Male hypogonadism    Sleep related bruxism    Hyperlipidemia    PLMD (periodic limb movement disorder)    Depression    Anxiety    Lumbar pain    Persistent disorder of initiating or maintaining sleep    Elevated PSA    Obstructive sleep apnea on CPAP    HTN (hypertension)    Fatigue    Chronic back pain    Arthralgia of left ankle    Mitral valve prolapse    TANVIR (obstructive sleep apnea)    Lumbar burst fracture, closed, initial encounter (HCC)    Lumbar burst fracture (HCC)    Complications of internal orthopedic device, implant, and graft (HCC)    Androgen deficiency     Blood in stool    Difficulty in walking, not elsewhere classified    Left lower quadrant abdominal pain    Muscle spasm    Muscle weakness (generalized)    Obesity (BMI 30-39.9)    Pain    Pharyngoesophageal dysphagia    Slow transit constipation    Spinal stenosis of lumbar region with neurogenic claudication    Anxiety and depression    Debility    Weakness    Primary hypertension    Closed unstable burst fracture of first lumbar vertebra with routine healing    Wedge compression fracture of first lumbar vertebra, subsequent encounter for fracture with nonunion    S/P lumbar fusion       Plan:     Continue PT/OT    Anticipate Discharge To: SNF once placement arranged       Signed By: JAVID Horan

## 2024-03-09 NOTE — PROGRESS NOTES
ACUTE PHYSICAL THERAPY GOALS:   (Developed with and agreed upon by patient and/or caregiver.)  Pt will perform bed mobility with SBA while maintaining spinal precautions in 7 therapy sessions.  Pt will perform sit-to-stand/ stand-to-sit transfers SBA in 7 therapy sessions.  Pt will ambulate 250 ft SBA with use of LRAD/no device and breaks as needed in 7 therapy sessions.  Pt will tolerate 15 minutes of standing activity with LRAD/no device in 7 therapy sessions.  Pt will perform standing dynamic balance activities with minimal postural sway in 7 therapy sessions.  Pt will tolerate multiple sets and reps of BLE exercises in 7 therapy sessions.    PHYSICAL THERAPY: Daily Note AM   (Link to Caseload Tracking: PT Visit Days : 2  Time In/Out PT Charge Capture  Rehab Caseload Tracker  Orders    Spinal Precautions     Jesus Walters is a 53 y.o. male   PRIMARY DIAGNOSIS: Complications of internal orthopedic device, implant, and graft (HCC)  Complications of internal orthopedic device, implant, and graft (HCC) [T84.9XXA]  S/P lumbar fusion [Z98.1]  Procedure(s) (LRB):  T10-L3 reinstrumentation. (N/A)  3 Days Post-Op  Outpatient in a bed: Payor: NATALIA / Plan: TRINO FISHER ADIN SC / Product Type: *No Product type* /     ASSESSMENT:     REHAB RECOMMENDATIONS:   Recommendation to date pending progress:  Setting:  Short-term Rehab  Vs home with HHPT    Equipment:    To Be Determined     ASSESSMENT:  Mr. Walters was supine in bed upon PT arrival to room. Upset that PT woke him. C/o severe pain \"<10/10.\" SPV for bed mobility (log roll) and to sit up. Sit <> stand with RW and SPV. Ambulated x 40 feet with RW and SPV with slow irkki. Poor effort noted when PT assessing muscle strength B LE. Agreed to sit in chair for breakfast but states he is unable to tolerate very long. Slow progress but may be somewhat self limiting. STR vs HHPT.     SUBJECTIVE:   Mr. Walters states \"I can't stay up in the chair for hours at a time\".

## 2024-03-10 LAB
MM INDURATION, POC: 0 MM (ref 0–5)
PPD, POC: NEGATIVE

## 2024-03-10 PROCEDURE — 2580000003 HC RX 258: Performed by: ORTHOPAEDIC SURGERY

## 2024-03-10 PROCEDURE — 97530 THERAPEUTIC ACTIVITIES: CPT

## 2024-03-10 PROCEDURE — 6370000000 HC RX 637 (ALT 250 FOR IP): Performed by: ORTHOPAEDIC SURGERY

## 2024-03-10 PROCEDURE — 97116 GAIT TRAINING THERAPY: CPT

## 2024-03-10 RX ADMIN — ACETAMINOPHEN 650 MG: 325 TABLET ORAL at 05:34

## 2024-03-10 RX ADMIN — ACETAMINOPHEN 650 MG: 325 TABLET ORAL at 21:54

## 2024-03-10 RX ADMIN — OXYCODONE HYDROCHLORIDE 10 MG: 5 TABLET ORAL at 14:07

## 2024-03-10 RX ADMIN — SODIUM CHLORIDE, PRESERVATIVE FREE 5 ML: 5 INJECTION INTRAVENOUS at 09:39

## 2024-03-10 RX ADMIN — GABAPENTIN 300 MG: 300 CAPSULE ORAL at 09:38

## 2024-03-10 RX ADMIN — CYCLOBENZAPRINE 10 MG: 10 TABLET, FILM COATED ORAL at 05:34

## 2024-03-10 RX ADMIN — DIPHENHYDRAMINE HYDROCHLORIDE 25 MG: 25 CAPSULE ORAL at 22:10

## 2024-03-10 RX ADMIN — BISACODYL 5 MG: 5 TABLET, COATED ORAL at 09:38

## 2024-03-10 RX ADMIN — GABAPENTIN 300 MG: 300 CAPSULE ORAL at 14:06

## 2024-03-10 RX ADMIN — GABAPENTIN 300 MG: 300 CAPSULE ORAL at 21:54

## 2024-03-10 RX ADMIN — FAMOTIDINE 20 MG: 20 TABLET, FILM COATED ORAL at 21:54

## 2024-03-10 RX ADMIN — ACETAMINOPHEN 650 MG: 325 TABLET ORAL at 09:38

## 2024-03-10 RX ADMIN — OXYCODONE HYDROCHLORIDE 5 MG: 5 TABLET ORAL at 09:45

## 2024-03-10 RX ADMIN — FAMOTIDINE 20 MG: 20 TABLET, FILM COATED ORAL at 09:38

## 2024-03-10 RX ADMIN — DOCUSATE SODIUM 50 MG AND SENNOSIDES 8.6 MG 1 TABLET: 8.6; 5 TABLET, FILM COATED ORAL at 21:54

## 2024-03-10 RX ADMIN — DOCUSATE SODIUM 50 MG AND SENNOSIDES 8.6 MG 1 TABLET: 8.6; 5 TABLET, FILM COATED ORAL at 09:38

## 2024-03-10 RX ADMIN — CYCLOBENZAPRINE 10 MG: 10 TABLET, FILM COATED ORAL at 18:33

## 2024-03-10 RX ADMIN — ACETAMINOPHEN 650 MG: 325 TABLET ORAL at 18:33

## 2024-03-10 RX ADMIN — LISINOPRIL 40 MG: 20 TABLET ORAL at 09:38

## 2024-03-10 ASSESSMENT — PAIN SCALES - GENERAL
PAINLEVEL_OUTOF10: 6
PAINLEVEL_OUTOF10: 0
PAINLEVEL_OUTOF10: 0
PAINLEVEL_OUTOF10: 7

## 2024-03-10 ASSESSMENT — PAIN DESCRIPTION - DESCRIPTORS
DESCRIPTORS: DISCOMFORT
DESCRIPTORS: DISCOMFORT;THROBBING

## 2024-03-10 ASSESSMENT — PAIN DESCRIPTION - PAIN TYPE: TYPE: SURGICAL PAIN

## 2024-03-10 ASSESSMENT — PAIN DESCRIPTION - LOCATION
LOCATION: BACK
LOCATION: BACK

## 2024-03-10 ASSESSMENT — PAIN DESCRIPTION - FREQUENCY: FREQUENCY: INTERMITTENT

## 2024-03-10 ASSESSMENT — PAIN DESCRIPTION - ORIENTATION
ORIENTATION: POSTERIOR;MID;LOWER
ORIENTATION: MID;POSTERIOR

## 2024-03-10 ASSESSMENT — PAIN DESCRIPTION - ONSET: ONSET: GRADUAL

## 2024-03-10 NOTE — PROGRESS NOTES
ORTHOPAEDIC PROGRESS NOTE    March 10, 2024  Admit Date: 3/6/2024  Admit Diagnosis: Complications of internal orthopedic device, implant, and graft (HCC) [T84.9XXA]  S/P lumbar fusion [Z98.1]  Procedure: Procedure(s):  T10-L3 reinstrumentation.  Post Op day: 4 Days Post-Op      Subjective:     Jesus Walters is a patient who reports some improvement today and his pain to ambulate some with physical therapy yesterday and has done better with rolling over in bed.       Objective:     Vital Signs:    Blood pressure 116/77, pulse 76, temperature 99 °F (37.2 °C), temperature source Oral, resp. rate 16, height 1.778 m (5' 10\"), weight 79.4 kg (175 lb), SpO2 94 %.    Temp (24hrs), Av.2 °F (37.3 °C), Min:99 °F (37.2 °C), Max:99.3 °F (37.4 °C)      No intake/output data recorded.   1901 - 03/10 0700  In: 1174 [P.O.:1164; I.V.:10]  Out: 2550 [Urine:2550]    LAB:    No results for input(s): \"HGB\", \"WBC\", \"PLT\" in the last 72 hours.    Physical Exam    General:   Alert and oriented. No acute distress  Lungs:  Respirations unlabored.  Extremities: No evidence of cyanosis. Calves soft, nontender.    Moves both upper and lower extremities.   Dressing:  clean, dry, and intact  Neuro:  no deficit      Assessment:      Patient Active Problem List   Diagnosis    Male hypogonadism    Sleep related bruxism    Hyperlipidemia    PLMD (periodic limb movement disorder)    Depression    Anxiety    Lumbar pain    Persistent disorder of initiating or maintaining sleep    Elevated PSA    Obstructive sleep apnea on CPAP    HTN (hypertension)    Fatigue    Chronic back pain    Arthralgia of left ankle    Mitral valve prolapse    TANVIR (obstructive sleep apnea)    Lumbar burst fracture, closed, initial encounter ([facility])    Lumbar burst fracture (HCC)    Complications of internal orthopedic device, implant, and graft ([facility])    Androgen deficiency    Blood in stool    Difficulty in walking, not elsewhere classified    Left lower quadrant

## 2024-03-10 NOTE — PROGRESS NOTES
ACUTE PHYSICAL THERAPY GOALS:   (Developed with and agreed upon by patient and/or caregiver.)  Pt will perform bed mobility with SBA while maintaining spinal precautions in 7 therapy sessions.  Pt will perform sit-to-stand/ stand-to-sit transfers SBA in 7 therapy sessions.  Pt will ambulate 250 ft SBA with use of LRAD/no device and breaks as needed in 7 therapy sessions.  Pt will tolerate 15 minutes of standing activity with LRAD/no device in 7 therapy sessions.  Pt will perform standing dynamic balance activities with minimal postural sway in 7 therapy sessions.  Pt will tolerate multiple sets and reps of BLE exercises in 7 therapy sessions.    PHYSICAL THERAPY: Daily Note PM   (Link to Caseload Tracking: PT Visit Days : 4  Time In/Out PT Charge Capture  Rehab Caseload Tracker  Orders    Spinal Precautions     Jesus Walters is a 53 y.o. male   PRIMARY DIAGNOSIS: Complications of internal orthopedic device, implant, and graft (HCC)  Complications of internal orthopedic device, implant, and graft (HCC) [T84.9XXA]  S/P lumbar fusion [Z98.1]  Procedure(s) (LRB):  T10-L3 reinstrumentation. (N/A)  4 Days Post-Op  Outpatient in a bed: Payor: NATALIA / Plan: [facility] / Product Type: *No Product type* /     ASSESSMENT:     REHAB RECOMMENDATIONS:   Recommendation to date pending progress:  Setting:  STR    Equipment:    To Be Determined     ASSESSMENT:  Mr. Walters seen this PM for PT treatment. Agreeable to PT. Reports increased pain after ambulation. Bed mobility completed with log roll and SPV. Sit <> stand with RW and SPV. Ambulated x 100 feet with RW and SPV with slow rikki. Return to supine in bed with reverse log roll and SPV. Continue PT while inpatient. Possible transition to STR Monday per pt.     SUBJECTIVE:   Mr. Walters states \"Can you help me fix this gown?\"    Social/Functional Lives With: Other (comment) ([employer])  Type of Home:  ([employer]el)  ADL Assistance: Independent (struggled)  OBJECTIVE:      PAIN: VITALS / O2: PRECAUTION / LINES / DRAINS:   Pre Treatment: 6/10  Post Treatment: 7/10 after ambulation Vitals        Oxygen  RA  None    RESTRICTIONS/PRECAUTIONS:  Position Activity Restriction  Spinal Precautions: No Bending, No Lifting, No Twisting     MOBILITY: I Mod I S SBA CGA Min Mod Max Total  NT x2 Comments:   Bed Mobility    Rolling [] [] [x] [] [] [] [] [] [] [] [] Log roll   Supine to Sit [] [] [x] [] [] [] [] [] [] [] []    Scooting [] [] [x] [] [] [] [] [] [] [] []    Sit to Supine [] [] [x] [] [] [] [] [] [] [] [] Reverse log roll   Transfers    Sit to Stand [] [] [x] [] [] [] [] [] [] [] []    Bed to Chair [] [] [] [] [] [] [] [] [] [x] []    Stand to Sit [] [] [x] [] [] [] [] [] [] [] []     [] [] [] [] [] [] [] [] [] [] []    I=Independent, Mod I=Modified Independent, S=Supervision, SBA=Standby Assistance, CGA=Contact Guard Assistance,   Min=Minimal Assistance, Mod=Moderate Assistance, Max=Maximal Assistance, Total=Total Assistance, NT=Not Tested    BALANCE: Good Fair+ Fair Fair- Poor NT Comments   Sitting Static [x] [] [] [] [] []    Sitting Dynamic [x] [] [] [] [] []              Standing Static [] [x] [] [] [] []    Standing Dynamic [] [x] [] [] [] []      GAIT: I Mod I S SBA CGA Min Mod Max Total  NT x2 Comments:   Level of Assistance [] [] [x] [] [] [] [] [] [] [] []    Distance 100  feet    DME Rolling Walker    Gait Quality Antalgic, Decreased rikki , and Decreased step length    Weightbearing Status      Stairs NT     I=Independent, Mod I=Modified Independent, S=Supervision, SBA=Standby Assistance, CGA=Contact Guard Assistance,   Min=Minimal Assistance, Mod=Moderate Assistance, Max=Maximal Assistance, Total=Total Assistance, NT=Not Tested    PLAN:   FREQUENCY AND DURATION: BID for duration of hospital stay or until stated goals are met, whichever comes first.    TREATMENT:   TREATMENT:   Gait Training (15 Minutes): Gait training for 100 feet utilizing Rolling Walker. Patient  required Verbal cueing to improve Activity Pacing and Dynamic Standing Balance.     TREATMENT GRID:  N/A    AFTER TREATMENT PRECAUTIONS: Bed, Bed/Chair Locked, Call light within reach, Needs within reach, RN notified, and Side rails x2    INTERDISCIPLINARY COLLABORATION:  RN/ PCT and PT/ PTA    EDUCATION:      TIME IN/OUT:  Time In: 1615  Time Out: 1630  Minutes: 15    DELIA CASTRO, PT

## 2024-03-10 NOTE — PROGRESS NOTES
The beginning of Daylight Saving Time occurred at 0200 hrs. Documentation of patient care and medications administered is done with respect to the time change.

## 2024-03-10 NOTE — PROGRESS NOTES
ACUTE PHYSICAL THERAPY GOALS:   (Developed with and agreed upon by patient and/or caregiver.)  Pt will perform bed mobility with SBA while maintaining spinal precautions in 7 therapy sessions.  Pt will perform sit-to-stand/ stand-to-sit transfers SBA in 7 therapy sessions.  Pt will ambulate 250 ft SBA with use of LRAD/no device and breaks as needed in 7 therapy sessions.  Pt will tolerate 15 minutes of standing activity with LRAD/no device in 7 therapy sessions.  Pt will perform standing dynamic balance activities with minimal postural sway in 7 therapy sessions.  Pt will tolerate multiple sets and reps of BLE exercises in 7 therapy sessions.    PHYSICAL THERAPY: Daily Note PM   (Link to Caseload Tracking: PT Visit Days : 4  Time In/Out PT Charge Capture  Rehab Caseload Tracker  Orders    Spinal Precautions     Jesus Walters is a 53 y.o. male   PRIMARY DIAGNOSIS: Complications of internal orthopedic device, implant, and graft (HCC)  Complications of internal orthopedic device, implant, and graft (HCC) [T84.9XXA]  S/P lumbar fusion [Z98.1]  Procedure(s) (LRB):  T10-L3 reinstrumentation. (N/A)  4 Days Post-Op  Outpatient in a bed: Payor: NATALIA / Plan: [facility]INO [facility] / Product Type: *No Product type* /     ASSESSMENT:     REHAB RECOMMENDATIONS:   Recommendation to date pending progress:  Setting:  STR    Equipment:    To Be Determined     ASSESSMENT:  Mr. Walters seen late AM for PT treatment. In good spirits today. Agreeable to PT. Bed mobility completed with log roll and SPV. Sit <> stand with RW and SPV. Ambulated x 100 feet with RW and SPV with slow rikki. Sat EOB x ~10 min with good static/dynamic sit balance to complete ADLs/hygiene per request. Return to supine in bed with reverse log roll and SPV. Continue PT while inpatient. Possible transition to STR Monday per pt.     SUBJECTIVE:   Mr. Walters states \"Can I sit at the edge of bed and brush my teeth?\"    Social/Functional Lives With: Other    Therapeutic Activity (15 Minutes): Therapeutic activity included Rolling, Supine to Sit, Sit to Supine, Scooting, Transfer Training, Sitting balance , and Standing balance to improve functional Activity tolerance, Balance, Coordination, Mobility, and Strength.  Gait Training (9 Minutes): Gait training for 100 feet utilizing Rolling Walker. Patient required Verbal cueing to improve Activity Pacing and Dynamic Standing Balance.     TREATMENT GRID:  N/A    AFTER TREATMENT PRECAUTIONS: Bed, Bed/Chair Locked, Call light within reach, Needs within reach, RN notified, and Side rails x2    INTERDISCIPLINARY COLLABORATION:  RN/ PCT and PT/ PTA    EDUCATION:      TIME IN/OUT:  Time In: 1204  Time Out: 1228  Minutes: 24    DELIA CASTRO, PT

## 2024-03-11 LAB
MM INDURATION, POC: 0 MM (ref 0–5)
PPD, POC: NEGATIVE

## 2024-03-11 PROCEDURE — 97535 SELF CARE MNGMENT TRAINING: CPT

## 2024-03-11 PROCEDURE — 97530 THERAPEUTIC ACTIVITIES: CPT

## 2024-03-11 PROCEDURE — 6370000000 HC RX 637 (ALT 250 FOR IP): Performed by: ORTHOPAEDIC SURGERY

## 2024-03-11 PROCEDURE — 97116 GAIT TRAINING THERAPY: CPT

## 2024-03-11 RX ADMIN — DOCUSATE SODIUM 50 MG AND SENNOSIDES 8.6 MG 1 TABLET: 8.6; 5 TABLET, FILM COATED ORAL at 20:43

## 2024-03-11 RX ADMIN — CYCLOBENZAPRINE 10 MG: 10 TABLET, FILM COATED ORAL at 17:22

## 2024-03-11 RX ADMIN — DIPHENHYDRAMINE HYDROCHLORIDE 25 MG: 25 CAPSULE ORAL at 17:21

## 2024-03-11 RX ADMIN — GABAPENTIN 300 MG: 300 CAPSULE ORAL at 09:43

## 2024-03-11 RX ADMIN — ACETAMINOPHEN 650 MG: 325 TABLET ORAL at 15:39

## 2024-03-11 RX ADMIN — FAMOTIDINE 20 MG: 20 TABLET, FILM COATED ORAL at 20:43

## 2024-03-11 RX ADMIN — GABAPENTIN 300 MG: 300 CAPSULE ORAL at 14:33

## 2024-03-11 RX ADMIN — OXYCODONE HYDROCHLORIDE 10 MG: 5 TABLET ORAL at 15:39

## 2024-03-11 RX ADMIN — ACETAMINOPHEN 650 MG: 325 TABLET ORAL at 09:47

## 2024-03-11 RX ADMIN — GABAPENTIN 300 MG: 300 CAPSULE ORAL at 20:43

## 2024-03-11 RX ADMIN — BISACODYL 5 MG: 5 TABLET, COATED ORAL at 09:43

## 2024-03-11 RX ADMIN — LISINOPRIL 40 MG: 20 TABLET ORAL at 09:43

## 2024-03-11 RX ADMIN — FAMOTIDINE 20 MG: 20 TABLET, FILM COATED ORAL at 09:43

## 2024-03-11 RX ADMIN — DOCUSATE SODIUM 50 MG AND SENNOSIDES 8.6 MG 1 TABLET: 8.6; 5 TABLET, FILM COATED ORAL at 09:43

## 2024-03-11 RX ADMIN — ACETAMINOPHEN 650 MG: 325 TABLET ORAL at 05:30

## 2024-03-11 RX ADMIN — ACETAMINOPHEN 650 MG: 325 TABLET ORAL at 20:42

## 2024-03-11 RX ADMIN — CYCLOBENZAPRINE 10 MG: 10 TABLET, FILM COATED ORAL at 05:30

## 2024-03-11 ASSESSMENT — PAIN SCALES - GENERAL
PAINLEVEL_OUTOF10: 0
PAINLEVEL_OUTOF10: 0
PAINLEVEL_OUTOF10: 7

## 2024-03-11 ASSESSMENT — PAIN DESCRIPTION - DESCRIPTORS: DESCRIPTORS: ACHING;SORE;PRESSURE

## 2024-03-11 ASSESSMENT — PAIN DESCRIPTION - LOCATION: LOCATION: BACK

## 2024-03-11 ASSESSMENT — PAIN DESCRIPTION - ORIENTATION: ORIENTATION: MID

## 2024-03-11 NOTE — PROGRESS NOTES
ACUTE PHYSICAL THERAPY GOALS:   (Developed with and agreed upon by patient and/or caregiver.)  Pt will perform bed mobility with SBA while maintaining spinal precautions in 7 therapy sessions.  Pt will perform sit-to-stand/ stand-to-sit transfers SBA in 7 therapy sessions.  Pt will ambulate 250 ft SBA with use of LRAD/no device and breaks as needed in 7 therapy sessions.  Pt will tolerate 15 minutes of standing activity with LRAD/no device in 7 therapy sessions.  Pt will perform standing dynamic balance activities with minimal postural sway in 7 therapy sessions.  Pt will tolerate multiple sets and reps of BLE exercises in 7 therapy sessions.    PHYSICAL THERAPY: Daily Note PM   (Link to Caseload Tracking: PT Visit Days : 5  Time In/Out PT Charge Capture  Rehab Caseload Tracker  Orders    Spinal Precautions     Jesus Walters is a 53 y.o. male   PRIMARY DIAGNOSIS: Complications of internal orthopedic device, implant, and graft (HCC)  Complications of internal orthopedic device, implant, and graft (HCC) [T84.9XXA]  S/P lumbar fusion [Z98.1]  Procedure(s) (LRB):  T10-L3 reinstrumentation. (N/A)  5 Days Post-Op  Outpatient in a bed: Payor: NATALIA / Plan: [facility] / Product Type: *No Product type* /     ASSESSMENT:     REHAB RECOMMENDATIONS:   Recommendation to date pending progress:  Setting:  STR    Equipment:    To Be Determined     ASSESSMENT:  Mr. Walters remains about the same this PM.  He reported feeling better initially, and performed bed mobility with CGA.  He then ambulated using the RW for 100'. He did take several standing breaks and needed close guard as at times he appeared to have knee buckling, and other times very forward flexed posture with the walker extended in front of him.  Frequent cueing provided for gait technique.      SUBJECTIVE:   Mr. Walters states \"I'll try to sit here on the edge of the bed\"    Social/Functional Lives With: Other (comment) ([employer])  Type of Home:   training for 100 feet utilizing Rolling Walker. Patient required Tactile and Verbal cueing to improve Activity Pacing, Assistive Device Utilization, Dynamic Standing Balance, and Gait Mechanics.     TREATMENT GRID:  N/A    AFTER TREATMENT PRECAUTIONS: Bed, Bed/Chair Locked, Call light within reach, Needs within reach, and RN notified    INTERDISCIPLINARY COLLABORATION:  RN/ PCT and PT/ PTA    EDUCATION:      TIME IN/OUT:  Time In: 1327  Time Out: 1337  Minutes: 10    Ava Hernandez PTA

## 2024-03-11 NOTE — PROGRESS NOTES
ACUTE PHYSICAL THERAPY GOALS:   (Developed with and agreed upon by patient and/or caregiver.)  Pt will perform bed mobility with SBA while maintaining spinal precautions in 7 therapy sessions.  Pt will perform sit-to-stand/ stand-to-sit transfers SBA in 7 therapy sessions.  Pt will ambulate 250 ft SBA with use of LRAD/no device and breaks as needed in 7 therapy sessions.  Pt will tolerate 15 minutes of standing activity with LRAD/no device in 7 therapy sessions.  Pt will perform standing dynamic balance activities with minimal postural sway in 7 therapy sessions.  Pt will tolerate multiple sets and reps of BLE exercises in 7 therapy sessions.    PHYSICAL THERAPY: Daily Note AM   (Link to Caseload Tracking: PT Visit Days : 5  Time In/Out PT Charge Capture  Rehab Caseload Tracker  Orders    Spinal Precautions     Jesus Walters is a 53 y.o. male   PRIMARY DIAGNOSIS: Complications of internal orthopedic device, implant, and graft (HCC)  Complications of internal orthopedic device, implant, and graft (HCC) [T84.9XXA]  S/P lumbar fusion [Z98.1]  Procedure(s) (LRB):  T10-L3 reinstrumentation. (N/A)  5 Days Post-Op  Outpatient in a bed: Payor: NATALIA / Plan: [facility] / Product Type: *No Product type* /     ASSESSMENT:     REHAB RECOMMENDATIONS:   Recommendation to date pending progress:  Setting:  STR    Equipment:    To Be Determined     ASSESSMENT:  Mr. Walters is making very little progress toward goals  with minimal improvement in mobility.  The patient performed log roll,  supine to sit and transfers with supervision.  He stood and ambulated a brief distance with an irregular gait pattern.  He is able to correct posture with cueing, but then also tends lean to far back and give at the knees at times. He declined sitting in the chair today.     SUBJECTIVE:   Mr. 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)  ADL Assistance: Independent (struggled)  OBJECTIVE:     PAIN: VITALS / O2: PRECAUTION / LINES / DRAINS:   Pre Treatment: 6/10  Post Treatment: 7/10 after ambulation Vitals        Oxygen  RA  None    RESTRICTIONS/PRECAUTIONS:  Position Activity Restriction  Spinal Precautions: No Bending, No Lifting, No Twisting     MOBILITY: I Mod I S SBA CGA Min Mod Max Total  NT x2 Comments:   Bed Mobility    Rolling [] [] [x] [] [] [] [] [] [] [] [] Log roll   Supine to Sit [] [] [x] [] [] [] [] [] [] [] []    Scooting [] [] [x] [] [] [] [] [] [] [] []    Sit to Supine [] [] [x] [] [] [] [] [] [] [] [] Reverse log roll   Transfers    Sit to Stand [] [] [x] [] [] [] [] [] [] [] []    Bed to Chair [] [] [] [] [] [] [] [] [] [x] []    Stand to Sit [] [] [x] [] [] [] [] [] [] [] []     [] [] [] [] [] [] [] [] [] [] []    I=Independent, Mod I=Modified Independent, S=Supervision, SBA=Standby Assistance, CGA=Contact Guard Assistance,   Min=Minimal Assistance, Mod=Moderate Assistance, Max=Maximal Assistance, Total=Total Assistance, NT=Not Tested    BALANCE: Good Fair+ Fair Fair- Poor NT Comments   Sitting Static [x] [] [] [] [] []    Sitting Dynamic [x] [] [] [] [] []              Standing Static [] [x] [] [] [] []    Standing Dynamic [] [x] [] [] [] []      GAIT: I Mod I S SBA CGA Min Mod Max Total  NT x2 Comments:   Level of Assistance [] [] [x] [] [] [] [] [] [] [] []    Distance 100  feet    DME Rolling Walker    Gait Quality Antalgic, Decreased rikki , and Decreased step length    Weightbearing Status      Stairs NT     I=Independent, Mod I=Modified Independent, S=Supervision, SBA=Standby Assistance, CGA=Contact Guard Assistance,   Min=Minimal Assistance, Mod=Moderate Assistance, Max=Maximal Assistance, Total=Total Assistance, NT=Not Tested    PLAN:   FREQUENCY AND DURATION: BID for duration of hospital stay or until stated goals are met, whichever comes first.    TREATMENT:   TREATMENT:   Therapeutic Activity (16  Minutes): Therapeutic activity included Rolling, Supine to Sit, Sit to Supine, Scooting, Transfer Training, Ambulation on level ground, and Sitting balance  to improve functional Activity tolerance, Balance, Mobility, and Strength.    TREATMENT GRID:  N/A    AFTER TREATMENT PRECAUTIONS: Bed, Bed/Chair Locked, Call light within reach, Needs within reach, RN notified, and Side rails x2    INTERDISCIPLINARY COLLABORATION:  RN/ PCT and PT/ PTA    EDUCATION:      TIME IN/OUT:  Time In: 1021  Time Out: 1037  Minutes: 16    Ava Hernandez, PTA

## 2024-03-11 NOTE — PROGRESS NOTES
ACUTE OCCUPATIONAL THERAPY GOALS:   (Developed with and agreed upon by patient and/or caregiver.)  1. Patient will verbalize and demonstrate understanding of spinal precautions with 100% accuracy during ADLs.   2. Patient will complete lower body bathing and dressing with SBA and adaptive equipment as needed.   3. Patient will complete functional transfers with SUPERVISION and adaptive equipment as needed.   4. Patient will complete toileting and toilet transfer with SUPERVISION.   5. Patient will complete functional mobility of household distances with SUPERVISION and adaptive equipment as needed.   6. Patient will demonstrate ability to log roll in bed with SUPERVISION and MIN verbal cues from therapist. GOAL MET 3/8/24     Timeframe: 7 visits      OCCUPATIONAL THERAPY: Daily Note PM   OT Visit Days: 3   Time In/Out  OT Charge Capture  Rehab Caseload Tracker  OT Orders    Spinal Precautions     Jesus Walters is a 53 y.o. male   PRIMARY DIAGNOSIS: Complications of internal orthopedic device, implant, and graft (HCC)  Complications of internal orthopedic device, implant, and graft (HCC) [T84.9XXA]  S/P lumbar fusion [Z98.1]  Procedure(s) (LRB):  T10-L3 reinstrumentation. (N/A)  5 Days Post-Op  Outpatient in a bed: Payor: NATALIA / Plan: [facility]INO [facility] / Product Type: *No Product type* /     ASSESSMENT:     REHAB RECOMMENDATIONS:   Recommendation to date pending progress:  Setting:  Short-term Rehab    Equipment:    To Be Determined     ASSESSMENT:  Mr. Walters is doing well today. Pt presents supine upon arrival. Pt is performing transfers/mobility in room with SBA. Pt was able to complete self care tasks while seated/standing at sink. Required more assistance with LB bathing. Has AE from previous surgery. Pt left with PTA in room. Making progress with goals. Will continue to benefit from skilled OT during stay.       SUBJECTIVE:     Mr. Walters states, \"I was hurting earlier\"     Social/Functional  Lives With: Other (comment) (Motel)  Type of Home:  ([facility])  ADL Assistance: Independent (struggled)    OBJECTIVE:     LINES / DRAINS / AIRWAY: None    RESTRICTIONS/PRECAUTIONS:  Restrictions/Precautions  Restrictions/Precautions: Fall Risk, Bed Alarm  Position Activity Restriction  Spinal Precautions: No Bending, No Lifting, No Twisting        PAIN: VITALS / O2:   Pre Treatment:    8/10 pain in lower back        Post Treatment: 9/10 pain, resting comfortably in supine Vitals          Oxygen        MOBILITY: I Mod I S SBA CGA Min Mod Max Total  NT x2 Comments:   Bed Mobility    Rolling [] [] [x] [] [] [] [] [] [] [] []    Supine to Sit [] [] [x] [] [] [] [] [] [] [] [] Log roll   Scooting [] [] [x] [] [] [] [] [] [] [] []    Sit to Supine [] [] [x] [] [] [] [] [] [] [] [] Reverse log roll   Transfers    Sit to Stand [] [] [] [x] [] [] [] [] [] [] [] RW   Bed to Chair [] [] [] [x] [] [] [] [] [] [] [] RW   Stand to Sit [] [] [] [x] [] [] [] [] [] [] [] RW   Tub/Shower [] [] [] [] [] [] [] [] [] [x] []     Toilet [] [] [] [] [] [] [] [] [] [x] []      [] [] [] [] [] [] [] [] [] [] []    I=Independent, Mod I=Modified Independent, S=Supervision/Setup, SBA=Standby Assistance, CGA=Contact Guard Assistance, Min=Minimal Assistance, Mod=Moderate Assistance, Max=Maximal Assistance, Total=Total Assistance, NT=Not Tested    ACTIVITIES OF DAILY LIVING: I Mod I S SBA CGA Min Mod Max Total NT Comments   BASIC ADLs:              Upper Body   Bathing [] [] [x] [] [] [] [] [] [] []     Lower Body Bathing [] [] [] [] [] [x] [] [] [] []     Toileting [] [] [] [] [] [] [] [] [] [x]    Upper Body Dressing [] [] [x] [] [] [] [] [] [] []    Lower Body Dressing [] [] [] [x] [] [] [] [] [] []    Feeding [] [] [] [] [] [] [] [] [] []    Grooming [] [] [x] [] [] [] [] [] [] []    Personal Device Care [] [] [] [] [] [] [] [] [] [x]    Functional Mobility [] [] [] [x] [] [] [] [] [] []    I=Independent, Mod I=Modified Independent,

## 2024-03-11 NOTE — PROGRESS NOTES
ORTHO PROGRESS NOTE    2024    Admit Date: 3/6/2024  Post Op day: 5 Days Post-Op      Subjective:     Jesus Walters is a patient who is now 5 Days Post-Op  and has no complaints.       Objective:     PT/OT:    Progressing     Vital Signs:    Patient Vitals for the past 8 hrs:   BP Temp Temp src Pulse Resp SpO2   24 0800 (!) 142/83 99.3 °F (37.4 °C) Oral 91 18 98 %     Temp (24hrs), Av.6 °F (37.6 °C), Min:98.8 °F (37.1 °C), Max:100.6 °F (38.1 °C)      LAB:    No results for input(s): \"HGB\", \"WBC\", \"PLT\" in the last 72 hours.    Physical Exam:    Awake and in no acute distress.  Mood and affect appropriate.  Respirations unlabored and no evidence cyanosis.  Calves nontender.  Abdomen soft and nontender.  Dressing clean/dry  No new neurologic deficit.    Assessment:      5 Days Post-Op STATUS POST Procedure(s):  T10-L3 reinstrumentation.      Plan:     Awaiting placement for rehab  PT/OT      Anticipate discharge to: SNF      Signed By: Hugh Morris MD

## 2024-03-12 PROBLEM — Z98.1 S/P FUSION OF THORACIC SPINE: Status: ACTIVE | Noted: 2024-03-12

## 2024-03-12 PROCEDURE — 1100000000 HC RM PRIVATE

## 2024-03-12 PROCEDURE — 6360000002 HC RX W HCPCS: Performed by: ORTHOPAEDIC SURGERY

## 2024-03-12 PROCEDURE — 97535 SELF CARE MNGMENT TRAINING: CPT

## 2024-03-12 PROCEDURE — 6370000000 HC RX 637 (ALT 250 FOR IP): Performed by: ORTHOPAEDIC SURGERY

## 2024-03-12 PROCEDURE — 97530 THERAPEUTIC ACTIVITIES: CPT

## 2024-03-12 PROCEDURE — 2580000003 HC RX 258: Performed by: ORTHOPAEDIC SURGERY

## 2024-03-12 RX ORDER — POLYETHYLENE GLYCOL 3350 17 G/17G
17 POWDER, FOR SOLUTION ORAL DAILY
Status: DISCONTINUED | OUTPATIENT
Start: 2024-03-12 | End: 2024-03-13 | Stop reason: HOSPADM

## 2024-03-12 RX ADMIN — GABAPENTIN 300 MG: 300 CAPSULE ORAL at 09:14

## 2024-03-12 RX ADMIN — ACETAMINOPHEN 650 MG: 325 TABLET ORAL at 09:17

## 2024-03-12 RX ADMIN — SODIUM CHLORIDE, PRESERVATIVE FREE 10 ML: 5 INJECTION INTRAVENOUS at 20:19

## 2024-03-12 RX ADMIN — DOCUSATE SODIUM 50 MG AND SENNOSIDES 8.6 MG 1 TABLET: 8.6; 5 TABLET, FILM COATED ORAL at 20:17

## 2024-03-12 RX ADMIN — DIPHENHYDRAMINE HYDROCHLORIDE 25 MG: 25 CAPSULE ORAL at 09:18

## 2024-03-12 RX ADMIN — ACETAMINOPHEN 650 MG: 325 TABLET ORAL at 22:20

## 2024-03-12 RX ADMIN — LISINOPRIL 40 MG: 20 TABLET ORAL at 09:14

## 2024-03-12 RX ADMIN — FAMOTIDINE 20 MG: 20 TABLET, FILM COATED ORAL at 09:14

## 2024-03-12 RX ADMIN — ACETAMINOPHEN 650 MG: 325 TABLET ORAL at 15:37

## 2024-03-12 RX ADMIN — POLYETHYLENE GLYCOL 3350 17 G: 17 POWDER, FOR SOLUTION ORAL at 11:31

## 2024-03-12 RX ADMIN — OXYCODONE HYDROCHLORIDE 10 MG: 5 TABLET ORAL at 07:07

## 2024-03-12 RX ADMIN — BISACODYL 5 MG: 5 TABLET, COATED ORAL at 09:13

## 2024-03-12 RX ADMIN — FAMOTIDINE 20 MG: 20 TABLET, FILM COATED ORAL at 20:17

## 2024-03-12 RX ADMIN — OXYCODONE HYDROCHLORIDE 10 MG: 5 TABLET ORAL at 00:53

## 2024-03-12 RX ADMIN — GABAPENTIN 300 MG: 300 CAPSULE ORAL at 20:17

## 2024-03-12 RX ADMIN — OXYCODONE HYDROCHLORIDE 10 MG: 5 TABLET ORAL at 20:17

## 2024-03-12 RX ADMIN — CYCLOBENZAPRINE 10 MG: 10 TABLET, FILM COATED ORAL at 22:20

## 2024-03-12 RX ADMIN — DOCUSATE SODIUM 50 MG AND SENNOSIDES 8.6 MG 1 TABLET: 8.6; 5 TABLET, FILM COATED ORAL at 09:13

## 2024-03-12 RX ADMIN — DIPHENHYDRAMINE HYDROCHLORIDE 25 MG: 50 INJECTION, SOLUTION INTRAMUSCULAR; INTRAVENOUS at 15:38

## 2024-03-12 RX ADMIN — CYCLOBENZAPRINE 10 MG: 10 TABLET, FILM COATED ORAL at 09:18

## 2024-03-12 RX ADMIN — GABAPENTIN 300 MG: 300 CAPSULE ORAL at 12:43

## 2024-03-12 ASSESSMENT — PAIN DESCRIPTION - ORIENTATION
ORIENTATION: MID
ORIENTATION: MID;LOWER

## 2024-03-12 ASSESSMENT — PAIN SCALES - GENERAL
PAINLEVEL_OUTOF10: 0
PAINLEVEL_OUTOF10: 7
PAINLEVEL_OUTOF10: 7
PAINLEVEL_OUTOF10: 0
PAINLEVEL_OUTOF10: 0
PAINLEVEL_OUTOF10: 7
PAINLEVEL_OUTOF10: 2
PAINLEVEL_OUTOF10: 8
PAINLEVEL_OUTOF10: 7

## 2024-03-12 ASSESSMENT — PAIN DESCRIPTION - DESCRIPTORS
DESCRIPTORS: ACHING;ITCHING;SORE
DESCRIPTORS: ACHING;PRESSURE;SORE
DESCRIPTORS: ITCHING;TENDER
DESCRIPTORS: ACHING;PRESSURE;SORE
DESCRIPTORS: STABBING

## 2024-03-12 ASSESSMENT — PAIN DESCRIPTION - LOCATION
LOCATION: BACK

## 2024-03-12 ASSESSMENT — PAIN DESCRIPTION - PAIN TYPE
TYPE: SURGICAL PAIN
TYPE: ACUTE PAIN;SURGICAL PAIN
TYPE: SURGICAL PAIN

## 2024-03-12 ASSESSMENT — PAIN DESCRIPTION - FREQUENCY: FREQUENCY: INTERMITTENT

## 2024-03-12 ASSESSMENT — PAIN DESCRIPTION - ONSET: ONSET: GRADUAL

## 2024-03-12 NOTE — PROGRESS NOTES
ACUTE PHYSICAL THERAPY GOALS:   (Developed with and agreed upon by patient and/or caregiver.)  Pt will perform bed mobility with SBA while maintaining spinal precautions in 7 therapy sessions.  Pt will perform sit-to-stand/ stand-to-sit transfers SBA in 7 therapy sessions.  Pt will ambulate 250 ft SBA with use of LRAD/no device and breaks as needed in 7 therapy sessions.  Pt will tolerate 15 minutes of standing activity with LRAD/no device in 7 therapy sessions.  Pt will perform standing dynamic balance activities with minimal postural sway in 7 therapy sessions.  Pt will tolerate multiple sets and reps of BLE exercises in 7 therapy sessions.    PHYSICAL THERAPY: Daily Note PM   (Link to Caseload Tracking: PT Visit Days : 6  Time In/Out PT Charge Capture  Rehab Caseload Tracker  Orders    Spinal Precautions     Jesus Walters is a 53 y.o. male   PRIMARY DIAGNOSIS: Complications of internal orthopedic device, implant, and graft (HCC)  Complications of internal orthopedic device, implant, and graft (HCC) [T84.9XXA]  S/P lumbar fusion [Z98.1]  S/P fusion of thoracic spine [Z98.1]  Procedure(s) (LRB):  T10-L3 reinstrumentation. (N/A)  6 Days Post-Op  Inpatient: Payor: NATALIA / Plan: TRINO [facility] / Product Type: *No Product type* /     ASSESSMENT:     REHAB RECOMMENDATIONS:   Recommendation to date pending progress:  Setting:  STR    Equipment:    To Be Determined     ASSESSMENT:  Mr. Walters continues to present with pain in the lower back and decreased activity tolerance.  He does well with log roll technique in and out of bed, but demonstrates poor standing technique and use of the RW.  He needs frequent cueing for posture as he tends to lean forward onto the walker.  The patient reports he feels like he is falling backward and has lots of pain when standing up straight.  Maintained gait distances and was agreeable to sit at the edge of bed. Slow progress toward goals.      This PM the patient remains

## 2024-03-12 NOTE — CARE COORDINATION
Insurance authorization is still pending for STR at [facility].  Updated therapy notes from today are being entered and will be sent by facility to insurance company.  PPD is in process and no covid testing is required for this facility.  If patient is denied for admission to SNF by insurance company, plan will be for patient to discharge to patient's sister's home with [facility] home health.

## 2024-03-12 NOTE — PROGRESS NOTES
ACUTE OCCUPATIONAL THERAPY GOALS:   (Developed with and agreed upon by patient and/or caregiver.)  1. Patient will verbalize and demonstrate understanding of spinal precautions with 100% accuracy during ADLs.   2. Patient will complete lower body bathing and dressing with SBA and adaptive equipment as needed.   3. Patient will complete functional transfers with SUPERVISION and adaptive equipment as needed.   4. Patient will complete toileting and toilet transfer with SUPERVISION.   5. Patient will complete functional mobility of household distances with SUPERVISION and adaptive equipment as needed.   6. Patient will demonstrate ability to log roll in bed with SUPERVISION and MIN verbal cues from therapist. GOAL MET 3/8/24     Timeframe: 7 visits      OCCUPATIONAL THERAPY: Daily Note AM   OT Visit Days: 4   Time In/Out  OT Charge Capture  Rehab Caseload Tracker  OT Orders    Spinal Precautions     Jesus Walters is a 53 y.o. male   PRIMARY DIAGNOSIS: Complications of internal orthopedic device, implant, and graft (HCC)  Complications of internal orthopedic device, implant, and graft (HCC) [T84.9XXA]  S/P lumbar fusion [Z98.1]  S/P fusion of thoracic spine [Z98.1]  Procedure(s) (LRB):  T10-L3 reinstrumentation. (N/A)  6 Days Post-Op  Inpatient: Payor: NATALIA / Plan: [facility] / Product Type: *No Product type* /     ASSESSMENT:     REHAB RECOMMENDATIONS:   Recommendation to date pending progress:  Setting:  Short-term Rehab    Equipment:    To Be Determined     ASSESSMENT:  Mr. Walters is doing well today. Pt presents supine upon arrival. Pt is performing transfers/mobility in room with supervision/SBA. Pt was able to shower today. Required only supervision for bathing and dressing today. Making progress with goals. Will continue to benefit from skilled OT during stay.       SUBJECTIVE:     Mr. Walters states, \"I need caffeine\"     Social/Functional Lives With: Other (comment) ([employer])  Type of Home:   Assistance, Min=Minimal Assistance, Mod=Moderate Assistance, Max=Maximal Assistance, Total=Total Assistance, NT=Not Tested    BALANCE: Good Fair+ Fair Fair- Poor NT Comments   Sitting Static [x] [] [] [] [] []    Sitting Dynamic [x] [] [] [] [] []              Standing Static [] [x] [] [] [] []    Standing Dynamic [] [x] [] [] [] []        PLAN:     FREQUENCY/DURATION   OT Plan of Care: 3 times/week for duration of hospital stay or until stated goals are met, whichever comes first.    TREATMENT:     TREATMENT:   Therapeutic Activity (10 Minutes): Patient participated in therapeutic activities including bed mobility training, functional transfer training, shower transfer training, bathroom mobility, sitting tolerance activity, and standing tolerance activity with minimal verbal cues in order to increase independence, increase safety awareness, increase activity tolerance, increase coordination, and decrease assistance required.   Self Care (20 minutes): Patient participated in upper body bathing, lower body bathing, upper body dressing, lower body dressing, and grooming ADLs in supported sitting and standing with minimal verbal cueing to increase independence, decrease assistance required, increase activity tolerance, increase safety awareness, and maintain precautions. Patient also participated in functional mobility and functional transfer training to increase independence, decrease assistance required, increase activity tolerance, increase safety awareness, and maintain precautions.     TREATMENT GRID:  N/A    AFTER TREATMENT PRECAUTIONS: Bed, Bed/Chair Locked, Call light within reach, and Needs within reach    INTERDISCIPLINARY COLLABORATION:  RN/ PCT and OT/ MARTINEZ    EDUCATION:       TOTAL TREATMENT DURATION AND TIME:  Time In: 0945  Time Out: 1015  Minutes: 30    WERO Zepeda

## 2024-03-12 NOTE — PROGRESS NOTES
ACUTE PHYSICAL THERAPY GOALS:   (Developed with and agreed upon by patient and/or caregiver.)  Pt will perform bed mobility with SBA while maintaining spinal precautions in 7 therapy sessions.  Pt will perform sit-to-stand/ stand-to-sit transfers SBA in 7 therapy sessions.  Pt will ambulate 250 ft SBA with use of LRAD/no device and breaks as needed in 7 therapy sessions.  Pt will tolerate 15 minutes of standing activity with LRAD/no device in 7 therapy sessions.  Pt will perform standing dynamic balance activities with minimal postural sway in 7 therapy sessions.  Pt will tolerate multiple sets and reps of BLE exercises in 7 therapy sessions.    PHYSICAL THERAPY: Daily Note AM   (Link to Caseload Tracking: PT Visit Days : 6  Time In/Out PT Charge Capture  Rehab Caseload Tracker  Orders    Spinal Precautions     Jesus Walters is a 53 y.o. male   PRIMARY DIAGNOSIS: Complications of internal orthopedic device, implant, and graft (HCC)  Complications of internal orthopedic device, implant, and graft (HCC) [T84.9XXA]  S/P lumbar fusion [Z98.1]  Procedure(s) (LRB):  T10-L3 reinstrumentation. (N/A)  6 Days Post-Op  Outpatient in a bed: Payor: NATALIA / Plan: [facility] / Product Type: *No Product type* /     ASSESSMENT:     REHAB RECOMMENDATIONS:   Recommendation to date pending progress:  Setting:  STR    Equipment:    To Be Determined     ASSESSMENT:  Mr. Walters continues to present with pain in the lower back and decreased activity tolerance.  He does well with log roll technique in and out of bed, but demonstrates poor standing technique and use of the RW.  He needs frequent cueing for posture as he tends to lean forward onto the walker.  The patient reports he feels like he is falling backward and has lots of pain when standing up straight.  Maintained gait distances and was agreeable to sit at the edge of bed. Slow progress toward goals.       SUBJECTIVE:   Mr. Walters states \"I haven't really been

## 2024-03-12 NOTE — PROGRESS NOTES
ORTHO PROGRESS NOTE    2024    Admit Date: 3/6/2024  Post Op day: 6 Days Post-Op      Subjective:     Jesus Walters is a patient who is now 6 Days Post-Op  and has no complaints.       Objective:     PT/OT:    Progressing daily    Vital Signs:    Patient Vitals for the past 8 hrs:   BP Temp Temp src Pulse Resp SpO2   24 0721 115/69 98.6 °F (37 °C) Oral 64 16 100 %     Temp (24hrs), Av.6 °F (37 °C), Min:98.6 °F (37 °C), Max:98.6 °F (37 °C)      LAB:    No results for input(s): \"HGB\", \"WBC\", \"PLT\" in the last 72 hours.    Physical Exam:    Awake and in no acute distress.  Mood and affect appropriate.  Respirations unlabored and no evidence cyanosis.  Calves nontender.  Abdomen soft and nontender.  Dressing clean/dry  No new neurologic deficit.    Assessment:      6 Days Post-Op STATUS POST Procedure(s):  T10-L3 reinstrumentation.      Plan:       Anticipate discharge to:  D      Signed By: NIKO FUENTES MD

## 2024-03-13 VITALS
SYSTOLIC BLOOD PRESSURE: 105 MMHG | DIASTOLIC BLOOD PRESSURE: 70 MMHG | WEIGHT: 175 LBS | HEART RATE: 79 BPM | HEIGHT: 70 IN | TEMPERATURE: 98.1 F | OXYGEN SATURATION: 100 % | BODY MASS INDEX: 25.05 KG/M2 | RESPIRATION RATE: 18 BRPM

## 2024-03-13 PROCEDURE — 6370000000 HC RX 637 (ALT 250 FOR IP): Performed by: ORTHOPAEDIC SURGERY

## 2024-03-13 PROCEDURE — 2580000003 HC RX 258: Performed by: ORTHOPAEDIC SURGERY

## 2024-03-13 PROCEDURE — 97164 PT RE-EVAL EST PLAN CARE: CPT

## 2024-03-13 PROCEDURE — 97530 THERAPEUTIC ACTIVITIES: CPT

## 2024-03-13 RX ORDER — OXYCODONE HYDROCHLORIDE 10 MG/1
10 TABLET ORAL EVERY 6 HOURS PRN
Qty: 28 TABLET | Refills: 0 | Status: SHIPPED | OUTPATIENT
Start: 2024-03-13 | End: 2024-03-20

## 2024-03-13 RX ADMIN — POLYETHYLENE GLYCOL 3350 17 G: 17 POWDER, FOR SOLUTION ORAL at 09:28

## 2024-03-13 RX ADMIN — ACETAMINOPHEN 650 MG: 325 TABLET ORAL at 10:56

## 2024-03-13 RX ADMIN — ACETAMINOPHEN 650 MG: 325 TABLET ORAL at 05:08

## 2024-03-13 RX ADMIN — BISACODYL 5 MG: 5 TABLET, COATED ORAL at 09:27

## 2024-03-13 RX ADMIN — DIPHENHYDRAMINE HYDROCHLORIDE 25 MG: 25 CAPSULE ORAL at 09:28

## 2024-03-13 RX ADMIN — DOCUSATE SODIUM 50 MG AND SENNOSIDES 8.6 MG 1 TABLET: 8.6; 5 TABLET, FILM COATED ORAL at 09:27

## 2024-03-13 RX ADMIN — LISINOPRIL 40 MG: 20 TABLET ORAL at 09:27

## 2024-03-13 RX ADMIN — OXYCODONE HYDROCHLORIDE 10 MG: 5 TABLET ORAL at 09:27

## 2024-03-13 RX ADMIN — FAMOTIDINE 20 MG: 20 TABLET, FILM COATED ORAL at 09:27

## 2024-03-13 RX ADMIN — GABAPENTIN 300 MG: 300 CAPSULE ORAL at 13:54

## 2024-03-13 RX ADMIN — OXYCODONE HYDROCHLORIDE 10 MG: 5 TABLET ORAL at 13:45

## 2024-03-13 RX ADMIN — SODIUM CHLORIDE, PRESERVATIVE FREE 10 ML: 5 INJECTION INTRAVENOUS at 09:31

## 2024-03-13 RX ADMIN — GABAPENTIN 300 MG: 300 CAPSULE ORAL at 09:27

## 2024-03-13 RX ADMIN — CYCLOBENZAPRINE 10 MG: 10 TABLET, FILM COATED ORAL at 09:28

## 2024-03-13 ASSESSMENT — PAIN SCALES - GENERAL
PAINLEVEL_OUTOF10: 5
PAINLEVEL_OUTOF10: 7
PAINLEVEL_OUTOF10: 8
PAINLEVEL_OUTOF10: 5

## 2024-03-13 ASSESSMENT — PAIN DESCRIPTION - PAIN TYPE
TYPE: SURGICAL PAIN
TYPE: SURGICAL PAIN

## 2024-03-13 ASSESSMENT — PAIN DESCRIPTION - ORIENTATION
ORIENTATION: MID
ORIENTATION: MID

## 2024-03-13 ASSESSMENT — PAIN DESCRIPTION - FREQUENCY
FREQUENCY: INTERMITTENT
FREQUENCY: INTERMITTENT

## 2024-03-13 ASSESSMENT — PAIN DESCRIPTION - DESCRIPTORS
DESCRIPTORS: ACHING;ITCHING;SORE
DESCRIPTORS: ACHING;ITCHING;SORE

## 2024-03-13 ASSESSMENT — PAIN DESCRIPTION - ONSET
ONSET: GRADUAL
ONSET: GRADUAL

## 2024-03-13 ASSESSMENT — PAIN DESCRIPTION - LOCATION
LOCATION: BACK
LOCATION: BACK

## 2024-03-13 NOTE — DISCHARGE SUMMARY
Discharge Summary    Patient ID:Jesus Walters  [id number]  1970  53 y.o.    Admit date: 3/6/2024    Discharge date:3/13/2024    Admitting Physician: Luis Alberto Porras, *    DATE OF SURGERY: 3/6/2024    SURGEON: Luis Alberto Porras MD     ASSISTANT: Hugh Morris MD.   A skilled assistant was deemed necessary for the case due to the intermittent proximity of the spinal cord and nerve roots.  It was there for the entire duration of the surgery.     PREOPERATIVE DIAGNOSIS:      1. Prior thoracolumbar fusion with instrumentation    POSTOPERATIVE DIAGNOSIS:      1. Prior thoracolumbar fusion with instrumentation    PROCEDURE:     1. Reinstrumentation T10-L3      ANESTHESIA: General.    ESTIMATED BLOOD LOSS: 100 ml    POSTOPERATIVE CONDITION: Stable.    INTRAOPERATIVE COMPLICATIONS: None.     IMPLANTS:   Implant Name Type Inv. Item Serial No.  Lot No. LRB No. Used Action   SCREW SPNL L45MM DIA6.5MM POLYAX DEWITT - [id number]   SCREW SPNL L45MM DIA6.5MM POLYAX DEWITT   KARYN SPINE [employer]WD [id number] N/A 1 Explanted   BLOCKER SPNL L50MM DIA6MM TI 1 LEV WOODY 3 - [id number]   BLOCKER SPNL L50MM DIA6MM TI 1 LEV WOODY 3   KARYN SPINE HOWM-WD 3 6 [id number] [id number] N/A 6 Implanted   BLOCKER SPNL CT WOODY 45 - [id number]   BLOCKER SPNL CT WOODY 45   KARYN SPINE HOWM-WD [id number] N/A 6 Explanted   SCREW SPNL L45MM DIA5.5MM POLYAX DEWITT - [id number] Screw/Plate/Nail/Cedric SCREW SPNL L45MM DIA5.5MM POLYAX DEWITT   KARYN SPINE [employer]-WD 3 6 [id number] N/A 1 Implanted       INDICATIONS FOR PROCEDURE:  Left L3 radiculopathy due to abberant hardware s/p T10-L3 instrumentation. In the outpatient setting, the risks, benefits, and potential complications of the above listed procedure were discussed and an informed consent was obtained.    Post Op complications: none    Hospital Course: Patient admitted to ortho floor. Antibiotics were given postop. SCD and mica hose were in place for DVT prophylaxis. Patient

## 2024-03-13 NOTE — PLAN OF CARE
Problem: Pain  Goal: Verbalizes/displays adequate comfort level or baseline comfort level  Outcome: Progressing  Flowsheets (Taken 3/12/2024 2017)  Verbalizes/displays adequate comfort level or baseline comfort level:   Encourage patient to monitor pain and request assistance   Assess pain using appropriate pain scale   Administer analgesics based on type and severity of pain and evaluate response   Implement non-pharmacological measures as appropriate and evaluate response     Problem: Discharge Planning  Goal: Discharge to home or other facility with appropriate resources  Outcome: Progressing  Flowsheets (Taken 3/12/2024 1947)  Discharge to home or other facility with appropriate resources:   Identify barriers to discharge with patient and caregiver   Arrange for needed discharge resources and transportation as appropriate   Identify discharge learning needs (meds, wound care, etc)     Problem: Safety - Adult  Goal: Free from fall injury  Outcome: Progressing  Flowsheets (Taken 3/12/2024 1947)  Free From Fall Injury: Instruct family/caregiver on patient safety     Problem: Skin/Tissue Integrity  Goal: Absence of new skin breakdown  Description: 1.  Monitor for areas of redness and/or skin breakdown  2.  Assess vascular access sites hourly  3.  Every 4-6 hours minimum:  Change oxygen saturation probe site  4.  Every 4-6 hours:  If on nasal continuous positive airway pressure, respiratory therapy assess nares and determine need for appliance change or resting period.  Outcome: Progressing     Problem: Neurosensory - Adult  Goal: Achieves maximal functionality and self care  Outcome: Progressing  Flowsheets (Taken 3/12/2024 1947)  Achieves maximal functionality and self care:   Monitor swallowing and airway patency with patient fatigue and changes in neurological status   Encourage and assist patient to increase activity and self care with guidance from physical therapy/occupational therapy     Problem:  3/12/2024 1947)  Absence of infection at discharge: Assess and monitor for signs and symptoms of infection     Problem: Metabolic/Fluid and Electrolytes - Adult  Goal: Electrolytes maintained within normal limits  Outcome: Progressing  Flowsheets (Taken 3/12/2024 1947)  Electrolytes maintained within normal limits:   Monitor labs and assess patient for signs and symptoms of electrolyte imbalances   Administer electrolyte replacement as ordered   Monitor response to electrolyte replacements, including repeat lab results as appropriate  Goal: Glucose maintained within prescribed range  Outcome: Progressing  Flowsheets (Taken 3/12/2024 1947)  Glucose maintained within prescribed range:   Monitor blood glucose as ordered   Assess for signs and symptoms of hyperglycemia and hypoglycemia   Administer ordered medications to maintain glucose within target range     Problem: Hematologic - Adult  Goal: Maintains hematologic stability  Outcome: Progressing  Flowsheets (Taken 3/12/2024 1947)  Maintains hematologic stability:   Assess for signs and symptoms of bleeding or hemorrhage   Monitor labs for bleeding or clotting disorders

## 2024-03-13 NOTE — PROGRESS NOTES
ACUTE PHYSICAL THERAPY GOALS:   (Developed with and agreed upon by patient and/or caregiver.)  Pt will perform bed mobility with SBA while maintaining spinal precautions in 7 therapy sessions. GOAL MET 3/13/24  Pt will perform sit-to-stand/ stand-to-sit transfers SBA in 7 therapy sessions. GOAL MET 3/13/24  Pt will ambulate 250 ft SBA with use of LRAD/no device and breaks as needed in 7 therapy sessions. PARTIALLY MET 3/13/24  Pt will tolerate 15 minutes of standing activity with LRAD/no device in 7 therapy sessions.  Pt will perform standing dynamic balance activities with minimal postural sway in 7 therapy sessions.  Pt will tolerate multiple sets and reps of BLE exercises in 7 therapy sessions.     New Goals:    (1.) Jesus Walters  will move from supine to sit and sit to supine  with INDEPENDENCE within 7 treatment day(s).    (2.) Jesus Walters will transfer from bed to chair and chair to bed with MODIFIED INDEPENDENCE using the least restrictive device within 7 treatment day(s).    (3.) Jesus Walters will ambulate with MODIFIED INDEPENDENCE for 300 feet with the least restrictive device within 7 treatment day(s)..       PHYSICAL THERAPY Daily Note, Re-evaluation, and AM  (Link to Caseload Tracking: PT Visit Days : 1  Acknowledge Orders  Time In/Out  PT Charge Capture  Rehab Caseload Tracker    Spinal Precautions    Jesus Walters is a 53 y.o. male   PRIMARY DIAGNOSIS: Complications of internal orthopedic device, implant, and graft (HCC)  Complications of internal orthopedic device, implant, and graft (HCC) [T84.9XXA]  S/P lumbar fusion [Z98.1]  S/P fusion of thoracic spine [Z98.1]  Procedure(s) (LRB):  T10-L3 reinstrumentation. (N/A)  7 Days Post-Op  Reason for Referral: Other abnormalities of gait and mobility (R26.89)  Inpatient: Payor: BCBS / Plan: BCBS OUT OF STATE / Product Type: *No Product type* /     ASSESSMENT:     REHAB RECOMMENDATIONS:   Recommendation to date pending  Assistance: Independent (struggled)    OBJECTIVE:     PAIN: VITALS / O2: PRECAUTION / LINES / DRAINS:   Pre Treatment: complains of high pain levels with mobility, does not rate         Post Treatment: same Vitals        Oxygen      None    RESTRICTIONS/PRECAUTIONS:  Restrictions/Precautions: Fall Risk, Bed Alarm        Spinal Precautions: No Bending, No Lifting, No Twisting        GROSS EVALUATION:  Intact Impaired (Comments):   AROM []  Trunk ROM limited by spinal precautions    PROM [x]    Strength []  Generalized weakness   Balance [] Posture: Fair  Sitting - Static: Good  Sitting - Dynamic: Fair, +  Standing - Static: Fair  Standing - Dynamic: Fair   Posture [] N/A   Sensation []     Coordination []      Tone []  NT formally   Edema []    Activity Tolerance [] Patient limited by fatigue, Patient limited by pain    []      COGNITION/  PERCEPTION: Intact Impaired (Comments):   Orientation [x]     Vision []  Appears functionally intact    Hearing []  Appears functionally intact    Cognition  [x]       MOBILITY: I Mod I S SBA CGA Min Mod Max Total  NT x2 Comments:   Bed Mobility    Rolling [] [] [] [x] [] [] [] [] [] [] []    Supine to Sit [] [] [] [x] [] [] [] [] [] [] []    Scooting [] [] [] [x] [] [] [] [] [] [] []    Sit to Supine [] [] [] [] [] [] [] [] [] [x] []    Transfers    Sit to Stand [] [] [] [x] [] [] [] [] [] [] []    Bed to Chair [] [] [] [x] [] [] [] [] [] [] []    Stand to Sit [] [] [] [x] [] [] [] [] [] [] []     [] [] [] [] [] [] [] [] [] [] []    I=Independent, Mod I=Modified Independent, S=Supervision, SBA=Standby Assistance, CGA=Contact Guard Assistance,   Min=Minimal Assistance, Mod=Moderate Assistance, Max=Maximal Assistance, Total=Total Assistance, NT=Not Tested    GAIT: I Mod I S SBA CGA Min Mod Max Total  NT x2 Comments:   Level of Assistance [] [] [] [x] [x] [] [] [] [] [] []    Distance '75 feet x 2    DME Rolling Walker    Gait Quality Decreased rikki , Decreased step clearance,

## 2024-03-13 NOTE — PROGRESS NOTES
ORTHO PROGRESS NOTE    2024    Admit Date: 3/6/2024  Post Op day: 7 Days Post-Op      Subjective:     Jesus Walters is a patient who is now 7 Days Post-Op  and has no complaints.       Objective:     PT/OT:    Progressing    Vital Signs:    Patient Vitals for the past 8 hrs:   BP Temp Temp src Pulse Resp SpO2   24 0844 105/70 98.1 °F (36.7 °C) Oral 79 18 100 %     Temp (24hrs), Av.5 °F (36.9 °C), Min:98.1 °F (36.7 °C), Max:98.8 °F (37.1 °C)      LAB:    No results for input(s): \"HGB\", \"WBC\", \"PLT\" in the last 72 hours.    Physical Exam:    Awake and in no acute distress.  Mood and affect appropriate.  Respirations unlabored and no evidence cyanosis.  Calves nontender.  Abdomen soft and nontender.  Dressing clean/dry  No new neurologic deficit. Left hip strength improving. Gait improving.    Assessment:      7 Days Post-Op STATUS POST Procedure(s):  T10-L3 reinstrumentation.      Plan:     Stable for discharge to SNF today if approved.        Signed By: NIKO FUENTES MD

## 2024-03-13 NOTE — PLAN OF CARE
Problem: Pain  Goal: Verbalizes/displays adequate comfort level or baseline comfort level  Outcome: Progressing     Problem: Discharge Planning  Goal: Discharge to home or other facility with appropriate resources  Outcome: Progressing  Flowsheets (Taken 3/13/2024 0844)  Discharge to home or other facility with appropriate resources: Identify barriers to discharge with patient and caregiver     Problem: Safety - Adult  Goal: Free from fall injury  Outcome: Progressing  Flowsheets (Taken 3/13/2024 0741)  Free From Fall Injury: Instruct family/caregiver on patient safety     Problem: Skin/Tissue Integrity  Goal: Absence of new skin breakdown  Description: 1.  Monitor for areas of redness and/or skin breakdown  2.  Assess vascular access sites hourly  3.  Every 4-6 hours minimum:  Change oxygen saturation probe site  4.  Every 4-6 hours:  If on nasal continuous positive airway pressure, respiratory therapy assess nares and determine need for appliance change or resting period.  Outcome: Progressing     Problem: Neurosensory - Adult  Goal: Achieves maximal functionality and self care  Outcome: Progressing  Flowsheets (Taken 3/13/2024 0844)  Achieves maximal functionality and self care: Monitor swallowing and airway patency with patient fatigue and changes in neurological status     Problem: Respiratory - Adult  Goal: Achieves optimal ventilation and oxygenation  Outcome: Progressing  Flowsheets (Taken 3/13/2024 0844)  Achieves optimal ventilation and oxygenation: Assess for changes in respiratory status     Problem: Cardiovascular - Adult  Goal: Maintains optimal cardiac output and hemodynamic stability  Outcome: Progressing  Flowsheets (Taken 3/13/2024 0844)  Maintains optimal cardiac output and hemodynamic stability: Monitor blood pressure and heart rate     Problem: Skin/Tissue Integrity - Adult  Goal: Incisions, wounds, or drain sites healing without S/S of infection  Outcome: Progressing  Flowsheets (Taken  3/13/2024 0844)  Incisions, Wounds, or Drain Sites Healing Without Sign and Symptoms of Infection: ADMISSION and DAILY: Assess and document risk factors for pressure ulcer development     Problem: Musculoskeletal - Adult  Goal: Return mobility to safest level of function  Outcome: Progressing     Problem: Gastrointestinal - Adult  Goal: Maintains or returns to baseline bowel function  Outcome: Progressing  Flowsheets (Taken 3/13/2024 0844)  Maintains or returns to baseline bowel function: Assess bowel function     Problem: Genitourinary - Adult  Goal: Absence of urinary retention  Outcome: Progressing  Flowsheets (Taken 3/13/2024 0844)  Absence of urinary retention: Assess patient’s ability to void and empty bladder     Problem: Infection - Adult  Goal: Absence of infection at discharge  Outcome: Progressing  Flowsheets (Taken 3/13/2024 0844)  Absence of infection at discharge: Assess and monitor for signs and symptoms of infection     Problem: Metabolic/Fluid and Electrolytes - Adult  Goal: Electrolytes maintained within normal limits  Outcome: Progressing  Flowsheets (Taken 3/13/2024 0844)  Electrolytes maintained within normal limits: Monitor labs and assess patient for signs and symptoms of electrolyte imbalances  Goal: Glucose maintained within prescribed range  Outcome: Progressing  Flowsheets (Taken 3/13/2024 0844)  Glucose maintained within prescribed range: Monitor blood glucose as ordered     Problem: Hematologic - Adult  Goal: Maintains hematologic stability  Outcome: Progressing  Flowsheets (Taken 3/13/2024 0844)  Maintains hematologic stability: Assess for signs and symptoms of bleeding or hemorrhage

## 2024-03-22 ENCOUNTER — OFFICE VISIT (OUTPATIENT)
Dept: ORTHOPEDIC SURGERY | Age: 54
End: 2024-03-22

## 2024-03-22 DIAGNOSIS — Z98.1 STATUS POST LUMBAR SPINAL ARTHRODESIS: Primary | ICD-10-CM

## 2024-03-22 PROCEDURE — 99024 POSTOP FOLLOW-UP VISIT: CPT | Performed by: ORTHOPAEDIC SURGERY

## 2024-03-22 NOTE — PROGRESS NOTES
Name: Jesus Walters  YOB: 1970  Gender: male  MRN: [id number]  Age: 54 y.o.    Chief Complaint: Lumbar spine surgery follow up    History of Present Illness:      Jesus Walters  is here for 2 week follow up of his   lumbar reinstrumentation  surgery.  He reports significant relief of preoperative lower extremity pain, weakness and parasthesias. There is the expected residual back stiffness.           Medications:       Current Outpatient Medications:     gabapentin (NEURONTIN) 300 MG capsule, Take 1 capsule by mouth 3 times daily for 30 days. Intended supply: 30 days, Disp: 90 capsule, Rfl: 0    gabapentin (NEURONTIN) 300 MG capsule, Take 1 capsule by mouth 3 times daily as needed (back/leg/nerve pain)., Disp: , Rfl:     cyclobenzaprine (FLEXERIL) 5 MG tablet, Take 1 tablet by mouth 3 times daily as needed for Muscle spasms, Disp: 30 tablet, Rfl: 0    lisinopril (PRINIVIL;ZESTRIL) 40 MG tablet, Take 1 tablet by mouth daily, Disp: , Rfl:     Allergies:  Allergies   Allergen Reactions    Latex Hives         Physical Exam:      Respirations are unlabored and there is no evidence of cyanosis      Wound is healed nicely without erythema, drainage or underlying fluctuance    Gait is improved    L2 sensation is improved.       Radiographic Studies:     X-rays including AP and lateral views of the lumbar spine were reviewed and interpreted:     Postoperative changes are noted status post lumbar fusion with instrumentation.  The hardware appears to be well-placed and intact.  There is no evidence of significant osteolysis.  No significant adjacent level decompensation.  Alignment is maintained.  The burst fracture appears to be healing and becoming sclerotic.    Radiographic Impression:    Appropriate postoperative changes status post lumbar fusion without evidence for hardware failure or decompensation.  Fracture is healing appropriately.    Diagnosis:      ICD-10-CM    1. Status post

## 2024-03-28 DIAGNOSIS — Z98.1 STATUS POST LUMBAR SPINAL ARTHRODESIS: ICD-10-CM

## 2024-03-28 PROBLEM — R52 PAIN: Status: RESOLVED | Noted: 2024-02-06 | Resolved: 2024-03-28

## 2024-03-28 RX ORDER — GABAPENTIN 300 MG/1
300 CAPSULE ORAL 3 TIMES DAILY
Qty: 90 CAPSULE | Refills: 0 | OUTPATIENT
Start: 2024-03-28

## 2024-05-02 ENCOUNTER — TELEPHONE (OUTPATIENT)
Dept: ORTHOPEDIC SURGERY | Age: 54
End: 2024-05-02

## 2024-05-02 NOTE — TELEPHONE ENCOUNTER
Patient states that he is nervously worried because he can hardly speak, he still has pain and can't move his arms and doesn't know what to do.  He asks for a return call please.

## 2024-05-02 NOTE — TELEPHONE ENCOUNTER
Discussed with Dr Desir and he does not think what is going on with his symptoms is related to his surgery done 2 months ago. Instructed to go to the ER if he was unable to use his arms

## 2024-05-07 DIAGNOSIS — R97.20 ELEVATED PSA: Primary | ICD-10-CM

## 2024-05-09 ENCOUNTER — TELEPHONE (OUTPATIENT)
Dept: ONCOLOGY | Age: 54
End: 2024-05-09

## 2024-05-10 ENCOUNTER — TELEPHONE (OUTPATIENT)
Dept: ORTHOPEDIC SURGERY | Age: 54
End: 2024-05-10

## 2024-05-10 NOTE — TELEPHONE ENCOUNTER
Patient would like for you to give him a call to talk about his symptoms. Patient wants to know if he should go to the ER again.

## 2024-05-10 NOTE — TELEPHONE ENCOUNTER
Patient called back to f/u with [facility]. He went to the Er and they said everything looks good but he still in pain. Patient said the middle of his back is hurting really bad he said he is nausea. Please call the patient.

## 2024-05-10 NOTE — TELEPHONE ENCOUNTER
Patient is calling and states that he is very worried right now because he read online that using nicotine is catastrophic after the surgery he had in January.  He has been using nicotine patches and nicotine gum since January and states no one told him that he wasn't supposed to use nicotine after the surgery.  He states that he has no appetite and has lost 50 lbs since January.  He states that he has chills and GI symptoms as well.  He went to [facility] 2 days ago and they only told him to check back with Dr. Porras.  He states that he can hardly walk and didn't have transportation to get to his appointment this morning.  He states that he just wants a return call to know what he needs to do because he is past anxious.  He states that he has no transportation today and I did inform him that if he feels that he is getting worse that he should call 911, as he kept stating that \"this is bad\" and \"something is wrong\".  He called right back a 2nd time and wanted to make sure that I had all the information to send to Dr. Porras because he feels that something is wrong and that he really wants to talk to clinical or MD asap please.  Thank you.

## 2024-05-13 ENCOUNTER — OFFICE VISIT (OUTPATIENT)
Age: 54
End: 2024-05-13
Payer: COMMERCIAL

## 2024-05-13 DIAGNOSIS — M43.25 FUSION OF SPINE OF THORACOLUMBAR REGION: Primary | ICD-10-CM

## 2024-05-13 PROCEDURE — 99214 OFFICE O/P EST MOD 30 MIN: CPT | Performed by: ORTHOPAEDIC SURGERY

## 2024-05-13 PROCEDURE — L0642 LO SAG RI AN/POS PNL PRE OTS: HCPCS | Performed by: ORTHOPAEDIC SURGERY

## 2024-05-13 RX ORDER — OYSTER SHELL CALCIUM WITH VITAMIN D 500; 200 MG/1; [IU]/1
1 TABLET, FILM COATED ORAL DAILY
Qty: 30 TABLET | Refills: 1 | Status: SHIPPED | OUTPATIENT
Start: 2024-05-13

## 2024-05-13 RX ORDER — CYCLOBENZAPRINE HCL 10 MG
10 TABLET ORAL 3 TIMES DAILY PRN
Qty: 40 TABLET | Refills: 0 | Status: SHIPPED | OUTPATIENT
Start: 2024-05-13 | End: 2024-05-26

## 2024-05-13 RX ORDER — OXYCODONE HYDROCHLORIDE 5 MG/1
5 TABLET ORAL EVERY 6 HOURS PRN
Qty: 28 TABLET | Refills: 0 | Status: SHIPPED | OUTPATIENT
Start: 2024-05-13 | End: 2024-05-20

## 2024-05-13 NOTE — PROGRESS NOTES
Name: Jesus Walters  YOB: 1970  Gender: male  MRN: [id number]  Age: 54 y.o.       History of present illness:     This is the 54-year-old gentleman who is status post thoracolumbar fusion surgery for an L1 burst fracture.  He was initially seen and at [facility] and placed in a TLSO but then ended up getting admitted at [facility] due to intractable pain and inability to ambulate or care for himself.  We ultimately proceeded with a T10-L3 fusion which was complicated by a left sided radiculopathy and required repositioning of the one of the left sided screws.  The patient has done better in terms of that radiculopathy.  However, he has developed intractable back pain.  He did go to [facility] emergency department and was admitted.  He had multiple scans including cervical, thoracic, and lumbar MRI and thoracic and lumbar CT scans.  He was seen by the neurosurgery department and referred back here.  Patient lives in [place] and has trouble getting to his appointments in [place].  He has not been compliant with the TLSO because of discomfort while wearing it.  He reports a significant weight loss since this all started nearly 6 months ago.  He has not been able to return to work.  He has successfully stopped using nicotine for about a month now.  He has been trying to eat better.  His sister is a dietitian and is planning to work with him on high value food groups.  His pain has remained intractable.  He is really interested in having his hardware completely removed as he thinks he may be allergic or reacting to it.    Physical Exam:    He is awake and oriented.  He is in mild distress from pain.  He has obviously lost a significant amount of weight in the interim since last seeing him.  His wound is well-healed without drainage or fluctuance.  His gait is actually improved.  His L2 sensation and strength is improved.    Radiographic Studies:     I reviewed all of the studies from [facility] as

## 2024-05-14 NOTE — PROGRESS NOTES
The patient was fitted and instructed on use of a  EXOS Form Back Brace (lumbar). It has been prescribed to reduce pain by restricting mobility of the trunk and/or to otherwise support weak spinal muscles.  This brace was prescribed by Dr. FUENTES.  I instructed and demonstrated to the patient how to apply and use the brace. I also explained how the brace would facilitate lower lumbar support throughout the day. Pt. was also instructed on wear schedule to be no more than 4 hours at a time.  Patient read and signed documenting they understand and agree to [facility]'s current DME return policy.

## 2024-06-25 ENCOUNTER — TELEPHONE (OUTPATIENT)
Dept: ORTHOPEDIC SURGERY | Age: 54
End: 2024-06-25

## 2024-06-25 NOTE — TELEPHONE ENCOUNTER
Patient called to schedule an appointment.  The next available is 7/12/24.  Mr. Walters asks if he can be seen sooner please?

## 2024-07-01 ENCOUNTER — PATIENT MESSAGE (OUTPATIENT)
Age: 54
End: 2024-07-01

## 2024-07-01 DIAGNOSIS — M43.25 FUSION OF SPINE OF THORACOLUMBAR REGION: Primary | ICD-10-CM

## 2024-07-01 NOTE — TELEPHONE ENCOUNTER
Placed order for [facility] in chart and faxed records for pain management. Made patient aware through his mychart

## 2024-07-11 ENCOUNTER — APPOINTMENT (OUTPATIENT)
Dept: MRI IMAGING | Age: 54
End: 2024-07-11
Payer: COMMERCIAL

## 2024-07-11 ENCOUNTER — HOSPITAL ENCOUNTER (EMERGENCY)
Age: 54
Discharge: HOME | End: 2024-07-11
Attending: EMERGENCY MEDICINE
Payer: COMMERCIAL

## 2024-07-11 VITALS
SYSTOLIC BLOOD PRESSURE: 147 MMHG | OXYGEN SATURATION: 95 % | HEART RATE: 102 BPM | TEMPERATURE: 99.1 F | DIASTOLIC BLOOD PRESSURE: 97 MMHG | RESPIRATION RATE: 16 BRPM

## 2024-07-11 DIAGNOSIS — M54.50 LOW BACK PAIN, UNSPECIFIED BACK PAIN LATERALITY, UNSPECIFIED CHRONICITY, UNSPECIFIED WHETHER SCIATICA PRESENT: Primary | ICD-10-CM

## 2024-07-11 LAB
ANION GAP SERPL CALC-SCNC: 12 MMOL/L (ref 9–18)
BASOPHILS # BLD: 0 K/UL (ref 0–0.2)
BASOPHILS NFR BLD: 0 % (ref 0–2)
BILIRUB UR QL: NEGATIVE
BUN SERPL-MCNC: 6 MG/DL (ref 6–23)
CALCIUM SERPL-MCNC: 9.6 MG/DL (ref 8.8–10.2)
CHLORIDE SERPL-SCNC: 104 MMOL/L (ref 98–107)
CO2 SERPL-SCNC: 26 MMOL/L (ref 20–28)
CREAT SERPL-MCNC: 0.82 MG/DL (ref 0.8–1.3)
CRP SERPL HS-MCNC: 2.4 MG/L (ref 0–3)
DIFFERENTIAL METHOD BLD: ABNORMAL
EOSINOPHIL # BLD: 0.1 K/UL (ref 0–0.8)
EOSINOPHIL NFR BLD: 1 % (ref 0.5–7.8)
ERYTHROCYTE [DISTWIDTH] IN BLOOD BY AUTOMATED COUNT: 15 % (ref 11.9–14.6)
ERYTHROCYTE [SEDIMENTATION RATE] IN BLOOD: 13 MM/HR (ref 0–15)
GLUCOSE SERPL-MCNC: 99 MG/DL (ref 70–99)
GLUCOSE UR QL STRIP.AUTO: NEGATIVE MG/DL
HCT VFR BLD AUTO: 47.1 % (ref 41.1–50.3)
HGB BLD-MCNC: 15.5 G/DL (ref 13.6–17.2)
IMM GRANULOCYTES # BLD AUTO: 0.1 K/UL (ref 0–0.5)
IMM GRANULOCYTES NFR BLD AUTO: 0 % (ref 0–5)
KETONES UR-MCNC: NEGATIVE MG/DL
LEUKOCYTE ESTERASE UR QL STRIP: NEGATIVE
LYMPHOCYTES # BLD: 1.5 K/UL (ref 0.5–4.6)
LYMPHOCYTES NFR BLD: 11 % (ref 13–44)
MCH RBC QN AUTO: 27.8 PG (ref 26.1–32.9)
MCHC RBC AUTO-ENTMCNC: 32.9 G/DL (ref 31.4–35)
MCV RBC AUTO: 84.6 FL (ref 82–102)
MONOCYTES # BLD: 0.7 K/UL (ref 0.1–1.3)
MONOCYTES NFR BLD: 5 % (ref 4–12)
NEUTS SEG # BLD: 11.5 K/UL (ref 1.7–8.2)
NEUTS SEG NFR BLD: 83 % (ref 43–78)
NITRITE UR QL: NEGATIVE
NRBC # BLD: 0 K/UL (ref 0–0.2)
PH UR: 7.5 (ref 5–9)
PLATELET # BLD AUTO: 355 K/UL (ref 150–450)
PMV BLD AUTO: 9.4 FL (ref 9.4–12.3)
POTASSIUM SERPL-SCNC: 4.2 MMOL/L (ref 3.5–5.1)
PROT UR QL: NEGATIVE MG/DL
RBC # BLD AUTO: 5.57 M/UL (ref 4.23–5.6)
RBC # UR STRIP: NEGATIVE
SERVICE CMNT-IMP: NORMAL
SODIUM SERPL-SCNC: 142 MMOL/L (ref 136–145)
SP GR UR: 1.02 (ref 1–1.02)
UROBILINOGEN UR QL: 0.2 EU/DL (ref 0.2–1)
WBC # BLD AUTO: 13.9 K/UL (ref 4.3–11.1)

## 2024-07-11 PROCEDURE — 72148 MRI LUMBAR SPINE W/O DYE: CPT

## 2024-07-11 PROCEDURE — 99284 EMERGENCY DEPT VISIT MOD MDM: CPT

## 2024-07-11 PROCEDURE — 81003 URINALYSIS AUTO W/O SCOPE: CPT

## 2024-07-11 PROCEDURE — 72146 MRI CHEST SPINE W/O DYE: CPT

## 2024-07-11 PROCEDURE — 96361 HYDRATE IV INFUSION ADD-ON: CPT

## 2024-07-11 PROCEDURE — 6360000002 HC RX W HCPCS: Performed by: EMERGENCY MEDICINE

## 2024-07-11 PROCEDURE — 80048 BASIC METABOLIC PNL TOTAL CA: CPT

## 2024-07-11 PROCEDURE — 85025 COMPLETE CBC W/AUTO DIFF WBC: CPT

## 2024-07-11 PROCEDURE — 96375 TX/PRO/DX INJ NEW DRUG ADDON: CPT

## 2024-07-11 PROCEDURE — 86141 C-REACTIVE PROTEIN HS: CPT

## 2024-07-11 PROCEDURE — 2580000003 HC RX 258: Performed by: EMERGENCY MEDICINE

## 2024-07-11 PROCEDURE — 85652 RBC SED RATE AUTOMATED: CPT

## 2024-07-11 PROCEDURE — 96374 THER/PROPH/DIAG INJ IV PUSH: CPT

## 2024-07-11 RX ORDER — 0.9 % SODIUM CHLORIDE 0.9 %
1000 INTRAVENOUS SOLUTION INTRAVENOUS
Status: COMPLETED | OUTPATIENT
Start: 2024-07-11 | End: 2024-07-11

## 2024-07-11 RX ORDER — DEXAMETHASONE SODIUM PHOSPHATE 10 MG/ML
6 INJECTION INTRAMUSCULAR; INTRAVENOUS
Status: COMPLETED | OUTPATIENT
Start: 2024-07-11 | End: 2024-07-11

## 2024-07-11 RX ADMIN — SODIUM CHLORIDE 1 MG: 9 INJECTION INTRAMUSCULAR; INTRAVENOUS; SUBCUTANEOUS at 12:54

## 2024-07-11 RX ADMIN — SODIUM CHLORIDE 1000 ML: 9 INJECTION, SOLUTION INTRAVENOUS at 12:03

## 2024-07-11 RX ADMIN — DEXAMETHASONE SODIUM PHOSPHATE 6 MG: 10 INJECTION INTRAMUSCULAR; INTRAVENOUS at 12:04

## 2024-07-11 ASSESSMENT — ENCOUNTER SYMPTOMS
NAUSEA: 0
DIARRHEA: 0
CHEST TIGHTNESS: 0
COUGH: 0
SHORTNESS OF BREATH: 0
BACK PAIN: 1
ABDOMINAL PAIN: 1
CONSTIPATION: 1

## 2024-07-11 NOTE — ED PROVIDER NOTES
Emergency Department Provider Signout / Continuation of Care Note         DISPOSITION Decision To Discharge 07/11/2024 06:26:21 PM       ICD-10-CM    1. Low back pain, unspecified back pain laterality, unspecified chronicity, unspecified whether sciatica present  M54.50           The patient's care was signed out to me at shift change.  MRI was pending at the time of shift change.  Please see Dr. Tapia's note for complete history and physical.  In summary patient with acute on chronic back pain presented to ER.  Does have close outpatient follow-up scheduled tomorrow with his spine specialist.  MRI of the T and L-spine resulted with chronic findings, no significant changes when compared to MRI from 2 months ago.  Patient encouraged to alternate Tylenol and Motrin and reserve Norco for severe pain.  He will see his surgeon tomorrow morning as previously scheduled.  Return precautions given.  He voiced understanding and agreement.    Final Plan      Discharge home with close outpatient spine surgery follow-up      1 chronic illness with exacerbation.    I reviewed external records: provider visit note from outside specialist.                      Lul Schmidt,   07/11/24 1828    
surveillence Dr Teixeira    History of echocardiogram     Echo Stress 2021  LVEF 65%- mild-moderate MVP    History of gastroesophageal reflux (GERD)     well controlled w/o meds since 25 lb weightloss    History of Lyme disease 2006    Hyperlipidemia     Hypertension     Major depression     Male hypogonadism     Mitral valve prolapse     Echo Stress 2021 LVEF 65%- mild-moderate MVP    Mitral valve regurgitation     Echo Stress 2021 LVEF 65%- mild-moderate MVP    Obstructive sleep apnea     2/23/24 does not use CPAP, states has lost 25 lbs recently and can not tolerate due to back pain and sleeping position    Prostate enlargement         Past Surgical History:   Procedure Laterality Date    COLONOSCOPY      ESOPHAGEAL DILATATION      LUMBAR FUSION N/A 01/03/2024    L1 Laminectomy and T10-L3 fusion with allograft and instrumentation performed by Luis Alberto Hines MD at [facility] MAIN OR    LUMBAR FUSION N/A 3/6/2024    T10-L3 reinstrumentation. performed by Luis Alberto Hines MD at [facility] MAIN OR    LUMBAR LAMINECTOMY  2003    L3-4 laminectomy/discectomy    PROSTATE BIOPSY  2021        Social History     Socioeconomic History    Marital status: Single   Tobacco Use    Smoking status: Never    Smokeless tobacco: Former     Types: Chew, Snuff     Quit date: 2014   Vaping Use    Vaping Use: Never used   Substance and Sexual Activity    Alcohol use: Not Currently    Drug use: No    Sexual activity: Defer     Social Determinants of Health     Food Insecurity: Patient Declined (3/12/2024)    Hunger Vital Sign     Worried About Running Out of Food in the Last Year: Patient declined     Ran Out of Food in the Last Year: Patient declined   Transportation Needs: Patient Declined (3/12/2024)    PRAPARE - Transportation     Lack of Transportation (Medical): Patient declined     Lack of Transportation (Non-Medical): Patient declined   Housing Stability: Patient Declined (3/12/2024)    Housing Stability Vital Sign     Unable to

## 2024-07-11 NOTE — DISCHARGE INSTRUCTIONS
Alternate Tylenol and Motrin as needed for discomfort.  Use your home prescription of Norco as needed for severe, breakthrough pain.  Use caution this medication is a narcotic and can be sedating.  Follow-up with your spine specialist tomorrow as previously scheduled.  Return to the ER for worsening or worrisome symptoms.

## 2024-07-12 ENCOUNTER — OFFICE VISIT (OUTPATIENT)
Age: 54
End: 2024-07-12
Payer: COMMERCIAL

## 2024-07-12 DIAGNOSIS — M43.25 FUSION OF SPINE OF THORACOLUMBAR REGION: Primary | ICD-10-CM

## 2024-07-12 PROCEDURE — 99213 OFFICE O/P EST LOW 20 MIN: CPT | Performed by: ORTHOPAEDIC SURGERY

## 2024-07-12 RX ORDER — MELOXICAM 7.5 MG/1
7.5 TABLET ORAL DAILY
Qty: 30 TABLET | Refills: 3 | Status: SHIPPED | OUTPATIENT
Start: 2024-07-12

## 2024-07-12 RX ORDER — GABAPENTIN 300 MG/1
300 CAPSULE ORAL 3 TIMES DAILY
Qty: 90 CAPSULE | Refills: 3 | Status: SHIPPED | OUTPATIENT
Start: 2024-07-12 | End: 2024-11-09

## 2024-07-12 RX ORDER — CYCLOBENZAPRINE HCL 10 MG
10 TABLET ORAL 3 TIMES DAILY PRN
Qty: 30 TABLET | Refills: 3 | Status: SHIPPED | OUTPATIENT
Start: 2024-07-12 | End: 2024-08-21

## 2024-07-12 NOTE — PROGRESS NOTES
Allergies:  Allergies   Allergen Reactions    Latex Hives         Physical Exam:      Respirations are unlabored and there is no evidence of cyanosis    There is no focal motor deficit outside of some left hip flexion weakness.  He is able to stand during the duration of our interaction today.  He stands with an upright posture.       Radiographic Studies:     MRI lumbar spine and thoracic spine report and images were reviewed and independently interpreted.  He has a small thoracic disc herniation that is nonimpinging.  Otherwise, the thoracic spine is normal in structure and integrity.  The lumbar spine shows the burst fracture without significant edema but with significant residual buckling of the posterior wall resulting in about 50% compromise of the canal.  All the hardware appears to be intact and appropriately placed without evidence of pullout or loosening.    Diagnosis:      ICD-10-CM    1. Fusion of spine of thoracolumbar region  M43.25           Assessment/Plan:      Radiographically, the patient is following the expected course after sustaining a burst fracture with subsequent fusion and instrument stabilization.   I think he would benefit from an ongoing regimen of muscle relaxants, neuromodulators, NSAIDs, and Tylenol.  I did prescribe Flexeril, gabapentin, Mobic with a couple of refills.  Also, I think he would benefit from physical therapy to begin with some core strengthening.  He may also benefit from modalities including TENS unit, massage.  He should continue to limit lifting to 10 pounds.  The patient has requested referral to a formal pain management center which we will identify in his local vicinity and [place] and make a referral there.  Otherwise, we will see him back in 3 months with repeat radiographs of the thoracolumbar spine      Electronically Signed By Luis Alberto Desir MD   07/12/24  10:26 AM

## 2024-08-17 ENCOUNTER — HOSPITAL ENCOUNTER (INPATIENT)
Age: 54
LOS: 3 days | Discharge: PSYCHIATRIC OR SUBSTANCE USE FACILITY | DRG: 923 | End: 2024-08-20
Attending: EMERGENCY MEDICINE | Admitting: INTERNAL MEDICINE
Payer: COMMERCIAL

## 2024-08-17 DIAGNOSIS — M62.82 NON-TRAUMATIC RHABDOMYOLYSIS: Primary | ICD-10-CM

## 2024-08-17 DIAGNOSIS — N17.9 AKI (ACUTE KIDNEY INJURY) (HCC): ICD-10-CM

## 2024-08-17 LAB
ALBUMIN SERPL-MCNC: 4.4 G/DL (ref 3.5–5)
ALBUMIN/GLOB SERPL: 1.5 (ref 1–1.9)
ALP SERPL-CCNC: 64 U/L (ref 40–129)
ALT SERPL-CCNC: 24 U/L (ref 12–65)
AMPHET UR QL SCN: NEGATIVE
ANION GAP SERPL CALC-SCNC: 19 MMOL/L (ref 9–18)
APPEARANCE UR: CLEAR
AST SERPL-CCNC: 51 U/L (ref 15–37)
BACTERIA URNS QL MICRO: 0 /HPF
BARBITURATES UR QL SCN: NEGATIVE
BASOPHILS # BLD: 0.1 K/UL (ref 0–0.2)
BASOPHILS NFR BLD: 1 % (ref 0–2)
BENZODIAZ UR QL: NEGATIVE
BILIRUB SERPL-MCNC: 0.7 MG/DL (ref 0–1.2)
BILIRUB UR QL: NEGATIVE
BUN SERPL-MCNC: 27 MG/DL (ref 6–23)
CALCIUM SERPL-MCNC: 9.9 MG/DL (ref 8.8–10.2)
CANNABINOIDS UR QL SCN: NEGATIVE
CASTS URNS QL MICRO: ABNORMAL /LPF
CHLORIDE SERPL-SCNC: 99 MMOL/L (ref 98–107)
CK SERPL-CCNC: 943 U/L (ref 21–215)
CO2 SERPL-SCNC: 22 MMOL/L (ref 20–28)
COCAINE UR QL SCN: NEGATIVE
COLOR UR: ABNORMAL
CREAT SERPL-MCNC: 1.73 MG/DL (ref 0.8–1.3)
DIFFERENTIAL METHOD BLD: ABNORMAL
EOSINOPHIL # BLD: 0 K/UL (ref 0–0.8)
EOSINOPHIL NFR BLD: 0 % (ref 0.5–7.8)
EPI CELLS #/AREA URNS HPF: ABNORMAL /HPF
ERYTHROCYTE [DISTWIDTH] IN BLOOD BY AUTOMATED COUNT: 14.1 % (ref 11.9–14.6)
ETHANOL SERPL-MCNC: <11 MG/DL (ref 0–0.08)
GLOBULIN SER CALC-MCNC: 3 G/DL (ref 2.3–3.5)
GLUCOSE SERPL-MCNC: 150 MG/DL (ref 70–99)
GLUCOSE UR STRIP.AUTO-MCNC: NEGATIVE MG/DL
HCT VFR BLD AUTO: 48.9 % (ref 41.1–50.3)
HGB BLD-MCNC: 16.5 G/DL (ref 13.6–17.2)
HGB UR QL STRIP: ABNORMAL
IMM GRANULOCYTES # BLD AUTO: 0.1 K/UL (ref 0–0.5)
IMM GRANULOCYTES NFR BLD AUTO: 0 % (ref 0–5)
KETONES UR QL STRIP.AUTO: 15 MG/DL
LEUKOCYTE ESTERASE UR QL STRIP.AUTO: ABNORMAL
LYMPHOCYTES # BLD: 1.3 K/UL (ref 0.5–4.6)
LYMPHOCYTES NFR BLD: 8 % (ref 13–44)
MCH RBC QN AUTO: 28.4 PG (ref 26.1–32.9)
MCHC RBC AUTO-ENTMCNC: 33.7 G/DL (ref 31.4–35)
MCV RBC AUTO: 84.3 FL (ref 82–102)
METHADONE UR QL: NEGATIVE
MONOCYTES # BLD: 1.2 K/UL (ref 0.1–1.3)
MONOCYTES NFR BLD: 7 % (ref 4–12)
NEUTS SEG # BLD: 14.7 K/UL (ref 1.7–8.2)
NEUTS SEG NFR BLD: 85 % (ref 43–78)
NITRITE UR QL STRIP.AUTO: NEGATIVE
NRBC # BLD: 0 K/UL (ref 0–0.2)
OPIATES UR QL: NEGATIVE
PCP UR QL: NEGATIVE
PH UR STRIP: 5 (ref 5–9)
PLATELET # BLD AUTO: 370 K/UL (ref 150–450)
PMV BLD AUTO: 9.5 FL (ref 9.4–12.3)
POTASSIUM SERPL-SCNC: 4.7 MMOL/L (ref 3.5–5.1)
PROT SERPL-MCNC: 7.3 G/DL (ref 6.3–8.2)
PROT UR STRIP-MCNC: ABNORMAL MG/DL
RBC # BLD AUTO: 5.8 M/UL (ref 4.23–5.6)
RBC #/AREA URNS HPF: ABNORMAL /HPF
SODIUM SERPL-SCNC: 140 MMOL/L (ref 136–145)
SP GR UR REFRACTOMETRY: 1.02 (ref 1–1.02)
UROBILINOGEN UR QL STRIP.AUTO: 1 EU/DL (ref 0.2–1)
WBC # BLD AUTO: 17.4 K/UL (ref 4.3–11.1)
WBC URNS QL MICRO: ABNORMAL /HPF

## 2024-08-17 PROCEDURE — 85025 COMPLETE CBC W/AUTO DIFF WBC: CPT

## 2024-08-17 PROCEDURE — 82550 ASSAY OF CK (CPK): CPT

## 2024-08-17 PROCEDURE — 2580000003 HC RX 258: Performed by: INTERNAL MEDICINE

## 2024-08-17 PROCEDURE — 2580000003 HC RX 258: Performed by: EMERGENCY MEDICINE

## 2024-08-17 PROCEDURE — 80053 COMPREHEN METABOLIC PANEL: CPT

## 2024-08-17 PROCEDURE — 99285 EMERGENCY DEPT VISIT HI MDM: CPT

## 2024-08-17 PROCEDURE — 81001 URINALYSIS AUTO W/SCOPE: CPT

## 2024-08-17 PROCEDURE — 51700 IRRIGATION OF BLADDER: CPT

## 2024-08-17 PROCEDURE — 80307 DRUG TEST PRSMV CHEM ANLYZR: CPT

## 2024-08-17 PROCEDURE — 1100000000 HC RM PRIVATE

## 2024-08-17 PROCEDURE — 82077 ASSAY SPEC XCP UR&BREATH IA: CPT

## 2024-08-17 PROCEDURE — 6370000000 HC RX 637 (ALT 250 FOR IP): Performed by: INTERNAL MEDICINE

## 2024-08-17 PROCEDURE — 2580000003 HC RX 258

## 2024-08-17 PROCEDURE — 93005 ELECTROCARDIOGRAM TRACING: CPT

## 2024-08-17 RX ORDER — ONDANSETRON 4 MG/1
4 TABLET, ORALLY DISINTEGRATING ORAL EVERY 8 HOURS PRN
Status: DISCONTINUED | OUTPATIENT
Start: 2024-08-17 | End: 2024-08-20 | Stop reason: HOSPADM

## 2024-08-17 RX ORDER — MAGNESIUM SULFATE IN WATER 40 MG/ML
2000 INJECTION, SOLUTION INTRAVENOUS PRN
Status: DISCONTINUED | OUTPATIENT
Start: 2024-08-17 | End: 2024-08-20 | Stop reason: HOSPADM

## 2024-08-17 RX ORDER — SODIUM CHLORIDE 9 MG/ML
INJECTION, SOLUTION INTRAVENOUS PRN
Status: DISCONTINUED | OUTPATIENT
Start: 2024-08-17 | End: 2024-08-20 | Stop reason: HOSPADM

## 2024-08-17 RX ORDER — ONDANSETRON 2 MG/ML
4 INJECTION INTRAMUSCULAR; INTRAVENOUS EVERY 6 HOURS PRN
Status: DISCONTINUED | OUTPATIENT
Start: 2024-08-17 | End: 2024-08-20 | Stop reason: HOSPADM

## 2024-08-17 RX ORDER — GABAPENTIN 300 MG/1
300 CAPSULE ORAL 3 TIMES DAILY
Status: DISCONTINUED | OUTPATIENT
Start: 2024-08-17 | End: 2024-08-20 | Stop reason: HOSPADM

## 2024-08-17 RX ORDER — POTASSIUM CHLORIDE 7.45 MG/ML
10 INJECTION INTRAVENOUS PRN
Status: DISCONTINUED | OUTPATIENT
Start: 2024-08-17 | End: 2024-08-20 | Stop reason: HOSPADM

## 2024-08-17 RX ORDER — POTASSIUM CHLORIDE 20 MEQ/1
40 TABLET, EXTENDED RELEASE ORAL PRN
Status: DISCONTINUED | OUTPATIENT
Start: 2024-08-17 | End: 2024-08-20 | Stop reason: HOSPADM

## 2024-08-17 RX ORDER — CYCLOBENZAPRINE HCL 10 MG
10 TABLET ORAL 3 TIMES DAILY PRN
Status: DISCONTINUED | OUTPATIENT
Start: 2024-08-17 | End: 2024-08-20 | Stop reason: HOSPADM

## 2024-08-17 RX ORDER — ACETAMINOPHEN 650 MG/1
650 SUPPOSITORY RECTAL EVERY 6 HOURS PRN
Status: DISCONTINUED | OUTPATIENT
Start: 2024-08-17 | End: 2024-08-20 | Stop reason: HOSPADM

## 2024-08-17 RX ORDER — FAMOTIDINE 20 MG/1
10 TABLET, FILM COATED ORAL DAILY PRN
Status: DISCONTINUED | OUTPATIENT
Start: 2024-08-17 | End: 2024-08-20 | Stop reason: HOSPADM

## 2024-08-17 RX ORDER — SODIUM CHLORIDE 0.9 % (FLUSH) 0.9 %
5-40 SYRINGE (ML) INJECTION EVERY 12 HOURS SCHEDULED
Status: DISCONTINUED | OUTPATIENT
Start: 2024-08-17 | End: 2024-08-20 | Stop reason: HOSPADM

## 2024-08-17 RX ORDER — SODIUM CHLORIDE, SODIUM LACTATE, POTASSIUM CHLORIDE, AND CALCIUM CHLORIDE .6; .31; .03; .02 G/100ML; G/100ML; G/100ML; G/100ML
1000 INJECTION, SOLUTION INTRAVENOUS
Status: COMPLETED | OUTPATIENT
Start: 2024-08-17 | End: 2024-08-17

## 2024-08-17 RX ORDER — BISACODYL 10 MG
10 SUPPOSITORY, RECTAL RECTAL DAILY PRN
Status: DISCONTINUED | OUTPATIENT
Start: 2024-08-17 | End: 2024-08-20 | Stop reason: HOSPADM

## 2024-08-17 RX ORDER — HEPARIN SODIUM 5000 [USP'U]/ML
5000 INJECTION, SOLUTION INTRAVENOUS; SUBCUTANEOUS 2 TIMES DAILY
Status: DISCONTINUED | OUTPATIENT
Start: 2024-08-18 | End: 2024-08-18

## 2024-08-17 RX ORDER — MAGNESIUM HYDROXIDE/ALUMINUM HYDROXICE/SIMETHICONE 120; 1200; 1200 MG/30ML; MG/30ML; MG/30ML
30 SUSPENSION ORAL EVERY 6 HOURS PRN
Status: DISCONTINUED | OUTPATIENT
Start: 2024-08-17 | End: 2024-08-20 | Stop reason: HOSPADM

## 2024-08-17 RX ORDER — SODIUM CHLORIDE 9 MG/ML
INJECTION, SOLUTION INTRAVENOUS CONTINUOUS
Status: DISCONTINUED | OUTPATIENT
Start: 2024-08-17 | End: 2024-08-19

## 2024-08-17 RX ORDER — ACETAMINOPHEN 325 MG/1
650 TABLET ORAL EVERY 6 HOURS PRN
Status: DISCONTINUED | OUTPATIENT
Start: 2024-08-17 | End: 2024-08-20 | Stop reason: HOSPADM

## 2024-08-17 RX ORDER — CALCIUM CARBONATE-CHOLECALCIFEROL TAB 250 MG-125 UNIT 250-125 MG-UNIT
2 TAB ORAL DAILY
Status: DISCONTINUED | OUTPATIENT
Start: 2024-08-18 | End: 2024-08-20 | Stop reason: HOSPADM

## 2024-08-17 RX ORDER — POLYETHYLENE GLYCOL 3350 17 G/17G
17 POWDER, FOR SOLUTION ORAL DAILY PRN
Status: DISCONTINUED | OUTPATIENT
Start: 2024-08-17 | End: 2024-08-20 | Stop reason: HOSPADM

## 2024-08-17 RX ORDER — SODIUM CHLORIDE, SODIUM LACTATE, POTASSIUM CHLORIDE, AND CALCIUM CHLORIDE .6; .31; .03; .02 G/100ML; G/100ML; G/100ML; G/100ML
1000 INJECTION, SOLUTION INTRAVENOUS ONCE
Status: COMPLETED | OUTPATIENT
Start: 2024-08-17 | End: 2024-08-17

## 2024-08-17 RX ORDER — SODIUM CHLORIDE 0.9 % (FLUSH) 0.9 %
5-40 SYRINGE (ML) INJECTION PRN
Status: DISCONTINUED | OUTPATIENT
Start: 2024-08-17 | End: 2024-08-20 | Stop reason: HOSPADM

## 2024-08-17 RX ADMIN — SODIUM CHLORIDE: 9 INJECTION, SOLUTION INTRAVENOUS at 22:24

## 2024-08-17 RX ADMIN — CYCLOBENZAPRINE 10 MG: 10 TABLET, FILM COATED ORAL at 22:31

## 2024-08-17 RX ADMIN — SODIUM CHLORIDE, POTASSIUM CHLORIDE, SODIUM LACTATE AND CALCIUM CHLORIDE 1000 ML: 600; 310; 30; 20 INJECTION, SOLUTION INTRAVENOUS at 20:24

## 2024-08-17 RX ADMIN — SODIUM CHLORIDE, POTASSIUM CHLORIDE, SODIUM LACTATE AND CALCIUM CHLORIDE 1000 ML: 600; 310; 30; 20 INJECTION, SOLUTION INTRAVENOUS at 18:24

## 2024-08-17 RX ADMIN — GABAPENTIN 300 MG: 300 CAPSULE ORAL at 22:24

## 2024-08-17 ASSESSMENT — PAIN SCALES - GENERAL: PAINLEVEL_OUTOF10: 7

## 2024-08-17 ASSESSMENT — PAIN - FUNCTIONAL ASSESSMENT: PAIN_FUNCTIONAL_ASSESSMENT: 0-10

## 2024-08-17 NOTE — ED PROVIDER NOTES
Resource Strain     Difficulty Paying Living Expenses: Not hard at all     Difficulty Paying Medical Expenses: No   Food Insecurity: No Food Insecurity (5/7/2024)    Received from [facility]    Food Insecurity     Worried about Running Out of Food in the Last Year: Never true     Ran Out of Food in the Last Year: Never true   Transportation Needs: No Transportation Needs (5/7/2024)    Received from [facility]    Transportation Needs     Lack of Transportation: No   Stress: No Stress Concern Present (5/7/2024)    Received from [facility]    Stress     Feeling of Stress : Not at all   Social Connections: Socially Integrated (5/7/2024)    Received from [facility]    Social Connections     Frequency of Communication with Friends and Family: More than three times a week     Frequency of Social Gatherings with Friends and Family: More than three times a week   Intimate Partner Violence: Not At Risk (5/7/2024)    Received from [facility]    Intimate Partner Violence     Fear of Current or Ex-Partner: No     Emotionally Abused: No     Physically Abused: No     Sexually Abused: No   Housing Stability: Not At Risk (5/7/2024)    Received from [facility]    Housing Stability     Was there a time when you did not have a steady place to sleep: No     Worried that the place you are staying is making you sick: No        Previous Medications    CALCIUM-VITAMIN D (OSCAL-500) 500-5 MG-MCG TABS PER TABLET    Take 1 tablet by mouth daily    CYCLOBENZAPRINE (FLEXERIL) 10 MG TABLET    Take 1 tablet by mouth 3 times daily as needed for Muscle spasms    GABAPENTIN (NEURONTIN) 300 MG CAPSULE    Take 1 capsule by mouth 3 times daily as needed (back/leg/nerve pain).    GABAPENTIN (NEURONTIN) 300 MG CAPSULE    Take 1 capsule by mouth 3 times daily for 120 days. Intended supply: 30 days    LISINOPRIL (PRINIVIL;ZESTRIL) 40 MG TABLET    Take

## 2024-08-17 NOTE — ED TRIAGE NOTES
Patient arrives via EMS after stopping [place] a [place] store to ask for help. Per EMS first responders reported he was alert and oriented but tachycardic. Patient reports he has been walking and is currently homeless, was trying to go to [facility] to try and connect with someone. Patient reports he feels over heated and is having difficulty walking, but has been having difficulty since his back surgery.  Patient reports feeling hopeless and had suicidal ideations. Patient reports constipation and not voiding today.

## 2024-08-17 NOTE — ED NOTES
Pt cathed with positive results, slight difficulty passing over prostate. Approximately 150mls obtained. Sent to lab .

## 2024-08-17 NOTE — H&P
Soft, nontender, nondistended.  Extremities: No cyanosis or clubbing.  No edema  Skin:     No rashes.  Normal coloration.   Warm and dry.    Neuro:  CN II-XII grossly intact.  Sensation intact.   Psych:  Normal mood and affect.      Orders Placed This Encounter   Medications    lactated ringers bolus 1,000 mL    lactated ringers bolus 1,000 mL       Prior to Admit Medications:  Current Outpatient Medications   Medication Instructions    calcium-vitamin D (OSCAL-500) 500-5 MG-MCG TABS per tablet 1 tablet, Oral, DAILY    cyclobenzaprine (FLEXERIL) 10 mg, Oral, 3 TIMES DAILY PRN    gabapentin (NEURONTIN) 300 mg, Oral, 3 TIMES DAILY PRN    gabapentin (NEURONTIN) 300 mg, Oral, 3 TIMES DAILY, Intended supply: 30 days    lisinopril (PRINIVIL;ZESTRIL) 40 mg, Oral, DAILY    meloxicam (MOBIC) 7.5 mg, Oral, DAILY       I have personally reviewed labs and tests:  Recent Results (from the past 24 hour(s))   CK    Collection Time: 08/17/24  6:13 PM   Result Value Ref Range    Total  (H) 21 - 215 U/L   Comprehensive Metabolic Panel    Collection Time: 08/17/24  6:13 PM   Result Value Ref Range    Sodium 140 136 - 145 mmol/L    Potassium 4.7 3.5 - 5.1 mmol/L    Chloride 99 98 - 107 mmol/L    CO2 22 20 - 28 mmol/L    Anion Gap 19 (H) 9 - 18 mmol/L    Glucose 150 (H) 70 - 99 mg/dL    BUN 27 (H) 6 - 23 MG/DL    Creatinine 1.73 (H) 0.80 - 1.30 MG/DL    Est, Glom Filt Rate 46 (L) >60 ml/min/1.73m2    Calcium 9.9 8.8 - 10.2 MG/DL    Total Bilirubin 0.7 0.0 - 1.2 MG/DL    ALT 24 12 - 65 U/L    AST 51 (H) 15 - 37 U/L    Alk Phosphatase 64 40 - 129 U/L    Total Protein 7.3 6.3 - 8.2 g/dL    Albumin 4.4 3.5 - 5.0 g/dL    Globulin 3.0 2.3 - 3.5 g/dL    Albumin/Globulin Ratio 1.5 1.0 - 1.9     CBC with Auto Differential    Collection Time: 08/17/24  6:13 PM   Result Value Ref Range    WBC 17.4 (H) 4.3 - 11.1 K/uL    RBC 5.80 (H) 4.23 - 5.6 M/uL    Hemoglobin 16.5 13.6 - 17.2 g/dL    Hematocrit 48.9 41.1 - 50.3 %    MCV 84.3 82.0 - 102.0

## 2024-08-18 PROBLEM — F22 DELUSIONS (HCC): Status: ACTIVE | Noted: 2024-08-18

## 2024-08-18 LAB
ANION GAP SERPL CALC-SCNC: 11 MMOL/L (ref 9–18)
BASOPHILS # BLD: 0.1 K/UL (ref 0–0.2)
BASOPHILS NFR BLD: 1 % (ref 0–2)
BUN SERPL-MCNC: 18 MG/DL (ref 6–23)
CALCIUM SERPL-MCNC: 8.7 MG/DL (ref 8.8–10.2)
CHLORIDE SERPL-SCNC: 105 MMOL/L (ref 98–107)
CK SERPL-CCNC: 616 U/L (ref 21–215)
CO2 SERPL-SCNC: 27 MMOL/L (ref 20–28)
CREAT SERPL-MCNC: 0.98 MG/DL (ref 0.8–1.3)
DIFFERENTIAL METHOD BLD: ABNORMAL
EKG ATRIAL RATE: 101 BPM
EKG DIAGNOSIS: NORMAL
EKG P AXIS: 69 DEGREES
EKG P-R INTERVAL: 185 MS
EKG Q-T INTERVAL: 363 MS
EKG QRS DURATION: 95 MS
EKG QTC CALCULATION (BAZETT): 471 MS
EKG R AXIS: 71 DEGREES
EKG T AXIS: 69 DEGREES
EKG VENTRICULAR RATE: 101 BPM
EOSINOPHIL # BLD: 0.2 K/UL (ref 0–0.8)
EOSINOPHIL NFR BLD: 2 % (ref 0.5–7.8)
ERYTHROCYTE [DISTWIDTH] IN BLOOD BY AUTOMATED COUNT: 14 % (ref 11.9–14.6)
GLUCOSE SERPL-MCNC: 104 MG/DL (ref 70–99)
HCT VFR BLD AUTO: 43.1 % (ref 41.1–50.3)
HGB BLD-MCNC: 14.2 G/DL (ref 13.6–17.2)
IMM GRANULOCYTES # BLD AUTO: 0.1 K/UL (ref 0–0.5)
IMM GRANULOCYTES NFR BLD AUTO: 0 % (ref 0–5)
LYMPHOCYTES # BLD: 2.3 K/UL (ref 0.5–4.6)
LYMPHOCYTES NFR BLD: 19 % (ref 13–44)
MAGNESIUM SERPL-MCNC: 2.1 MG/DL (ref 1.8–2.4)
MCH RBC QN AUTO: 28.2 PG (ref 26.1–32.9)
MCHC RBC AUTO-ENTMCNC: 32.9 G/DL (ref 31.4–35)
MCV RBC AUTO: 85.7 FL (ref 82–102)
MONOCYTES # BLD: 1.2 K/UL (ref 0.1–1.3)
MONOCYTES NFR BLD: 10 % (ref 4–12)
NEUTS SEG # BLD: 8.5 K/UL (ref 1.7–8.2)
NEUTS SEG NFR BLD: 69 % (ref 43–78)
NRBC # BLD: 0 K/UL (ref 0–0.2)
PLATELET # BLD AUTO: 316 K/UL (ref 150–450)
PMV BLD AUTO: 9.8 FL (ref 9.4–12.3)
POTASSIUM SERPL-SCNC: 4.4 MMOL/L (ref 3.5–5.1)
RBC # BLD AUTO: 5.03 M/UL (ref 4.23–5.6)
SODIUM SERPL-SCNC: 143 MMOL/L (ref 136–145)
WBC # BLD AUTO: 12.3 K/UL (ref 4.3–11.1)

## 2024-08-18 PROCEDURE — 85025 COMPLETE CBC W/AUTO DIFF WBC: CPT

## 2024-08-18 PROCEDURE — 36415 COLL VENOUS BLD VENIPUNCTURE: CPT

## 2024-08-18 PROCEDURE — 83735 ASSAY OF MAGNESIUM: CPT

## 2024-08-18 PROCEDURE — 82550 ASSAY OF CK (CPK): CPT

## 2024-08-18 PROCEDURE — 80048 BASIC METABOLIC PNL TOTAL CA: CPT

## 2024-08-18 PROCEDURE — 1100000000 HC RM PRIVATE

## 2024-08-18 PROCEDURE — 94760 N-INVAS EAR/PLS OXIMETRY 1: CPT

## 2024-08-18 PROCEDURE — 6360000002 HC RX W HCPCS: Performed by: STUDENT IN AN ORGANIZED HEALTH CARE EDUCATION/TRAINING PROGRAM

## 2024-08-18 PROCEDURE — 6370000000 HC RX 637 (ALT 250 FOR IP): Performed by: NURSE PRACTITIONER

## 2024-08-18 PROCEDURE — 2580000003 HC RX 258: Performed by: INTERNAL MEDICINE

## 2024-08-18 PROCEDURE — 93010 ELECTROCARDIOGRAM REPORT: CPT | Performed by: INTERNAL MEDICINE

## 2024-08-18 PROCEDURE — 6370000000 HC RX 637 (ALT 250 FOR IP): Performed by: INTERNAL MEDICINE

## 2024-08-18 RX ORDER — FOLIC ACID 1 MG/1
1 TABLET ORAL DAILY
Status: DISCONTINUED | OUTPATIENT
Start: 2024-08-18 | End: 2024-08-20 | Stop reason: HOSPADM

## 2024-08-18 RX ORDER — HYDRALAZINE HYDROCHLORIDE 20 MG/ML
5 INJECTION INTRAMUSCULAR; INTRAVENOUS EVERY 8 HOURS PRN
Status: DISCONTINUED | OUTPATIENT
Start: 2024-08-18 | End: 2024-08-20 | Stop reason: HOSPADM

## 2024-08-18 RX ORDER — OLANZAPINE 5 MG/1
5 TABLET, ORALLY DISINTEGRATING ORAL 2 TIMES DAILY PRN
Status: DISCONTINUED | OUTPATIENT
Start: 2024-08-18 | End: 2024-08-18

## 2024-08-18 RX ORDER — AMLODIPINE BESYLATE 5 MG/1
5 TABLET ORAL DAILY
Status: DISCONTINUED | OUTPATIENT
Start: 2024-08-18 | End: 2024-08-19

## 2024-08-18 RX ORDER — OLANZAPINE 5 MG/1
2.5 TABLET ORAL
Status: COMPLETED | OUTPATIENT
Start: 2024-08-18 | End: 2024-08-18

## 2024-08-18 RX ORDER — DIAZEPAM 5 MG/1
5 TABLET ORAL 2 TIMES DAILY
Status: DISCONTINUED | OUTPATIENT
Start: 2024-08-18 | End: 2024-08-20 | Stop reason: HOSPADM

## 2024-08-18 RX ORDER — OLANZAPINE 5 MG/1
2.5 TABLET ORAL 2 TIMES DAILY
Status: DISCONTINUED | OUTPATIENT
Start: 2024-08-18 | End: 2024-08-20 | Stop reason: HOSPADM

## 2024-08-18 RX ORDER — MULTIVITAMIN WITH IRON
1 TABLET ORAL DAILY
Status: DISCONTINUED | OUTPATIENT
Start: 2024-08-18 | End: 2024-08-20 | Stop reason: HOSPADM

## 2024-08-18 RX ORDER — HEPARIN SODIUM 5000 [USP'U]/ML
5000 INJECTION, SOLUTION INTRAVENOUS; SUBCUTANEOUS EVERY 8 HOURS SCHEDULED
Status: DISCONTINUED | OUTPATIENT
Start: 2024-08-18 | End: 2024-08-19

## 2024-08-18 RX ORDER — LANOLIN ALCOHOL/MO/W.PET/CERES
100 CREAM (GRAM) TOPICAL DAILY
Status: DISCONTINUED | OUTPATIENT
Start: 2024-08-18 | End: 2024-08-19

## 2024-08-18 RX ORDER — LANOLIN ALCOHOL/MO/W.PET/CERES
3 CREAM (GRAM) TOPICAL NIGHTLY PRN
Status: DISCONTINUED | OUTPATIENT
Start: 2024-08-18 | End: 2024-08-20 | Stop reason: HOSPADM

## 2024-08-18 RX ADMIN — OLANZAPINE 2.5 MG: 5 TABLET, FILM COATED ORAL at 16:24

## 2024-08-18 RX ADMIN — HEPARIN SODIUM 5000 UNITS: 5000 INJECTION INTRAVENOUS; SUBCUTANEOUS at 09:08

## 2024-08-18 RX ADMIN — HEPARIN SODIUM 5000 UNITS: 5000 INJECTION INTRAVENOUS; SUBCUTANEOUS at 14:00

## 2024-08-18 RX ADMIN — HEPARIN SODIUM 5000 UNITS: 5000 INJECTION INTRAVENOUS; SUBCUTANEOUS at 21:30

## 2024-08-18 RX ADMIN — OLANZAPINE 2.5 MG: 5 TABLET, FILM COATED ORAL at 21:28

## 2024-08-18 RX ADMIN — Medication 3 MG: at 01:14

## 2024-08-18 RX ADMIN — GABAPENTIN 300 MG: 300 CAPSULE ORAL at 14:00

## 2024-08-18 RX ADMIN — Medication 100 MG: at 16:24

## 2024-08-18 RX ADMIN — DIAZEPAM 5 MG: 5 TABLET ORAL at 21:30

## 2024-08-18 RX ADMIN — B-COMPLEX W/ C & FOLIC ACID TAB 1 TABLET: TAB at 16:24

## 2024-08-18 RX ADMIN — SODIUM CHLORIDE, PRESERVATIVE FREE 10 ML: 5 INJECTION INTRAVENOUS at 09:08

## 2024-08-18 RX ADMIN — CALCIUM CARBONATE-CHOLECALCIFEROL TAB 250 MG-125 UNIT 2 TABLET: 250-125 TAB at 09:09

## 2024-08-18 RX ADMIN — GABAPENTIN 300 MG: 300 CAPSULE ORAL at 21:30

## 2024-08-18 RX ADMIN — FOLIC ACID 1 MG: 1 TABLET ORAL at 16:24

## 2024-08-18 RX ADMIN — AMLODIPINE BESYLATE 5 MG: 5 TABLET ORAL at 10:30

## 2024-08-18 RX ADMIN — GABAPENTIN 300 MG: 300 CAPSULE ORAL at 09:09

## 2024-08-18 ASSESSMENT — PAIN SCALES - GENERAL: PAINLEVEL_OUTOF10: 2

## 2024-08-18 NOTE — ACP (ADVANCE CARE PLANNING)
Advance Care Planning   General Advance Care Planning (ACP) Conversation    Date of Conversation: 8/17/2024  Conducted with: Patient with Decision Making Capacity  Other persons present: None    Healthcare Decision Maker:    Primary Decision Maker: Mi Conde - Brother/Sister - [phone]      Length of Voluntary ACP Conversation in minutes:  <16 minutes (Non-Billable)    Jeri Haney

## 2024-08-18 NOTE — ED NOTES
Discussed with pt the possibility of harming self, pt states \"my body is too tired to hurt myself.\" Pt expresses worry about where he is going to sleep tonight with the rain. Explained to pt that he was being admitted and his diagnosis. Asks what harjinder;l happen when he gets better and has no where to go. Explained to pt that Case Mgt would give him resources before he was discharged, that he could get a shower and get food while he is here. Pt seemed to be more relaxed with that information.

## 2024-08-18 NOTE — PROGRESS NOTES
Hospitalist Progress Note   Admit Date:  2024  5:46 PM   Name:  Jesus Walters   Age:  54 y.o.  Sex:  male  :  1970   MRN:  [id number]   Room:  [facility]/    Presenting Complaint: Heat Exposure and Fatigue     Reason(s) for Admission: Non-traumatic rhabdomyolysis [M62.82]  Exertional rhabdomyolysis [M62.82]     Hospital Course:   Jesus Walters is a 54 y.o. male with medical history of htn, phillip on cpap, htn,hld,mvp,male hypogonadism,and chronic back pain sec to thoracic and lumbar surgeries in the past.  Pt is home less. Hasn't been on any mediation for atlast two weeks.  Wont answer questions except tosay he couldn't take the heat any more. Pt has multiple bruises on bilateral knee area and back appears to be pressure redness his face is also red from being in the sun.pt does tell me that he doesn't smoke or drink at this time. Used to be an alcoholic. No financial source to purchase any meds.  He [place] and [place] and they called EMS who brought him to the hospital. Pt currently eating a sandwich.  Fluid bolus given in ED significant abnormality is elevated cpk 960.pt also has arf based on numbers. Pt will be admitted for Rhabdo/ARF.pt denies any fevers,chills,cough or phlegm. Denies any nausea/vomiting or diarrhea.     Subjective & 24hr Events (24):   Patient anxious and fidgety bedside.  At times rambles with tangible thoughts about how he got here and his past medical history.  He gives me permission to speak with his Sister Mi.  He is unsure what happened yesterday.  I have explained he is dehydrated and has rhabdomyolysis.  He needs to stay for IV fluids at least another 24 hours, this possibly can buy me some time to look into his psychiatric concerns/history.  CK trending down.  Patient on IV fluids.  ACE inhibitor on hold.    I spoke at length with his Sister Mi.  She lives outside of [place] in [facility].  Their mom is in a memory care in

## 2024-08-18 NOTE — ED NOTES
TRANSFER - OUT REPORT:    Verbal report given to YI Clemente on Jesus Walters  being transferred to room 358 for routine progression of patient care       Report consisted of patient's Situation, Background, Assessment and   Recommendations(SBAR).     Information from the following report(s) ED SBAR was reviewed with the receiving nurse.    Lines:   Peripheral IV 08/17/24 Left Antecubital (Active)        Opportunity for questions and clarification was provided.      Patient transported with:  Registered Nurse

## 2024-08-19 ENCOUNTER — APPOINTMENT (OUTPATIENT)
Dept: CT IMAGING | Age: 54
DRG: 923 | End: 2024-08-19
Payer: COMMERCIAL

## 2024-08-19 PROBLEM — N17.9 AKI (ACUTE KIDNEY INJURY) (HCC): Status: ACTIVE | Noted: 2024-08-19

## 2024-08-19 PROBLEM — D72.829 LEUKOCYTOSIS: Status: ACTIVE | Noted: 2024-08-19

## 2024-08-19 LAB
ANION GAP SERPL CALC-SCNC: 15 MMOL/L (ref 9–18)
BASOPHILS # BLD: 0.1 K/UL (ref 0–0.2)
BASOPHILS NFR BLD: 1 % (ref 0–2)
BUN SERPL-MCNC: 9 MG/DL (ref 6–23)
CALCIUM SERPL-MCNC: 8.5 MG/DL (ref 8.8–10.2)
CHLORIDE SERPL-SCNC: 108 MMOL/L (ref 98–107)
CK SERPL-CCNC: 290 U/L (ref 21–215)
CO2 SERPL-SCNC: 22 MMOL/L (ref 20–28)
CREAT SERPL-MCNC: 0.78 MG/DL (ref 0.8–1.3)
DIFFERENTIAL METHOD BLD: NORMAL
EOSINOPHIL # BLD: 0.3 K/UL (ref 0–0.8)
EOSINOPHIL NFR BLD: 3 % (ref 0.5–7.8)
ERYTHROCYTE [DISTWIDTH] IN BLOOD BY AUTOMATED COUNT: 14.1 % (ref 11.9–14.6)
EST. AVERAGE GLUCOSE BLD GHB EST-MCNC: 101 MG/DL
FOLATE SERPL-MCNC: 24 NG/ML (ref 3.1–17.5)
GLUCOSE SERPL-MCNC: 99 MG/DL (ref 70–99)
HBA1C MFR BLD: 5.2 % (ref 0–5.6)
HCT VFR BLD AUTO: 44.9 % (ref 41.1–50.3)
HGB BLD-MCNC: 14.1 G/DL (ref 13.6–17.2)
IMM GRANULOCYTES # BLD AUTO: 0 K/UL (ref 0–0.5)
IMM GRANULOCYTES NFR BLD AUTO: 0 % (ref 0–5)
LYMPHOCYTES # BLD: 1.9 K/UL (ref 0.5–4.6)
LYMPHOCYTES NFR BLD: 19 % (ref 13–44)
MCH RBC QN AUTO: 28 PG (ref 26.1–32.9)
MCHC RBC AUTO-ENTMCNC: 31.4 G/DL (ref 31.4–35)
MCV RBC AUTO: 89.3 FL (ref 82–102)
MONOCYTES # BLD: 0.7 K/UL (ref 0.1–1.3)
MONOCYTES NFR BLD: 7 % (ref 4–12)
NEUTS SEG # BLD: 7.2 K/UL (ref 1.7–8.2)
NEUTS SEG NFR BLD: 71 % (ref 43–78)
NRBC # BLD: 0 K/UL (ref 0–0.2)
PLATELET # BLD AUTO: 286 K/UL (ref 150–450)
PMV BLD AUTO: 10.5 FL (ref 9.4–12.3)
POTASSIUM SERPL-SCNC: 3.8 MMOL/L (ref 3.5–5.1)
RBC # BLD AUTO: 5.03 M/UL (ref 4.23–5.6)
SARS-COV-2 RDRP RESP QL NAA+PROBE: NOT DETECTED
SODIUM SERPL-SCNC: 145 MMOL/L (ref 136–145)
SOURCE: NORMAL
T PALLIDUM AB SER QL IA: NONREACTIVE
TSH W FREE THYROID IF ABNORMAL: 2.79 UIU/ML (ref 0.27–4.2)
VIT B12 SERPL-MCNC: 867 PG/ML (ref 193–986)
WBC # BLD AUTO: 10.1 K/UL (ref 4.3–11.1)

## 2024-08-19 PROCEDURE — 83036 HEMOGLOBIN GLYCOSYLATED A1C: CPT

## 2024-08-19 PROCEDURE — 82607 VITAMIN B-12: CPT

## 2024-08-19 PROCEDURE — 84443 ASSAY THYROID STIM HORMONE: CPT

## 2024-08-19 PROCEDURE — 87635 SARS-COV-2 COVID-19 AMP PRB: CPT

## 2024-08-19 PROCEDURE — 1100000000 HC RM PRIVATE

## 2024-08-19 PROCEDURE — 2580000003 HC RX 258: Performed by: STUDENT IN AN ORGANIZED HEALTH CARE EDUCATION/TRAINING PROGRAM

## 2024-08-19 PROCEDURE — 85025 COMPLETE CBC W/AUTO DIFF WBC: CPT

## 2024-08-19 PROCEDURE — 6370000000 HC RX 637 (ALT 250 FOR IP): Performed by: NURSE PRACTITIONER

## 2024-08-19 PROCEDURE — 70450 CT HEAD/BRAIN W/O DYE: CPT

## 2024-08-19 PROCEDURE — 6360000002 HC RX W HCPCS: Performed by: STUDENT IN AN ORGANIZED HEALTH CARE EDUCATION/TRAINING PROGRAM

## 2024-08-19 PROCEDURE — 83825 ASSAY OF MERCURY: CPT

## 2024-08-19 PROCEDURE — 83655 ASSAY OF LEAD: CPT

## 2024-08-19 PROCEDURE — 2580000003 HC RX 258: Performed by: INTERNAL MEDICINE

## 2024-08-19 PROCEDURE — 6360000002 HC RX W HCPCS: Performed by: INTERNAL MEDICINE

## 2024-08-19 PROCEDURE — 36415 COLL VENOUS BLD VENIPUNCTURE: CPT

## 2024-08-19 PROCEDURE — 80048 BASIC METABOLIC PNL TOTAL CA: CPT

## 2024-08-19 PROCEDURE — 86780 TREPONEMA PALLIDUM: CPT

## 2024-08-19 PROCEDURE — 82746 ASSAY OF FOLIC ACID SERUM: CPT

## 2024-08-19 PROCEDURE — 82175 ASSAY OF ARSENIC: CPT

## 2024-08-19 PROCEDURE — 82550 ASSAY OF CK (CPK): CPT

## 2024-08-19 PROCEDURE — 6370000000 HC RX 637 (ALT 250 FOR IP): Performed by: INTERNAL MEDICINE

## 2024-08-19 RX ORDER — ENOXAPARIN SODIUM 100 MG/ML
40 INJECTION SUBCUTANEOUS EVERY 24 HOURS
Status: DISCONTINUED | OUTPATIENT
Start: 2024-08-19 | End: 2024-08-20 | Stop reason: HOSPADM

## 2024-08-19 RX ORDER — LISINOPRIL 20 MG/1
40 TABLET ORAL DAILY
Status: DISCONTINUED | OUTPATIENT
Start: 2024-08-20 | End: 2024-08-20 | Stop reason: HOSPADM

## 2024-08-19 RX ADMIN — GABAPENTIN 300 MG: 300 CAPSULE ORAL at 08:55

## 2024-08-19 RX ADMIN — DIAZEPAM 5 MG: 5 TABLET ORAL at 08:55

## 2024-08-19 RX ADMIN — B-COMPLEX W/ C & FOLIC ACID TAB 1 TABLET: TAB at 08:55

## 2024-08-19 RX ADMIN — SODIUM CHLORIDE 1 MG: 9 INJECTION INTRAMUSCULAR; INTRAVENOUS; SUBCUTANEOUS at 17:24

## 2024-08-19 RX ADMIN — ENOXAPARIN SODIUM 40 MG: 100 INJECTION SUBCUTANEOUS at 17:34

## 2024-08-19 RX ADMIN — AMLODIPINE BESYLATE 5 MG: 5 TABLET ORAL at 08:55

## 2024-08-19 RX ADMIN — THIAMINE HYDROCHLORIDE 500 MG: 100 INJECTION, SOLUTION INTRAMUSCULAR; INTRAVENOUS at 11:42

## 2024-08-19 RX ADMIN — GABAPENTIN 300 MG: 300 CAPSULE ORAL at 20:51

## 2024-08-19 RX ADMIN — CALCIUM CARBONATE-CHOLECALCIFEROL TAB 250 MG-125 UNIT 2 TABLET: 250-125 TAB at 08:55

## 2024-08-19 RX ADMIN — DIAZEPAM 5 MG: 5 TABLET ORAL at 20:51

## 2024-08-19 RX ADMIN — OLANZAPINE 2.5 MG: 5 TABLET, FILM COATED ORAL at 08:54

## 2024-08-19 RX ADMIN — THIAMINE HYDROCHLORIDE 500 MG: 100 INJECTION, SOLUTION INTRAMUSCULAR; INTRAVENOUS at 23:23

## 2024-08-19 RX ADMIN — HEPARIN SODIUM 5000 UNITS: 5000 INJECTION INTRAVENOUS; SUBCUTANEOUS at 09:05

## 2024-08-19 RX ADMIN — FOLIC ACID 1 MG: 1 TABLET ORAL at 08:55

## 2024-08-19 RX ADMIN — THIAMINE HYDROCHLORIDE 500 MG: 100 INJECTION, SOLUTION INTRAMUSCULAR; INTRAVENOUS at 17:34

## 2024-08-19 RX ADMIN — SODIUM CHLORIDE, PRESERVATIVE FREE 10 ML: 5 INJECTION INTRAVENOUS at 20:51

## 2024-08-19 RX ADMIN — Medication 100 MG: at 08:55

## 2024-08-19 RX ADMIN — GABAPENTIN 300 MG: 300 CAPSULE ORAL at 15:32

## 2024-08-19 RX ADMIN — SODIUM CHLORIDE, PRESERVATIVE FREE 1 MG: 5 INJECTION INTRAVENOUS at 23:16

## 2024-08-19 ASSESSMENT — PAIN SCALES - GENERAL: PAINLEVEL_OUTOF10: 0

## 2024-08-19 NOTE — PROGRESS NOTES
Hospitalist Progress Note   Admit Date:  2024  5:46 PM   Name:  Jesus Walters   Age:  54 y.o.  Sex:  male  :  1970   MRN:  [id number]   Room:  [facility]/    Presenting/Chief Complaint: Heat Exposure and Fatigue     Reason(s) for Admission: Non-traumatic rhabdomyolysis [M62.82]  Exertional rhabdomyolysis [M62.82]     Hospital Course:   Patient is a 53 y/o male with medical history of hypertension, TANVIR on cpap, hyperlipidemia, hypogonadism, anxiety/depression, ? Bipolar disorder, mitral valve prolapse, ETOH dependence, chronic back pain with numerous thoracic and lumbar surgeries, lumbar compression fx, homelessness who presented to ED after [place] [employer] with EMS notified for transport. Reportedly was in a rehab facility until Friday, left AMA, and [place]. On arrival, temperature was 99.8. Patient was mildly tachycardic to 112. BP stable at 144/90. No hypoxia. Labs notable for BUN 27 Cr 1.73  AST 51 WBC 17.4. Ethanol level < 11 UDS negative UA notable for ketones. Phencyclidine negative. EKG with sinus tachycardia. Hospitalist admission requested for exertional rhabdomyolysis with DANILO.     Notable recent follow-up visit 2024 s/p thoracolumnar fusion with Dr. Desir including MRI of lumbar and thoracic spine. Per surgeon, imaging is stable and this is expected post operative course.    Sister available by phone ([phone]). She is able to provide an extensive history.     Subjective & 24hr Events:   Patient is alert, oriented x 3 today. He denies any alcohol use in over 87 days. He denies any opioid use as well. He states mood was stable until last winter when he decided to wean off clonazepam independently. He reports continued issues post spinal surgery primarily with urinary voiding and pain and is considering legal action against surgeon. Hx of elevated PSA with reported extensive workup which was negative.    Reviewed tele

## 2024-08-19 NOTE — PROGRESS NOTES
Pt is showing signs of anxiety and paranoia.  The pt is asking for ativan.  Yola Sharma NP is aware.  Waiting on response...    Note was read by Gabi Sharma NP.  No response.      I sent an urgent note to Dr Wang. Waiting on response....    See new orders

## 2024-08-19 NOTE — PROGRESS NOTES
At [address] Yola Sharma, [facility] placed an order for a bedside  for the pt.  I spoke with Annelise and Concepción with the [facility].  Both are aware of the need.

## 2024-08-20 VITALS
HEIGHT: 71 IN | SYSTOLIC BLOOD PRESSURE: 141 MMHG | TEMPERATURE: 97.2 F | RESPIRATION RATE: 20 BRPM | DIASTOLIC BLOOD PRESSURE: 88 MMHG | WEIGHT: 160 LBS | BODY MASS INDEX: 22.4 KG/M2 | OXYGEN SATURATION: 99 % | HEART RATE: 81 BPM

## 2024-08-20 PROBLEM — N17.9 AKI (ACUTE KIDNEY INJURY) (HCC): Status: RESOLVED | Noted: 2024-08-19 | Resolved: 2024-08-20

## 2024-08-20 PROBLEM — M62.82 EXERTIONAL RHABDOMYOLYSIS: Status: RESOLVED | Noted: 2024-08-17 | Resolved: 2024-08-20

## 2024-08-20 LAB
ARSENIC BLD-MCNC: NORMAL UG/L
HISPANIC?: NORMAL
LEAD BLD-MCNC: NORMAL UG/DL
MERCURY BLD-MCNC: NORMAL UG/L
RACE: NORMAL
SPECIMEN SOURCE: NORMAL
TEST PURPOSE: NORMAL

## 2024-08-20 PROCEDURE — 2580000003 HC RX 258: Performed by: INTERNAL MEDICINE

## 2024-08-20 PROCEDURE — 6360000002 HC RX W HCPCS: Performed by: INTERNAL MEDICINE

## 2024-08-20 PROCEDURE — 6370000000 HC RX 637 (ALT 250 FOR IP): Performed by: NURSE PRACTITIONER

## 2024-08-20 PROCEDURE — 6370000000 HC RX 637 (ALT 250 FOR IP): Performed by: STUDENT IN AN ORGANIZED HEALTH CARE EDUCATION/TRAINING PROGRAM

## 2024-08-20 PROCEDURE — 6370000000 HC RX 637 (ALT 250 FOR IP): Performed by: INTERNAL MEDICINE

## 2024-08-20 RX ORDER — FOLIC ACID 1 MG/1
1 TABLET ORAL DAILY
Qty: 30 TABLET | Refills: 3
Start: 2024-08-21

## 2024-08-20 RX ORDER — DIAZEPAM 5 MG/1
5 TABLET ORAL 2 TIMES DAILY
Qty: 20 TABLET | Refills: 0
Start: 2024-08-20 | End: 2024-08-30

## 2024-08-20 RX ORDER — MULTIVITAMIN WITH IRON
1 TABLET ORAL DAILY
Qty: 30 TABLET | Refills: 3
Start: 2024-08-21

## 2024-08-20 RX ORDER — GABAPENTIN 300 MG/1
300 CAPSULE ORAL 3 TIMES DAILY
Qty: 90 CAPSULE | Refills: 0
Start: 2024-08-20 | End: 2024-09-19

## 2024-08-20 RX ORDER — LANOLIN ALCOHOL/MO/W.PET/CERES
500 CREAM (GRAM) TOPICAL DAILY
Qty: 15 TABLET | Refills: 0
Start: 2024-08-20 | End: 2024-08-23

## 2024-08-20 RX ORDER — LANOLIN ALCOHOL/MO/W.PET/CERES
100 CREAM (GRAM) TOPICAL DAILY
Qty: 30 TABLET | Refills: 0
Start: 2024-08-20 | End: 2024-08-20

## 2024-08-20 RX ORDER — OLANZAPINE 2.5 MG/1
2.5 TABLET, FILM COATED ORAL 2 TIMES DAILY
Qty: 4 TABLET | Refills: 0
Start: 2024-08-20 | End: 2024-08-22

## 2024-08-20 RX ADMIN — GABAPENTIN 300 MG: 300 CAPSULE ORAL at 08:26

## 2024-08-20 RX ADMIN — FOLIC ACID 1 MG: 1 TABLET ORAL at 08:27

## 2024-08-20 RX ADMIN — CALCIUM CARBONATE-CHOLECALCIFEROL TAB 250 MG-125 UNIT 2 TABLET: 250-125 TAB at 08:27

## 2024-08-20 RX ADMIN — SODIUM CHLORIDE, PRESERVATIVE FREE 1 MG: 5 INJECTION INTRAVENOUS at 06:14

## 2024-08-20 RX ADMIN — LISINOPRIL 40 MG: 20 TABLET ORAL at 08:26

## 2024-08-20 RX ADMIN — SODIUM CHLORIDE, PRESERVATIVE FREE 1 MG: 5 INJECTION INTRAVENOUS at 09:22

## 2024-08-20 RX ADMIN — SODIUM CHLORIDE, PRESERVATIVE FREE 10 ML: 5 INJECTION INTRAVENOUS at 08:28

## 2024-08-20 RX ADMIN — B-COMPLEX W/ C & FOLIC ACID TAB 1 TABLET: TAB at 08:26

## 2024-08-20 RX ADMIN — DIAZEPAM 5 MG: 5 TABLET ORAL at 08:27

## 2024-08-20 NOTE — PLAN OF CARE
Problem: Discharge Planning  Goal: Discharge to home or other facility with appropriate resources  8/20/2024 0935 by Suki Alarcon RN  Outcome: Progressing  8/20/2024 0403 by Caesar Cespedes RN  Outcome: Progressing     Problem: Pain  Goal: Verbalizes/displays adequate comfort level or baseline comfort level  8/20/2024 0935 by Suki Alarcon RN  Outcome: Progressing  8/20/2024 0403 by Caesar Cespedes RN  Outcome: Progressing     Problem: Safety - Adult  Goal: Free from fall injury  8/20/2024 0935 by Suki Alarcon RN  Outcome: Progressing  8/20/2024 0403 by Caesar Cespedes RN  Outcome: Progressing

## 2024-08-20 NOTE — CARE COORDINATION
CARY MELENDEZ spoke with Janki at [facility]. Pt with bed offer pending negative COVID-19 result.     CARY CM awaiting COVID-19 result prior to scheduling transport.     Update: COVID-19 test resulted. CARY MELENDEZ contacted [facility] to set-up transport.     CARY awaiting return call to determine date and time of transport.    CARY notified by [facility] transportation to be provided tomorrow for pt to [place].    Janki reports bed available tomorrow if unable to obtain police transport today.    Update: CARY MELENDEZ contacted [place] to provide update. CARY spoke with Barbi and discussed negative COVID-19 test and plan for law enforcement to transport tomorrow. Law enforcement to contact CARY MELENDEZ tomorrow morning with anticipated transportation time.    Accepting provider: Dr. Florian  Unit: Crisis Unit  Report Number: [phone]  
Patient is for discharge to [facility] via [facility] transportation.     SW notified MD, RN, pt's sister, and [place] of anticipated transportation time.     Transport packet placed at RN station.     LILIBETH completed.     Accepting provider: Dr. Florian  Unit: Crisis Unit  Report Number: [phone]       08/20/24 0849   Service Assessment   Patient's Healthcare Decision Maker is: Legal Next of Kin  (Sister)   Social/Functional History   Lives With Alone   Type of Home Apartment   Home Equipment None   ADL Assistance Independent   Ambulation Assistance Independent   Active  No   Patient's  Info Pt does not have a vehicle   Occupation Unemployed   Services At/After Discharge   Transition of Care Consult (CM Consult) Discharge Planning;Transportation Assistance   Services At/After Discharge Transport;In police custody;Inpatient rehab  ([facility])    Resource Information Provided? No   Mode of Transport at Discharge Other (see comment)  (Law Enforcement)   Hospital Transport Time of Discharge 0920   Confirm Follow Up Transport Other (see comment)  ([facility])   Condition of Participation: Discharge Planning   The Plan for Transition of Care is related to the following treatment goals: Patient to discharge to [facility] via law enforcement transportation.   The Patient and/or Patient Representative was provided with a Choice of Provider? Patient   The Patient and/Or Patient Representative agree with the Discharge Plan? Yes   Freedom of Choice list was provided with basic dialogue that supports the patient's individualized plan of care/goals, treatment preferences, and shares the quality data associated with the providers?  Yes     JOE Lockett, LBSW    [facility]    
Pt chart reviewed and pt to be placed on involuntary commitment papers per psych consult.     CARY MELENDEZ initiated involuntary commitment papers. MD signed IVC papers; papers notarized; scanned to media; placed in paper chart.     Pt discussed in AM IDR for continued stay. LOS 2 days. Pt admitted with exertional rhabdomyolysis. Per MD, pt medically stable pending psych placement.       Update: CARY MELENDEZ spoke with sister of pt regarding psychiatric evaluation to provide update. CARY educated sister on involuntary commitment papers and next steps. Sister reports preference for placement in [facility]; however reports open to placement outside of area if unable to get assistance locally.    Sister requested updates on psychiatric placement. Pt states wishes for sister to be decision maker.     CM team will continue to follow for potential discharge needs that may arise.     
other family members/significant others, and if so, who? Yes  (Sister)   Financial Resources Other (Comment)  ([employer])   Community Resources None   Social/Functional History   Lives With Alone   Type of Home Apartment   Home Equipment None   ADL Assistance Independent   Ambulation Assistance Independent   Active  No   Patient's  Info Pt does not have a vehicle   Occupation Unemployed   Discharge Planning   Type of Residence Apartment   Living Arrangements Alone   Current Services Prior To Admission None   Potential Assistance Needed N/A   DME Ordered? No   Potential Assistance Purchasing Medications No   Patient expects to be discharged to: Behavioral Health/Substance/Detox     JOE Maki

## 2024-08-20 NOTE — DISCHARGE SUMMARY
Hospitalist Discharge Summary   Admit Date:  2024  5:46 PM   DC Note date: 2024  Name:  Jesus Walters   Age:  54 y.o.  Sex:  male  :  1970   MRN:  [id number]   Room:  [facility]  PCP:  Ede Casillas Jr., MD    Presenting Complaint: Heat Exposure and Fatigue     Initial Admission Diagnosis: Non-traumatic rhabdomyolysis [M62.82]  Exertional rhabdomyolysis [M62.82]     Problem List for this Hospitalization (present on admission):    Principal Problem (Resolved):    Exertional rhabdomyolysis  Active Problems:    HTN (hypertension)    Chronic back pain    Anxiety and depression    Closed unstable burst fracture of first lumbar vertebra with routine healing    Delusions (HCC)    Leukocytosis  Resolved Problems:    DANILO (acute kidney injury) ([facility])      Hospital Course:  Patient is a 55 y/o male with medical history of hypertension, TANVIR on cpap, hyperlipidemia, hypogonadism, anxiety/depression, ? Bipolar disorder, mitral valve prolapse, ETOH dependence, chronic back pain with numerous thoracic and lumbar surgeries, lumbar compression fx, homelessness who presented to ED after [place] [employer] with EMS notified for transport. Reportedly was in a rehab facility until Friday, left AMA, and [place]. On arrival, temperature was 99.8. Patient was mildly tachycardic to 112. BP stable at 144/90. No hypoxia. Labs notable for BUN 27 Cr 1.73  AST 51 WBC 17.4. Ethanol level < 11 UDS negative UA notable for ketones. Phencyclidine negative. EKG with sinus tachycardia. Hospitalist admission requested for exertional rhabdomyolysis with DANILO. During hospitalization the following issues were addressed:    Exertional rhabdomyolysis - RESOLVED  Most likely secondary to heat exhaustion, severe dehydration  s/p fluid resuscitation  Renal function normalized, CK trending down appropriately. On day of discharge  (down from 943).      Hypertension - appropriate control

## 2024-08-21 LAB
ARSENIC 24H UR-MCNC: NORMAL UG/L (ref 0–9)
COLLECT DURATION TIME UR: NORMAL HR
CREAT UR-MCNC: 0.71 G/L (ref 0.3–3)
LEAD 24H UR-MCNC: NORMAL UG/L (ref 0–49)
MERCURY 24H UR-MCNC: NORMAL UG/L (ref 0–19)
SPECIMEN VOL ?TM UR: NORMAL ML

## 2024-12-05 ENCOUNTER — OFFICE VISIT (OUTPATIENT)
Age: 54
End: 2024-12-05
Payer: COMMERCIAL

## 2024-12-05 DIAGNOSIS — M43.25 FUSION OF SPINE OF THORACOLUMBAR REGION: Primary | ICD-10-CM

## 2024-12-05 PROCEDURE — 99213 OFFICE O/P EST LOW 20 MIN: CPT | Performed by: ORTHOPAEDIC SURGERY

## 2024-12-05 NOTE — PROGRESS NOTES
Name: Jesus Walters  YOB: 1970  Gender: male  MRN: [id number]  Age: 54 y.o.    Chief Complaint: Lumbar spine surgery follow up    History of Present Illness:      Jesus Walters  is here for 9 month follow up of his   thoracolumbar fusion  surgery which he had for a lumbar burst fracture.  I had seen him most recently in July after he had been admitted to [facility] with extensive workup and also had been in the [facility] emergency department with MRIs of the thoracic and lumbar spine.  Ultimately, he was found to have severely low testosterone levels and some residual lumbar stenosis in the area of his burst fracture.  At that time, I recommended physical therapy and formal pain management.  The patient has recently been evaluated in [place] again with multiple studies including a CT of the abdomen and pelvis.  Per that report, there was noted to be continued bony stenosis at the level of the burst fracture.      Medications:       Current Outpatient Medications:     gabapentin (NEURONTIN) 300 MG capsule, Take 1 capsule by mouth 3 times daily for 30 days., Disp: 90 capsule, Rfl: 0    OLANZapine (ZYPREXA) 2.5 MG tablet, Take 1 tablet by mouth in the morning and at bedtime for 4 doses, Disp: 4 tablet, Rfl: 0    folic acid (FOLVITE) 1 MG tablet, Take 1 tablet by mouth daily, Disp: 30 tablet, Rfl: 3    Multiple Vitamin (MULTIVITAMIN) TABS tablet, Take 1 tablet by mouth daily, Disp: 30 tablet, Rfl: 3    thiamine 100 MG tablet, Take 5 tablets by mouth daily for 3 days, Disp: 15 tablet, Rfl: 0    calcium-vitamin D (OSCAL-500) 500-5 MG-MCG TABS per tablet, Take 1 tablet by mouth daily, Disp: 30 tablet, Rfl: 1    lisinopril (PRINIVIL;ZESTRIL) 40 MG tablet, Take 1 tablet by mouth daily, Disp: , Rfl:     Allergies:  Allergies   Allergen Reactions    Latex Hives         Physical Exam:      He has lost quite a bit of weight and overall muscle mass since I had last seen

## 2024-12-17 ENCOUNTER — TELEPHONE (OUTPATIENT)
Dept: ORTHOPEDIC SURGERY | Age: 54
End: 2024-12-17

## 2024-12-17 DIAGNOSIS — M43.25 FUSION OF SPINE OF THORACOLUMBAR REGION: Primary | ICD-10-CM

## 2024-12-17 NOTE — TELEPHONE ENCOUNTER
I have contacted [facility]Dr Woodall's office for further explanation of why they will not accept the referral. Patient's brother has called and stated that they told him it was too complex for them to handle and he would need to be referred to [place]. I can not return the brother's phone call he is not listed on patient's HIPAA form. I will wait for a return call from Dr Woodall's office and then discuss with patient. This is the 3rd referral made for pain management.

## 2024-12-17 NOTE — TELEPHONE ENCOUNTER
They had asked for a referral to pain mgmt in [place]. They called and said he was too complex for them and that he needed to go to [place]. He is asking if you will refer him to pain mgmt in [place]. He is asking for a call when done and who it is sent to so he can call them.

## 2024-12-17 NOTE — TELEPHONE ENCOUNTER
Spoke with Dr Woodall's office and they simply stated they reviewed records and did not feel as if they had anything to offer him. I will try to send to [facility] [place] and see if they can be of any assistance to him
